# Patient Record
Sex: FEMALE | Race: WHITE | NOT HISPANIC OR LATINO | Employment: FULL TIME | URBAN - METROPOLITAN AREA
[De-identification: names, ages, dates, MRNs, and addresses within clinical notes are randomized per-mention and may not be internally consistent; named-entity substitution may affect disease eponyms.]

---

## 2018-03-06 LAB — PAP SMEAR: NORMAL

## 2018-12-10 ENCOUNTER — OFFICE VISIT (OUTPATIENT)
Dept: FAMILY MEDICINE | Facility: CLINIC | Age: 33
End: 2018-12-10
Payer: COMMERCIAL

## 2018-12-10 VITALS
DIASTOLIC BLOOD PRESSURE: 60 MMHG | SYSTOLIC BLOOD PRESSURE: 108 MMHG | TEMPERATURE: 99 F | WEIGHT: 173.38 LBS | HEIGHT: 64 IN | RESPIRATION RATE: 18 BRPM | HEART RATE: 64 BPM | BODY MASS INDEX: 29.6 KG/M2

## 2018-12-10 DIAGNOSIS — R49.0 HOARSENESS: ICD-10-CM

## 2018-12-10 DIAGNOSIS — F32.A DEPRESSION, UNSPECIFIED DEPRESSION TYPE: ICD-10-CM

## 2018-12-10 DIAGNOSIS — R06.83 SNORING: ICD-10-CM

## 2018-12-10 DIAGNOSIS — Z00.00 ANNUAL PHYSICAL EXAM: Primary | ICD-10-CM

## 2018-12-10 DIAGNOSIS — M94.0 COSTOCHONDRITIS: ICD-10-CM

## 2018-12-10 DIAGNOSIS — F41.9 ANXIETY: ICD-10-CM

## 2018-12-10 DIAGNOSIS — F41.0 PANIC ATTACK: ICD-10-CM

## 2018-12-10 LAB
CHOLEST SERPL-MCNC: 222 MG/DL
CHOLEST/HDLC SERPL: 3.4 {RATIO}
ESTIMATED AVG GLUCOSE: 97 MG/DL
HBA1C MFR BLD HPLC: 5 %
HDLC SERPL-MCNC: 66 MG/DL
HDLC SERPL: 29.7 %
LDLC SERPL CALC-MCNC: 133.4 MG/DL
NONHDLC SERPL-MCNC: 156 MG/DL
TRIGL SERPL-MCNC: 113 MG/DL
TSH SERPL DL<=0.005 MIU/L-ACNC: 2.04 UIU/ML

## 2018-12-10 PROCEDURE — 3008F BODY MASS INDEX DOCD: CPT | Mod: CPTII,S$GLB,, | Performed by: FAMILY MEDICINE

## 2018-12-10 PROCEDURE — 80061 LIPID PANEL: CPT

## 2018-12-10 PROCEDURE — 90472 IMMUNIZATION ADMIN EACH ADD: CPT | Mod: S$GLB,,, | Performed by: FAMILY MEDICINE

## 2018-12-10 PROCEDURE — 90686 IIV4 VACC NO PRSV 0.5 ML IM: CPT | Mod: S$GLB,,, | Performed by: FAMILY MEDICINE

## 2018-12-10 PROCEDURE — 99204 OFFICE O/P NEW MOD 45 MIN: CPT | Mod: 25,S$GLB,, | Performed by: FAMILY MEDICINE

## 2018-12-10 PROCEDURE — 83036 HEMOGLOBIN GLYCOSYLATED A1C: CPT

## 2018-12-10 PROCEDURE — 90715 TDAP VACCINE 7 YRS/> IM: CPT | Mod: S$GLB,,, | Performed by: FAMILY MEDICINE

## 2018-12-10 PROCEDURE — 90471 IMMUNIZATION ADMIN: CPT | Mod: S$GLB,,, | Performed by: FAMILY MEDICINE

## 2018-12-10 PROCEDURE — 84443 ASSAY THYROID STIM HORMONE: CPT

## 2018-12-10 PROCEDURE — 36415 COLL VENOUS BLD VENIPUNCTURE: CPT | Mod: S$GLB,,, | Performed by: FAMILY MEDICINE

## 2018-12-10 RX ORDER — DOXYCYCLINE HYCLATE 100 MG/1
100 TABLET, DELAYED RELEASE ORAL 2 TIMES DAILY
Qty: 20 TABLET | Refills: 0 | Status: SHIPPED | OUTPATIENT
Start: 2018-12-10 | End: 2019-01-22 | Stop reason: ALTCHOICE

## 2018-12-10 RX ORDER — ALPRAZOLAM 0.5 MG/1
0.5 TABLET ORAL DAILY PRN
Qty: 30 TABLET | Refills: 0 | Status: SHIPPED | OUTPATIENT
Start: 2018-12-10 | End: 2019-03-15 | Stop reason: SDUPTHER

## 2018-12-10 RX ORDER — PAROXETINE HYDROCHLORIDE 20 MG/1
20 TABLET, FILM COATED ORAL EVERY MORNING
Qty: 30 TABLET | Refills: 1 | Status: SHIPPED | OUTPATIENT
Start: 2018-12-10 | End: 2019-01-22

## 2018-12-10 RX ORDER — CETIRIZINE HYDROCHLORIDE 10 MG/1
10 TABLET ORAL DAILY
COMMUNITY

## 2018-12-10 RX ORDER — FLUTICASONE PROPIONATE 50 MCG
2 SPRAY, SUSPENSION (ML) NASAL DAILY
Qty: 16 G | Refills: 5 | Status: SHIPPED | OUTPATIENT
Start: 2018-12-10 | End: 2021-03-05

## 2018-12-10 NOTE — PROGRESS NOTES
Subjective:       Patient ID: Christel Alejo is a 33 y.o. female.    Chief Complaint: Establish Care and ER Follow up ([facility]: Chest pain under Lt arm, numbness; Told Swelling in cartliage or stress)    HPI   The patient is a 33-year-old who is here today to establish care and to follow-up from a recent ER visit.    Today we discussed the followin) chest pain. She was recently in the ER with left-sided chest pain. She was diagnosed with costochondritis.  She was given a prescription for Naprosyn but never filled it.  Her pain has improved significantly.  She currently just has a light pain that she rates a 1 on the pain scale.  Her pain is in her left upper chest.  Her pain feels like a tightness.  Her pain is worse with deep breathing.  When she went to the ER, she was having left arm numbness, left upper chest pain/tightness that felt like severe tightness and like a sharp bite sensation, shortness of breath feeling as if she could not breathe causing her to take shallow breaths all of which woke her up from a dead sleep.  In the ER, she had labs, an EKG and chest x-ray all of which were normal.  She really does not recall any injury or trauma that may have contributed to the symptoms although she had been working on [email or web address] getting boxes in and out of the attic  2) anxiety and depression.  She has had a lot of stressors recently which we discussed.  She is not myself.  She feels as if her bucket is full.  She is frequently crying.  She feels depressed.  She is not sleeping well.  She has gained 30 lb in the past year (partially because she have moved and is no longer going to the gym and partially because she is stress eating).  She feels anxious , worried and stressed.  She has had anxiousness for years and had learned to suppress those feelings but that has been much harder to do lately.  Previously, she was taking a rare Xanax for panic attacks but 1 prescription would last her a  year.  Given how things have been recently, she feels that she may need a daily medicine for her mood with p.r.n. Xanax   3)  Rash.  She has a rash that is present on her back and the back of her left leg.  This has been present for a while and she has a tendency to pick at it so some areas are worse than others  4) hoarseness.  She does get horse after talking for a long period of time.  Her paternal grandmother and father have problems with hoarseness and were told this was a genetic issue.  Her grandmother and father have to get shots in her vocal cords.  She wonders if she may have the same thing      Review of Systems   Constitutional: Positive for unexpected weight change. Negative for appetite change, chills, diaphoresis, fatigue and fever.   HENT: Positive for voice change. Negative for congestion, dental problem, ear pain, hearing loss, postnasal drip, rhinorrhea, sneezing, sore throat and trouble swallowing.    Eyes: Negative for photophobia, pain, discharge and visual disturbance.   Respiratory: Negative for cough, chest tightness, shortness of breath and wheezing.    Cardiovascular: Negative for chest pain, palpitations and leg swelling.   Gastrointestinal: Negative for abdominal distention, abdominal pain, blood in stool, constipation, diarrhea, nausea and vomiting.   Endocrine: Negative for cold intolerance, heat intolerance, polydipsia and polyuria.   Genitourinary: Negative for dysuria, flank pain, frequency, genital sores, hematuria, menstrual problem and vaginal discharge.   Musculoskeletal: Negative for arthralgias, joint swelling and myalgias.   Skin: Positive for rash.   Neurological: Negative for dizziness, syncope, light-headedness and headaches.   Hematological: Negative for adenopathy. Does not bruise/bleed easily.   Psychiatric/Behavioral: Positive for dysphoric mood and sleep disturbance. Negative for self-injury and suicidal ideas. The patient is nervous/anxious.        Objective:       Physical Exam   Constitutional: She is oriented to person, place, and time. She appears well-developed and well-nourished. No distress.   HENT:   Head: Normocephalic and atraumatic.   Right Ear: Hearing, tympanic membrane, external ear and ear canal normal.   Left Ear: Hearing, tympanic membrane, external ear and ear canal normal.   Nose: Nose normal.   Mouth/Throat: Oropharynx is clear and moist and mucous membranes are normal. No oral lesions. No oropharyngeal exudate, posterior oropharyngeal edema or posterior oropharyngeal erythema.   Eyes: Conjunctivae, EOM and lids are normal. Pupils are equal, round, and reactive to light. No scleral icterus.   Neck: Normal range of motion. Neck supple. Carotid bruit is not present. No thyroid mass and no thyromegaly present.   Cardiovascular: Normal rate, regular rhythm and normal heart sounds.  No extrasystoles are present. PMI is not displaced. Exam reveals no gallop.   No murmur heard.  Pulmonary/Chest: Effort normal and breath sounds normal. No accessory muscle usage. No respiratory distress.   Clear to auscultation bilaterally.   Abdominal: Soft. Normal appearance and bowel sounds are normal. She exhibits no abdominal bruit. There is no hepatosplenomegaly. There is no tenderness. There is no rebound.   Lymphadenopathy:        Head (right side): No submental and no submandibular adenopathy present.        Head (left side): No submental and no submandibular adenopathy present.        Right cervical: No superficial cervical, no deep cervical and no posterior cervical adenopathy present.       Left cervical: No superficial cervical, no deep cervical and no posterior cervical adenopathy present.        Right: No supraclavicular adenopathy present.        Left: No supraclavicular adenopathy present.   Neurological: She is alert and oriented to person, place, and time. She has normal strength. No cranial nerve deficit or sensory deficit.   Skin: Skin is warm, dry and intact.  "  Rash on back and posteriorly on left leg consistent of redness and occasional small pustules around follicles consistent with folliculitis.     Psychiatric: Her behavior is normal. Thought content normal. Her mood appears anxious. Her speech is rapid and/or pressured. Cognition and memory are normal. She exhibits a depressed mood.   Tearful.     Blood pressure 108/60, pulse 64, temperature 98.6 °F (37 °C), temperature source Oral, resp. rate 18, height 5' 4" (1.626 m), weight 78.7 kg (173 lb 6.4 oz).Body mass index is 29.76 kg/m².          A/P:  1) chest pain most consistent with costochondritis.  Improved.  If her symptoms do not resolve or if they worsen, she will let me know.  She does have Naprosyn prescription that she could fill or we could do a short round of prednisone  2) anxiety and depression.  New to me.  Uncontrolled.  We will start Paxil 20 mg once a day.  She was given a limited supply of Xanax for p.r.n. anxiety use.  She understands this is a benzodiazepine with the potential for addiction and tolerance.  The patient also understands that all benzos impair reflexes and cognition and so the patient should not drive, operate heavy machinery or make significant decisions while taking the benzodiazepine.  The patient should also not take the benzodiazepines with alcohol.  We will check labs including a TSH given her weight gain  3) folliculitis.  New to me.  We will treat her with a course of doxycycline.  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  We will refer her to ENT for further evaluation  5) health maintenance issues.  We will check fasting labs.  We will administer Tdap and flu vaccine    I will see her back in 6-8 weeks or sooner if needed          "

## 2018-12-13 ENCOUNTER — PATIENT MESSAGE (OUTPATIENT)
Dept: FAMILY MEDICINE | Facility: CLINIC | Age: 33
End: 2018-12-13

## 2019-01-09 ENCOUNTER — TELEPHONE (OUTPATIENT)
Dept: ADMINISTRATIVE | Facility: HOSPITAL | Age: 34
End: 2019-01-09

## 2019-01-22 ENCOUNTER — OFFICE VISIT (OUTPATIENT)
Dept: FAMILY MEDICINE | Facility: CLINIC | Age: 34
End: 2019-01-22
Payer: COMMERCIAL

## 2019-01-22 VITALS
HEIGHT: 64 IN | WEIGHT: 175.38 LBS | SYSTOLIC BLOOD PRESSURE: 110 MMHG | TEMPERATURE: 98 F | RESPIRATION RATE: 18 BRPM | DIASTOLIC BLOOD PRESSURE: 70 MMHG | HEART RATE: 80 BPM | BODY MASS INDEX: 29.94 KG/M2

## 2019-01-22 DIAGNOSIS — G47.00 INSOMNIA, UNSPECIFIED TYPE: ICD-10-CM

## 2019-01-22 DIAGNOSIS — R21 RASH: ICD-10-CM

## 2019-01-22 DIAGNOSIS — R53.83 FATIGUE, UNSPECIFIED TYPE: ICD-10-CM

## 2019-01-22 DIAGNOSIS — F41.9 ANXIETY: Primary | ICD-10-CM

## 2019-01-22 PROCEDURE — 99213 OFFICE O/P EST LOW 20 MIN: CPT | Mod: S$GLB,,, | Performed by: FAMILY MEDICINE

## 2019-01-22 PROCEDURE — 3008F BODY MASS INDEX DOCD: CPT | Mod: CPTII,S$GLB,, | Performed by: FAMILY MEDICINE

## 2019-01-22 PROCEDURE — 99213 PR OFFICE/OUTPT VISIT, EST, LEVL III, 20-29 MIN: ICD-10-PCS | Mod: S$GLB,,, | Performed by: FAMILY MEDICINE

## 2019-01-22 PROCEDURE — 3008F PR BODY MASS INDEX (BMI) DOCUMENTED: ICD-10-PCS | Mod: CPTII,S$GLB,, | Performed by: FAMILY MEDICINE

## 2019-01-22 RX ORDER — SERTRALINE HYDROCHLORIDE 25 MG/1
25 TABLET, FILM COATED ORAL DAILY
Qty: 30 TABLET | Refills: 1 | Status: SHIPPED | OUTPATIENT
Start: 2019-01-22 | End: 2019-03-15 | Stop reason: SDUPTHER

## 2019-01-22 RX ORDER — TRAZODONE HYDROCHLORIDE 50 MG/1
25 TABLET ORAL NIGHTLY
Qty: 15 TABLET | Refills: 1 | Status: SHIPPED | OUTPATIENT
Start: 2019-01-22 | End: 2019-03-15 | Stop reason: SDUPTHER

## 2019-01-22 NOTE — PROGRESS NOTES
Subjective:       Patient ID: Christel Alejo is a 33 y.o. female.    Chief Complaint: Anxiety (6 week medication follow up )    HPI   The patient is a 33-year-old who is here today to follow-up on her anxiety.  She was not able to tolerate the Paxil.  She tried to take the Paxil for 2 weeks before she stopped it.  With the Paxil, she felt tired, fatigued, unmotivated, unfocused, and very nauseated.  When she stop the Paxil, these symptoms resolved.  She is still having anxiety and wonders if there is anything else that she can take.  Since I have seen her last, she has used about 5 Xanax tablets    She is also having trouble sleeping at night.  She falls asleep easily at night but is up and down the rest of the night thinking about things.    Of note, she still has a persistent rash    Review of Systems   Constitutional: Positive for fatigue. Negative for appetite change, chills, diaphoresis, fever and unexpected weight change.   HENT: Positive for voice change. Negative for congestion, ear pain, postnasal drip, rhinorrhea, sinus pressure, sneezing, sore throat and trouble swallowing.    Eyes: Negative for pain, discharge and visual disturbance.   Respiratory: Negative for cough, chest tightness, shortness of breath and wheezing.    Cardiovascular: Negative for chest pain, palpitations and leg swelling.   Gastrointestinal: Negative for abdominal distention, abdominal pain, blood in stool, constipation, diarrhea, nausea and vomiting.   Skin: Positive for rash.   Psychiatric/Behavioral: Positive for sleep disturbance. Negative for dysphoric mood. The patient is nervous/anxious.        Objective:      Physical Exam   Constitutional: She appears well-developed and well-nourished.   Psychiatric: She has a normal mood and affect. Her speech is normal and behavior is normal. Judgment and thought content normal. Cognition and memory are normal.     Blood pressure 110/70, pulse 80, temperature 98.2 °F (36.8 °C),  "temperature source Oral, resp. rate 18, height 5' 4" (1.626 m), weight 79.6 kg (175 lb 6.4 oz).Body mass index is 30.11 kg/m².            A/P:  1) anxiety.  Persistent.  We are going to try Zoloft.  Given her sensitivity to medicine, we are going to start was Zoloft 25 mg once a day for 1 or 2 weeks and then increase to 50 mg thereafter.  If she develops any new or worsening symptoms, she will let me know.  She will continue use her Xanax sparingly.  I will see her back in 6 weeks or sooner if needed    2) insomnia.  Persistent.  We are going to try trazodone as 25- 50 mg at night  3) rash.  Persistent.  We will refer her to Dermatology        Total visit time was 20 min  Greater than 50% of this time was spent counseling the patient or coordinating their care for her anxiety  "

## 2019-02-15 ENCOUNTER — OFFICE VISIT (OUTPATIENT)
Dept: OTOLARYNGOLOGY | Facility: CLINIC | Age: 34
End: 2019-02-15
Payer: COMMERCIAL

## 2019-02-15 ENCOUNTER — HOSPITAL ENCOUNTER (OUTPATIENT)
Dept: RADIOLOGY | Facility: HOSPITAL | Age: 34
Discharge: HOME | End: 2019-02-15
Attending: NURSE PRACTITIONER
Payer: COMMERCIAL

## 2019-02-15 ENCOUNTER — INITIAL CONSULT (OUTPATIENT)
Dept: DERMATOLOGY | Facility: CLINIC | Age: 34
End: 2019-02-15
Payer: COMMERCIAL

## 2019-02-15 ENCOUNTER — TELEPHONE (OUTPATIENT)
Dept: OTOLARYNGOLOGY | Facility: CLINIC | Age: 34
End: 2019-02-15

## 2019-02-15 VITALS
SYSTOLIC BLOOD PRESSURE: 110 MMHG | BODY MASS INDEX: 30.41 KG/M2 | DIASTOLIC BLOOD PRESSURE: 76 MMHG | WEIGHT: 178.13 LBS | HEIGHT: 64 IN

## 2019-02-15 DIAGNOSIS — Z12.83 SCREENING EXAM FOR SKIN CANCER: ICD-10-CM

## 2019-02-15 DIAGNOSIS — L98.9 DISEASE OF SKIN AND SUBCUTANEOUS TISSUE: ICD-10-CM

## 2019-02-15 DIAGNOSIS — R07.0 THROAT DISCOMFORT: ICD-10-CM

## 2019-02-15 DIAGNOSIS — D22.9 MULTIPLE BENIGN NEVI: ICD-10-CM

## 2019-02-15 DIAGNOSIS — R05.3 CHRONIC COUGH: ICD-10-CM

## 2019-02-15 DIAGNOSIS — R09.89 CHRONIC THROAT CLEARING: ICD-10-CM

## 2019-02-15 DIAGNOSIS — J32.0 LEFT MAXILLARY SINUSITIS: ICD-10-CM

## 2019-02-15 DIAGNOSIS — L70.0 ACNE VULGARIS: Primary | ICD-10-CM

## 2019-02-15 DIAGNOSIS — L73.9 FOLLICULITIS: ICD-10-CM

## 2019-02-15 DIAGNOSIS — R49.0 DYSPHONIA: ICD-10-CM

## 2019-02-15 DIAGNOSIS — J32.0 LEFT MAXILLARY SINUSITIS: Primary | ICD-10-CM

## 2019-02-15 DIAGNOSIS — K21.9 LPRD (LARYNGOPHARYNGEAL REFLUX DISEASE): ICD-10-CM

## 2019-02-15 PROCEDURE — 99999 PR PBB SHADOW E&M-EST. PATIENT-LVL IV: CPT | Mod: PBBFAC,,, | Performed by: NURSE PRACTITIONER

## 2019-02-15 PROCEDURE — 99999 PR PBB SHADOW E&M-EST. PATIENT-LVL III: ICD-10-PCS | Mod: PBBFAC,,, | Performed by: DERMATOLOGY

## 2019-02-15 PROCEDURE — 70220 X-RAY EXAM OF SINUSES: CPT | Mod: 26,,, | Performed by: RADIOLOGY

## 2019-02-15 PROCEDURE — 31575 DIAGNOSTIC LARYNGOSCOPY: CPT | Mod: S$GLB,,, | Performed by: NURSE PRACTITIONER

## 2019-02-15 PROCEDURE — 99999 PR PBB SHADOW E&M-EST. PATIENT-LVL III: CPT | Mod: PBBFAC,,, | Performed by: DERMATOLOGY

## 2019-02-15 PROCEDURE — 31575 PR LARYNGOSCOPY, FLEXIBLE; DIAGNOSTIC: ICD-10-PCS | Mod: S$GLB,,, | Performed by: NURSE PRACTITIONER

## 2019-02-15 PROCEDURE — 99203 PR OFFICE/OUTPT VISIT, NEW, LEVL III, 30-44 MIN: ICD-10-PCS | Mod: S$GLB,,, | Performed by: DERMATOLOGY

## 2019-02-15 PROCEDURE — 99203 OFFICE O/P NEW LOW 30 MIN: CPT | Mod: S$GLB,,, | Performed by: DERMATOLOGY

## 2019-02-15 PROCEDURE — 99243 OFF/OP CNSLTJ NEW/EST LOW 30: CPT | Mod: 25,S$GLB,, | Performed by: NURSE PRACTITIONER

## 2019-02-15 PROCEDURE — 70220 X-RAY EXAM OF SINUSES: CPT | Mod: TC,FY,PO

## 2019-02-15 PROCEDURE — 70220 XR SINUSES MIN 3 VIEWS: ICD-10-PCS | Mod: 26,,, | Performed by: RADIOLOGY

## 2019-02-15 PROCEDURE — 99243 PR OFFICE CONSULTATION,LEVEL III: ICD-10-PCS | Mod: 25,S$GLB,, | Performed by: NURSE PRACTITIONER

## 2019-02-15 PROCEDURE — 99999 PR PBB SHADOW E&M-EST. PATIENT-LVL IV: ICD-10-PCS | Mod: PBBFAC,,, | Performed by: NURSE PRACTITIONER

## 2019-02-15 RX ORDER — OMEPRAZOLE 40 MG/1
40 CAPSULE, DELAYED RELEASE ORAL
Qty: 30 CAPSULE | Refills: 11 | Status: SHIPPED | OUTPATIENT
Start: 2019-02-15 | End: 2021-03-05

## 2019-02-15 RX ORDER — CLINDAMYCIN PHOSPHATE AND BENZOYL PEROXIDE 10; 50 MG/G; MG/G
GEL TOPICAL
Qty: 45 G | Refills: 3 | Status: SHIPPED | OUTPATIENT
Start: 2019-02-15 | End: 2021-03-05

## 2019-02-15 RX ORDER — TRETINOIN 0.25 MG/G
CREAM TOPICAL NIGHTLY
Qty: 20 G | Refills: 3 | Status: SHIPPED | OUTPATIENT
Start: 2019-02-15 | End: 2021-03-05

## 2019-02-15 NOTE — LETTER
February 15, 2019      Bessy Guerrero MD  [address] [address] [address]           [facility]  [address] [facility] [address] [place] [address]  Phone: [phone]  Fax: [phone]          Patient: Christel Alejo   MR Number: [id number]   YOB: 1985   Date of Visit: 2/15/2019       Dear Dr. Bessy Guerrero:    Thank you for referring Christel Alejo to me for evaluation. Attached you will find relevant portions of my assessment and plan of care.    If you have questions, please do not hesitate to call me. I look forward to following Christel Alejo along with you.    Sincerely,    Elena Tapia MD    Enclosure  CC:  No Recipients    If you would like to receive this communication electronically, please contact [email or web address] or [phone] to request more information on [employer] access.    For providers and/or their staff who would like to refer a patient to [facility], please contact us through our one-stop-shop provider referral line, [facility], at [phone].    If you feel you have received this communication in error or would no longer like to receive these types of communications, please e-mail [email or web address]

## 2019-02-15 NOTE — TELEPHONE ENCOUNTER
----- Message from Bailey Benítez NP sent at 2/15/2019 12:44 PM CST -----  Sinuses are all open and clear. Please return to your dentist for left maxillary tooth pain as it is not sinus related.

## 2019-02-15 NOTE — PROGRESS NOTES
Subjective:       Patient ID:  Christel Alejo is a 33 y.o. female who presents for   Chief Complaint   Patient presents with    Rash     Christel Alejo a 32 yo female presents today for initial skin check with concerns of recurrent rash on face and chest for a duration of 6month with redness and inflammation. Denies pruritis. Worse with sun exposure- described as bumps and hives. States rash is very transient. Worse with stress. Denies occurring in hot showers. Takes zyrtec 10 mg daily.     Denies hx of skin cancer  Denies fx hx of melanoma    Past Medical History:  No date: Allergy  No date: Anxiety  No date: Depression  No date: History of cervical dysplasia      Comment:  s/p conization followers with Dr Vargas          Review of Systems   Skin: Positive for rash, dry skin and activity-related sunscreen use. Negative for itching.        Objective:    Physical Exam   Constitutional: She appears well-developed and well-nourished. No distress.   Neurological: She is alert and oriented to person, place, and time. She is not disoriented.   Psychiatric: She has a normal mood and affect.   Skin:   Areas Examined (abnormalities noted in diagram):   Scalp / Hair Palpated and Inspected  Head / Face Inspection Performed  Neck Inspection Performed  Chest / Axilla Inspection Performed  Abdomen Inspection Performed  Genitals / Buttocks / Groin Inspection Performed  Back Inspection Performed  RUE Inspected  LUE Inspection Performed  RLE Inspected  LLE Inspection Performed  Nails and Digits Inspection Performed                               Diagram Legend     Erythematous scaling macule/papule c/w actinic keratosis       Vascular papule c/w angioma      Pigmented verrucoid papule/plaque c/w seborrheic keratosis      Yellow umbilicated papule c/w sebaceous hyperplasia      Irregularly shaped tan macule c/w lentigo     1-2 mm smooth white papules consistent with Milia      Movable subcutaneous cyst with punctum c/w  epidermal inclusion cyst      Subcutaneous movable cyst c/w pilar cyst      Firm pink to brown papule c/w dermatofibroma      Pedunculated fleshy papule(s) c/w skin tag(s)      Evenly pigmented macule c/w junctional nevus     Mildly variegated pigmented, slightly irregular-bordered macule c/w mildly atypical nevus      Flesh colored to evenly pigmented papule c/w intradermal nevus       Pink pearly papule/plaque c/w basal cell carcinoma      Erythematous hyperkeratotic cursted plaque c/w SCC      Surgical scar with no sign of skin cancer recurrence      Open and closed comedones      Inflammatory papules and pustules      Verrucoid papule consistent consistent with wart     Erythematous eczematous patches and plaques     Dystrophic onycholytic nail with subungual debris c/w onychomycosis     Umbilicated papule    Erythematous-base heme-crusted tan verrucoid plaque consistent with inflamed seborrheic keratosis     Erythematous Silvery Scaling Plaque c/w Psoriasis     See annotation      Assessment / Plan:        Acne vulgaris  -     tretinoin (RETIN-A) 0.025 % cream; Apply topically every evening.  Dispense: 20 g; Refill: 3  -     clindamycin-benzoyl peroxide gel; AAA face qam to bid  Dispense: 45 g; Refill: 3    - Initiate topical tretinoin 0.025% cream, to be applied to face each night as tolerated. Side effects of redness, dryness, and irritation reviewed.  - Initiate topical clindamycin-BP gel to legs and face  - Use gentle cleanser    Folliculitis  -     clindamycin-benzoyl peroxide gel; AAA face qam to bid  Dispense: 45 g; Refill: 3    Clindamycin-BP BID to legs  Provided reassurance that this is a benign condition that may wax and wane and can be aggravated by friction and heat.     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 No lesions suspicious for malignancy noted.  Reassurance provided.  Instructed patient to observe lesion(s) for changes and follow up in clinic if changes are noted. Discussed ABCDE's of moles and brochure provided.    Screening exam for skin cancer  Total body skin examination performed today including at least 12 points as noted in physical examination. No lesions suspicious for malignancy noted.             No Follow-up on file.

## 2019-02-15 NOTE — PROGRESS NOTES
Subjective:       Patient ID: Christel Alejo is a 33 y.o. female.    Chief Complaint: Other (hoarness and snoring constant clearing of throat per pt)    HPI   Patient is new to ENT, referred by Dr. Guerrero for consultation for hoarseness. Patient reports her chief concern is constant throat clearing, which she attributes to nasal allergies. 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 She also has a chronic dry cough that worsens when she lays down at night.     IMAGING:  Chest x-ray two months ago negative. No sinus imaging.   EGD/GI notes: none  ALLERGY notes: none  ANTIBIOTICS: doxycycline 2 months ago    Review of Systems   Constitutional: Negative.    HENT: Positive for dental problem, postnasal drip, sneezing and voice change. Negative for congestion, facial swelling, rhinorrhea, sore throat and trouble swallowing.         Frequent throat clearing  Chronic sensation of thick or too much mucus in the back of her throat   Eyes: Negative.    Respiratory: Positive for cough (dry). Negative for choking.    Cardiovascular: Negative.    Gastrointestinal: Negative.    Musculoskeletal: Negative.    Skin: Negative.    Neurological: Negative.    Hematological: Negative.    Psychiatric/Behavioral: Negative.        Objective:      Physical Exam   Constitutional: She is oriented to person, place, and time. Vital signs are normal. She appears well-developed and well-nourished. She is cooperative. She does not appear ill. No distress.   HENT:   Head: Normocephalic and atraumatic.   Right Ear: Hearing, tympanic membrane, external ear and ear canal normal. Tympanic membrane is not erythematous. No middle ear effusion.   Left Ear: Hearing, tympanic membrane, external ear and ear canal normal. Tympanic membrane is not erythematous.  No middle ear effusion.   Nose: Nose normal. No mucosal edema or rhinorrhea. Right sinus exhibits no maxillary sinus tenderness and no  frontal sinus tenderness. Left sinus exhibits no maxillary sinus tenderness and no frontal sinus tenderness.   Mouth/Throat: Uvula is midline, oropharynx is clear and moist and mucous membranes are normal. Mucous membranes are not pale, not dry and not cyanotic. No oral lesions. No oropharyngeal exudate, posterior oropharyngeal edema or posterior oropharyngeal erythema.   Eyes: Conjunctivae, EOM and lids are normal. Pupils are equal, round, and reactive to light. Right eye exhibits no discharge. Left eye exhibits no discharge. No scleral icterus.   Neck: Trachea normal and normal range of motion. Neck supple. No tracheal deviation present. No thyroid mass and no thyromegaly present.   Cardiovascular: Normal rate.   Pulmonary/Chest: Effort normal. No stridor. No respiratory distress. She has no wheezes.   Musculoskeletal: Normal range of motion.   Lymphadenopathy:        Head (right side): No submental, no submandibular, no tonsillar, no preauricular and no posterior auricular adenopathy present.        Head (left side): No submental, no submandibular, no tonsillar, no preauricular and no posterior auricular adenopathy present.     She has no cervical adenopathy.        Right cervical: No superficial cervical and no posterior cervical adenopathy present.       Left cervical: No superficial cervical and no posterior cervical adenopathy present.   Neurological: She is alert and oriented to person, place, and time. She has normal strength. Coordination and gait normal.   Skin: Skin is warm, dry and intact. No lesion and no rash noted. She is not diaphoretic. No cyanosis. No pallor.   Psychiatric: She has a normal mood and affect. Her speech is normal and behavior is normal. Judgment and thought content normal. Cognition and memory are normal.   Nursing note and vitals reviewed.      Procedure: Flexible laryngoscopy    In order to fully examine the upper aerodigestive tract, including the larynx, in a patient with a  hyperactive gag reflex, and suboptimal visualization with indirect mirror exam,  flexible endoscopy is required.   After explaining the procedure and obtaining verbal consent, a timeout was performed with the patient's participation according to the universal protocol. Both nasal cavities were anesthetized with 4% Xylocaine spray mixed with Alexis-Synephrine. The flexible laryngoscope  was inserted into the nasal cavity and advanced to visualize the nasal cavity, nasopharynx, the posterior oropharynx, hypopharynx, and the endolarynx with the  findings noted. The scope was removed and the procedure terminated. The patient tolerated this procedure well without apparent complication.     OVERALL FINDINGS  Nasopharynx - the torus is clear. There are no lesions of the posterior wall.   Oropharynx - no lesions of the tongue base. There is no obvious fullness or asymmetry.  Hypopharynx - there are no lesions of the pyriform sinuses or postcricoid region   Larynx - there are no lesions of the supraglottic or glottic larynx.  Vocal fold mobility is normal.     SPECIFIC FINDINGS  Adenoid tissue - normal   Nasopharynx & eustachian tube orifices - normal   Posterior pharyngeal wall - normal   Base of tongue - normal   Epiglottis - normal   Valleculae - normal   Pyriform sinuses - normal   False vocal cords - normal   True vocal cords - normal  Arytenoids - normal   Interarytenoid space - erythema, edema   Larynx    Larynx    Assessment:     LPRD, manifested as chronic throat clearing, chronic dry cough, recurrent dysphonia, recurrent throat irritation      Plan:     Sinus imaging today to rule out sinus contribution    Advised/Cautioned: The results of today's ENT exam and flexible endoscopy were detailed to the patient and her questions were answered. Patient education centered around GERD, known exacerbants and contemporary treatment options. Laryngoscope photos were given to the patient. Handouts given on LPRD and GERD were  given to the patient. After review of these, patient elected to be placed on PPI 40 mg QAM on an empty stomach for the next 6-8 weeks, and H2-blocker QHS. I encouraged the patient once she has completed the evening meal to not snack or consume any other food products or caffeinated beverages for at least  minutes before retiring. Finally, I encouraged the patient to sleep about 30 degrees above horizontal, and this can be facilitated by using 2-3 pillows or a wedge foam product. If the patient is not demonstrably improved in 6-8 weeks, consultation with gastroenterology may be indicated to rule out intrinsic disease in the lower esophagus, stomach, or proximal duodenum.

## 2019-02-15 NOTE — PATIENT INSTRUCTIONS
Top Concerns for Chronic Cough:     1. Nasal allergies -- Typical constellation of symptoms seen with nasal allergies: itchy, red, watery eyes; itchy, red, watery nose; excessive sneezing; excessive stuffiness. If this one best describes your current state, then discuss with your primary care provider whether you should see an allergist or take daily allergy medications.     2. Sinus Infection -- Typical constellation of symptoms seen with acute bacterial sinus infection are:  Green-gold, foul-smelling, foul-tasting mucus from nose and throat, inability to breathe through nose, inability to smell or taste well, facial pain and swelling, dental pain, headaches around eyes, sore throat and productive cough. If this one best describes your current state, then let's get sinus imaging to rule out infection.     3.  Silent reflux -- Typical constellation of symptoms seen with silent reflux: post-nasal drip sensation with absence of significant runny nose or nasal congestion, sensation of thick or too much mucus in the back of throat, raspy voice, frequent throat clearing, lump in the back of throat, frequent sore throats. If this one best describes your current state, discuss with your primary care provider whether you should see a gastroenterologist or take daily reflux medications. Your GI doctor may want to do an Upper GI or obtain a barium swallow or pH monitoring test.     4. Asthma/Pulmonary (Lung) issue -- Typical constellation of symptoms: wheezing, shortness of breath. Discuss with your primary care provider whether you should see a pulmonologist (lung specialist) and have Pulmonary Function Testing (PFTs) done.       How Acid Reflux Affects Your Throat    Do you have to clear your throat or cough often? Are you hoarse? Do you have trouble swallowing? If you have these or other throat symptoms, you may have acid reflux. This occurs when stomach acid flows back up and irritates your throat.  Why you have throat  "symptoms  There are muscles (esophageal sphincters) at both ends of the tube that carries food to your stomach (the esophagus). These muscles relax to let food pass. Then they tighten to keep stomach acid down. When the lower esophageal sphincter (LES) doesnt tighten enough, acid can flow back (reflux) from your stomach into your esophagus. This may cause heartburn. In some cases the upper esophageal sphincter (UES) also doesnt work well. Then acid can travel higher and enter your throat (pharynx). In many cases, this causes throat symptoms.  Common throat symptoms  · Need to clear your throat often  · Feeling like youre choking  · Long-term (chronic) cough  · Hoarseness  · Trouble swallowing  · Feel like you have a lump in your throat  · Sour or acid taste  · Sore throat that keeps coming back     LARYNGOPHARYNGEAL REFLUX  (SILENT OR ATYPICAL REFLUX)    If you have any of the following symptoms you may have laryngopharyngeal reflux (LPR):  hoarseness, thick or too much mucus, chronic throat pain/irritation, chronic throat clearing, chronic cough, especially cough that wake you up from sleep, chronic "postnasal drip" without the need to blow your nose.     Many people with LPR do not have symptoms of heartburn. Compared to the esophagus, the voice box and the back of the throat are significantly more sensitive to the effects of acid on surrounding tissue. Acid passing quickly through the esophagus does not have a chance to irritate the area for too long.  However acid that pools in the throat or voice box can cause prolonged irritation resulting in the symptoms of LPR. In patients known to have LPR, 71% reported hoarseness, 51% reported chronic cough, 47% reported sensation of thickness or lump in the back of the throat, 42% reported chronic throat clearing, and 35% reported trouble swallowing.     Another major symptom of LPR is "postnasal drip."  Patients are often told symptoms are due to abnormal nasal " "drainage or sinus infection; however this is rarely the cause of chronic throat irritation. For post nasal drip to cause the complaints described, signs and symptoms of an active nasal infection should be present.     Treatments for LPR include:  postural changes, weight reduction, diet modification, medication to reduce stomach acid and promote normal motility, and surgery to prevent reflux. Most patients will begin to notice some relief in her symptoms about 2 weeks after starting the medication; however it is generally recommended the medication should be continued for 2 months. If the symptoms completely resolve, the medication can then be tapered.  Some people will remain symptom free while others may have relapses which required treatment again.    Things you can do to prevent reflux include:  Do not smoke.  Smoking will cause reflux.  Avoid tight fitting clothes or belts around the waist.  Avoid vigorous exercise at least 2 hours before bedtime. Avoid eating at least 2 hours prior to bedtime.  In fact avoid eating your largest meal at night.  Weight loss.  For patient's with recent weight gain, shedding a few pounds is all that is required to improve reflux.  Avoid caffeine, cola beverages, citrus beverages, mints, alcoholic beverages, particularly at night, cheese, fried foods, spicy foods, eggs, and chocolate.  Sleep with the head of bed elevated at least 6 inches ("MedCline" wedge pillow).    Recommendations:    Take Nexium or Prilosec (PPI) every morning on an empty stomach (30-60 minutes before eating) 40 MG.   At bedtime take Zantac (H2-blocker) 300 mg.    After 4-8 weeks, with significant symptomatic improvement, you may begin weaning your reflux medications down:  Nexium or Prilosec 40 mg --> to 20 mg (over-the-counter strength).  Zantac 300 mg --> to 150 mg (over-the-counter stength).  Then continue to wean as symptoms allow.    See a Gastro doctor (GI) for refractory symptoms and continued " management.

## 2019-02-15 NOTE — PATIENT INSTRUCTIONS
Acne  Acne is an extremely common and very frustrating condition.  As everyones skin is different, successful management often requires trying different treatments to come up with an optimized regimen that works best for you.      Some general tips:  1. Products.  When applying anything to areas prone to breakouts (face, back, chest), look for products that are oil-free and/or non-comedogenic (not acne causing).    2. Avoid picking, squeezing, scrubbing or scratching acne lesions.  This can lead to longer healing times, skin infections, discoloration and scarring.  3. Patience.  It can sometimes take 2-3 months before you can tell if a treatment is working for your acne.  It is best to give your regimen at least this long, using treatments as directed by your doctor, before trying something else.      Your Regimen      Morning  Night    Wash: benzoyl peroxide Wash: Mild Soap      Apply:  Clindamycin Apply:  Tretinoin AND Clindamycin      Apply clindamycin and BP wash to legs and back      ---------------------------------------------------------------------------------------------------------------------------------------------------  Purchase Benzoyl Peroxide 5% or 10% wash (such has [employer] Clean and Clear cleanser). Neutrogena Clear Pore Mask/Cleanser is a 3.5% wash.    Use this once daily.    Use with white or old towels as this medication can have a bleaching effect on colored fabrics.    ---------------------------------------------------------------------------------------------------------------------------------------------------    Topical retinoid (tretinoin [Retin-A and others] or adapalene [Differin])  instructions    1. After washing with a mild cleanser, wait several minutes before applying topical retinoid.    2. Retinoid should be used every other night for the first one to two weeks and then each night thereafter if tolerated.  If the skin becomes very irritated using the retinoid  every night, cut back to every other night.    3. Do not apply retinoid near the eyelid margins.    4. Wash hands thoroughly after applying retinoid.    5. Retinoid may cause mild dryness, pinkness and peeling.  If the skin is getting red or sore, stop retinoid for a few days and then use it less frequently.    6. Use moisturizer with sunscreen as often as needed for dryness.    7. Use a mild soap for washing your face.    8. Retinoid can make the skin more sensitive to sun burning.  Therefore, it is important to use sunscreen.      9. Discontinue use of all acne medications if intending to become pregnant or if you become pregnant.

## 2019-02-15 NOTE — LETTER
February 15, 2019      Bessy Guerrero MD  [id number] [address] [address]           [facility]  [address] [facility] [address] [place] [address]  Phone: [phone]  Fax: [phone]          Patient: Christel Alejo   MR Number: [id number]   YOB: 1985   Date of Visit: 2/15/2019       Dear Dr. Bessy Guerrero:    Thank you for referring Christel Alejo to me for evaluation. Attached you will find relevant portions of my assessment and plan of care.    If you have questions, please do not hesitate to call me. I look forward to following Christel Alejo along with you.    Sincerely,    Bailey Benítez, TERESSA    Enclosure  CC:  No Recipients    If you would like to receive this communication electronically, please contact [email or web address] or [phone] to request more information on [employer] access.    For providers and/or their staff who would like to refer a patient to [facility], please contact us through our one-stop-shop provider referral line, [facility] , at [phone].    If you feel you have received this communication in error or would no longer like to receive these types of communications, please e-mail [email or web address]

## 2019-03-15 ENCOUNTER — OFFICE VISIT (OUTPATIENT)
Dept: FAMILY MEDICINE | Facility: CLINIC | Age: 34
End: 2019-03-15
Payer: COMMERCIAL

## 2019-03-15 VITALS
BODY MASS INDEX: 30.8 KG/M2 | WEIGHT: 180.38 LBS | HEIGHT: 64 IN | TEMPERATURE: 99 F | HEART RATE: 72 BPM | SYSTOLIC BLOOD PRESSURE: 100 MMHG | RESPIRATION RATE: 18 BRPM | OXYGEN SATURATION: 96 % | DIASTOLIC BLOOD PRESSURE: 60 MMHG

## 2019-03-15 DIAGNOSIS — F41.9 ANXIETY: Primary | ICD-10-CM

## 2019-03-15 PROCEDURE — 99213 PR OFFICE/OUTPT VISIT, EST, LEVL III, 20-29 MIN: ICD-10-PCS | Mod: S$GLB,,, | Performed by: FAMILY MEDICINE

## 2019-03-15 PROCEDURE — 99213 OFFICE O/P EST LOW 20 MIN: CPT | Mod: S$GLB,,, | Performed by: FAMILY MEDICINE

## 2019-03-15 PROCEDURE — 3008F PR BODY MASS INDEX (BMI) DOCUMENTED: ICD-10-PCS | Mod: CPTII,S$GLB,, | Performed by: FAMILY MEDICINE

## 2019-03-15 PROCEDURE — 3008F BODY MASS INDEX DOCD: CPT | Mod: CPTII,S$GLB,, | Performed by: FAMILY MEDICINE

## 2019-03-15 RX ORDER — SERTRALINE HYDROCHLORIDE 25 MG/1
25 TABLET, FILM COATED ORAL DAILY
Qty: 90 TABLET | Refills: 3 | Status: SHIPPED | OUTPATIENT
Start: 2019-03-15 | End: 2020-03-06

## 2019-03-15 RX ORDER — ALPRAZOLAM 0.5 MG/1
0.5 TABLET ORAL DAILY PRN
Qty: 30 TABLET | Refills: 0 | Status: SHIPPED | OUTPATIENT
Start: 2019-03-15 | End: 2020-07-14 | Stop reason: SDUPTHER

## 2019-03-15 RX ORDER — TRAZODONE HYDROCHLORIDE 50 MG/1
25 TABLET ORAL NIGHTLY
Qty: 45 TABLET | Refills: 3 | Status: SHIPPED | OUTPATIENT
Start: 2019-03-15 | End: 2020-03-26

## 2019-03-16 NOTE — PROGRESS NOTES
Subjective:       Patient ID: Christel Alejo is a 33 y.o. female.    Chief Complaint: Anxiety (6 week follow up )    HPI   The patient is a 33-year-old who is here today to follow-up on her anxiety.  She is doing much better.  She believes that the Zoloft has helped a lot.  Things do not bother her like the used to.  She feels that she can deal with the stress better.  She feels different with the Zoloft and her family and coworkers can tell the difference in her as well.  She feels happy with the Zoloft.  She feels that right now the Zoloft is a good dose for her.  She denies any side effects with the Zoloft.      She is sleeping well with the trazodone.  The trazodone knocks her out and she feels rested in morning when she wakes up    She does request refill the Xanax to have on hand in case she needs this.      Review of Systems   Constitutional: Negative for appetite change, chills, diaphoresis, fatigue, fever and unexpected weight change.   HENT: Negative for congestion, ear pain, postnasal drip, rhinorrhea, sinus pressure, sneezing, sore throat and trouble swallowing.    Eyes: Negative for pain, discharge and visual disturbance.   Respiratory: Negative for cough, chest tightness, shortness of breath and wheezing.    Cardiovascular: Negative for chest pain, palpitations and leg swelling.   Gastrointestinal: Negative for abdominal distention, abdominal pain, blood in stool, constipation, diarrhea, nausea and vomiting.   Skin: Negative for rash.   Psychiatric/Behavioral: Negative for dysphoric mood, self-injury, sleep disturbance and suicidal ideas. The patient is not nervous/anxious.        Objective:      Physical Exam   Constitutional: She is oriented to person, place, and time. She appears well-developed and well-nourished. No distress.   HENT:   Head: Normocephalic and atraumatic.   Right Ear: Hearing, tympanic membrane, external ear and ear canal normal.   Left Ear: Hearing, tympanic membrane,  "external ear and ear canal normal.   Nose: Nose normal.   Mouth/Throat: Oropharynx is clear and moist and mucous membranes are normal. No oral lesions. No oropharyngeal exudate, posterior oropharyngeal edema or posterior oropharyngeal erythema.   Eyes: Conjunctivae, EOM and lids are normal. Pupils are equal, round, and reactive to light. No scleral icterus.   Neck: Normal range of motion. Neck supple. Carotid bruit is not present. No thyroid mass and no thyromegaly present.   Cardiovascular: Normal rate, regular rhythm and normal heart sounds.  No extrasystoles are present. PMI is not displaced. Exam reveals no gallop.   No murmur heard.  Pulmonary/Chest: Effort normal and breath sounds normal. No accessory muscle usage. No respiratory distress.   Clear to auscultation bilaterally.   Abdominal: Soft. Normal appearance and bowel sounds are normal. She exhibits no abdominal bruit. There is no hepatosplenomegaly. There is no tenderness. There is no rebound.   Lymphadenopathy:        Head (right side): No submental and no submandibular adenopathy present.        Head (left side): No submental and no submandibular adenopathy present.        Right cervical: No superficial cervical, no deep cervical and no posterior cervical adenopathy present.       Left cervical: No superficial cervical, no deep cervical and no posterior cervical adenopathy present.        Right: No supraclavicular adenopathy present.        Left: No supraclavicular adenopathy present.   Neurological: She is alert and oriented to person, place, and time.   Skin: Skin is warm, dry and intact.   Psychiatric: She has a normal mood and affect. Her speech is normal and behavior is normal. Judgment and thought content normal. Cognition and memory are normal.     Blood pressure 100/60, pulse 72, temperature 98.8 °F (37.1 °C), temperature source Oral, resp. rate 18, height 5' 4" (1.626 m), weight 81.8 kg (180 lb 6.4 oz), SpO2 96 %.Body mass index is 30.97 " kg/m².          A/P:  1) anxiety.  Well controlled.  Continue with Zoloft.  If she develops any new or worsening symptoms or if she is interested in adjusting medication future, she will let me know.  I did refill her Xanax for p.r.n. use   2)   Insomnia.  Well controlled.  Continue with trazodone

## 2019-04-09 ENCOUNTER — CLINICAL SUPPORT (OUTPATIENT)
Dept: URGENT CARE | Facility: CLINIC | Age: 34
End: 2019-04-09
Payer: COMMERCIAL

## 2019-04-09 VITALS
OXYGEN SATURATION: 100 % | TEMPERATURE: 98 F | WEIGHT: 180 LBS | HEART RATE: 77 BPM | DIASTOLIC BLOOD PRESSURE: 89 MMHG | RESPIRATION RATE: 18 BRPM | HEIGHT: 64 IN | BODY MASS INDEX: 30.73 KG/M2 | SYSTOLIC BLOOD PRESSURE: 128 MMHG

## 2019-04-09 DIAGNOSIS — R05.9 COUGH: ICD-10-CM

## 2019-04-09 DIAGNOSIS — J40 BRONCHITIS: Primary | ICD-10-CM

## 2019-04-09 PROCEDURE — 71046 X-RAY EXAM CHEST 2 VIEWS: CPT | Mod: S$GLB,,, | Performed by: RADIOLOGY

## 2019-04-09 PROCEDURE — 99214 PR OFFICE/OUTPT VISIT, EST, LEVL IV, 30-39 MIN: ICD-10-PCS | Mod: 25,S$GLB,, | Performed by: PHYSICIAN ASSISTANT

## 2019-04-09 PROCEDURE — 94664 PR DEMO &/OR EVAL,PT USE,AEROSOL DEVICE: ICD-10-PCS | Mod: 25,S$GLB,, | Performed by: PHYSICIAN ASSISTANT

## 2019-04-09 PROCEDURE — 99214 OFFICE O/P EST MOD 30 MIN: CPT | Mod: 25,S$GLB,, | Performed by: PHYSICIAN ASSISTANT

## 2019-04-09 PROCEDURE — 71046 XR CHEST PA AND LATERAL: ICD-10-PCS | Mod: S$GLB,,, | Performed by: RADIOLOGY

## 2019-04-09 PROCEDURE — 94664 DEMO&/EVAL PT USE INHALER: CPT | Mod: 25,S$GLB,, | Performed by: PHYSICIAN ASSISTANT

## 2019-04-09 PROCEDURE — 94640 AIRWAY INHALATION TREATMENT: CPT | Mod: 59,S$GLB,, | Performed by: PHYSICIAN ASSISTANT

## 2019-04-09 PROCEDURE — 94640 PR INHAL RX, AIRWAY OBST/DX SPUTUM INDUCT: ICD-10-PCS | Mod: 59,S$GLB,, | Performed by: PHYSICIAN ASSISTANT

## 2019-04-09 RX ORDER — ALBUTEROL SULFATE 0.83 MG/ML
2.5 SOLUTION RESPIRATORY (INHALATION) EVERY 6 HOURS PRN
Qty: 1 BOX | Refills: 3 | Status: SHIPPED | OUTPATIENT
Start: 2019-04-09 | End: 2021-03-05

## 2019-04-09 RX ORDER — BENZONATATE 200 MG/1
200 CAPSULE ORAL 3 TIMES DAILY PRN
Qty: 60 CAPSULE | Refills: 1 | Status: SHIPPED | OUTPATIENT
Start: 2019-04-09 | End: 2019-04-19

## 2019-04-09 RX ORDER — ALBUTEROL SULFATE 90 UG/1
2 AEROSOL, METERED RESPIRATORY (INHALATION) EVERY 6 HOURS PRN
Qty: 18 G | Refills: 3 | Status: SHIPPED | OUTPATIENT
Start: 2019-04-09 | End: 2020-04-08

## 2019-04-09 RX ORDER — PREDNISONE 10 MG/1
TABLET ORAL
Qty: 18 TABLET | Refills: 0 | Status: SHIPPED | OUTPATIENT
Start: 2019-04-09 | End: 2020-03-06

## 2019-04-09 RX ORDER — ALBUTEROL SULFATE 0.83 MG/ML
2.5 SOLUTION RESPIRATORY (INHALATION)
Status: COMPLETED | OUTPATIENT
Start: 2019-04-09 | End: 2019-04-09

## 2019-04-09 RX ADMIN — ALBUTEROL SULFATE 2.5 MG: 0.83 SOLUTION RESPIRATORY (INHALATION) at 06:04

## 2019-04-09 NOTE — PROGRESS NOTES
"Subjective:       Patient ID: Christel Alejo is a 33 y.o. female.    Vitals:  height is 5' 4" (1.626 m) and weight is 81.6 kg (180 lb). Her oral temperature is 98.2 °F (36.8 °C). Her blood pressure is 128/89 and her pulse is 77. Her respiration is 18 and oxygen saturation is 100%.     Chief Complaint: Cough    Pt complains of productive cough, breathing heavy, and fatigue since last Wednesday. Pt is concern about pneumonia because her mom was diagnose with it two weeks ago    Cough   This is a new problem. The current episode started in the past 7 days. The problem has been gradually worsening. The problem occurs constantly. The cough is productive of sputum. Associated symptoms include wheezing. Pertinent negatives include no chills, ear pain, eye redness, fever, hemoptysis, myalgias, rash, sore throat or shortness of breath. Nothing aggravates the symptoms. Treatments tried: mucinex. The treatment provided no relief. Her past medical history is significant for bronchitis and pneumonia.       Constitution: Positive for fatigue. Negative for chills, sweating and fever.   HENT: Negative for ear pain, congestion, sinus pain, sinus pressure, sore throat and voice change.    Neck: Negative for painful lymph nodes.   Eyes: Negative for eye redness.   Respiratory: Positive for cough, sputum production and wheezing. Negative for chest tightness, bloody sputum, COPD, shortness of breath, stridor and asthma.    Gastrointestinal: Negative for nausea and vomiting.   Musculoskeletal: Negative for muscle ache.   Skin: Negative for rash.   Allergic/Immunologic: Negative for seasonal allergies and asthma.   Hematologic/Lymphatic: Negative for swollen lymph nodes.       Objective:      Physical Exam   Constitutional: She is oriented to person, place, and time. She appears well-developed and well-nourished. She is cooperative.  Non-toxic appearance. She appears ill. No distress.   HENT:   Head: Normocephalic and atraumatic. "   Right Ear: Hearing, tympanic membrane, external ear and ear canal normal.   Left Ear: Hearing, tympanic membrane, external ear and ear canal normal.   Nose: Nose normal. No mucosal edema, rhinorrhea or nasal deformity. No epistaxis. Right sinus exhibits no maxillary sinus tenderness and no frontal sinus tenderness. Left sinus exhibits no maxillary sinus tenderness and no frontal sinus tenderness.   Mouth/Throat: Uvula is midline, oropharynx is clear and moist and mucous membranes are normal. No trismus in the jaw. Normal dentition. No uvula swelling. No posterior oropharyngeal erythema.   Eyes: Conjunctivae and lids are normal. No scleral icterus.   Sclera clear bilat   Neck: Trachea normal, full passive range of motion without pain and phonation normal. Neck supple.   Cardiovascular: Normal rate, regular rhythm, normal heart sounds, intact distal pulses and normal pulses.   Pulmonary/Chest: Effort normal. No respiratory distress. She has decreased breath sounds.   Abdominal: Soft. Normal appearance and bowel sounds are normal. She exhibits no distension. There is no tenderness.   Musculoskeletal: Normal range of motion. She exhibits no edema or deformity.   Neurological: She is alert and oriented to person, place, and time. She exhibits normal muscle tone. Coordination normal.   Skin: Skin is warm, dry and intact. She is not diaphoretic. No pallor.   Psychiatric: She has a normal mood and affect. Her speech is normal and behavior is normal. Judgment and thought content normal. Cognition and memory are normal.   Nursing note and vitals reviewed.      Assessment:       1. Bronchitis    2. Cough        Plan:         Bronchitis  -     albuterol nebulizer solution 2.5 mg  -     predniSONE (DELTASONE) 10 MG tablet; Take two pills po x 5 days, 1 pill po x 5 days, 1/2 pill po until empty. Start 24 hours after injection.  Dispense: 18 tablet; Refill: 0  -     albuterol (PROVENTIL/VENTOLIN HFA) 90 mcg/actuation inhaler;  Inhale 2 puffs into the lungs every 6 (six) hours as needed for Wheezing. Rescue  Dispense: 18 g; Refill: 3  -     benzonatate (TESSALON) 200 MG capsule; Take 1 capsule (200 mg total) by mouth 3 (three) times daily as needed for Cough.  Dispense: 60 capsule; Refill: 1    Cough  -     XR CHEST PA AND LATERAL; Future; Expected date: 04/09/2019    Xr Chest Pa And Lateral    Result Date: 4/9/2019  EXAMINATION: XR CHEST PA AND LATERAL CLINICAL HISTORY: Cough TECHNIQUE: PA and lateral views of the chest were performed. COMPARISON: December 2018. FINDINGS: Cardiac silhouette is normal in size.  Lungs are symmetrically expanded.  No evidence of focal consolidative process, pneumothorax, or significant effusion.  No acute osseous abnormality identified.     No acute intrathoracic process identified. Electronically signed by: Alfonso Mahoney MD Date:    04/09/2019 Time:    18:13     Subjective improvement post nebulizer treatment. Patient to use ventolin inhaler and will decide later on getting home nebulizer kit.     Patient Instructions     Bronchitis, Viral (Adult)    You have a viral bronchitis. Bronchitis is inflammation and swelling of the lining of the lungs. This is often caused by an infection. Symptoms include a dry, hacking cough that is worse at night. The cough may bring up yellow-green mucus. You may also feel short of breath or wheeze. Other symptoms may include tiredness, chest discomfort, and chills.  Bronchitis that is caused by a virus is not treated with antibiotics. Instead, medicines may be given to help relieve symptoms. Symptoms can last up to 2 weeks, although the cough may last much longer.  This illness is contagious during the first few days and is spread through the air by coughing and sneezing, or by direct contact (touching the sick person and then touching your own eyes, nose, or mouth).  Most viral illnesses resolve within 10 to 14 days with rest and simple home remedies, although they may  sometimes last for several weeks.  Home care  · If symptoms are severe, rest at home for the first 2 to 3 days. When you go back to your usual activities, don't let yourself get too tired.  · Do not smoke. Also avoid being exposed to secondhand smoke.  · You may use over-the-counter medicine to control fever or pain, unless another pain medicine was prescribed. (Note: If you have chronic liver or kidney disease or have ever had a stomach ulcer or gastrointestinal bleeding, talk with your healthcare provider before using these medicines. Also talk to your provider if you are taking medicine to prevent blood clots.) Aspirin should never be given to anyone younger than 18 years of age who is ill with a viral infection or fever. It may cause severe liver or brain damage.  · Your appetite may be poor, so a light diet is fine. Avoid dehydration by drinking 6 to 8 glasses of fluids per day (such as water, soft drinks, sports drinks, juices, tea, or soup). Extra fluids will help loosen secretions in the nose and lungs.  · Over-the-counter cough, cold, and sore-throat medicines will not shorten the length of the illness, but they may help to reduce symptoms. (Note: Do not use decongestants if you have high blood pressure.)  Follow-up care  Follow up with your healthcare provider, or as advised. If you had an X-ray or ECG (electrocardiogram), a specialist will review it. You will be notified of any new findings that may affect your care.  Note: If you are age 65 or older, or if you have a chronic lung disease or condition that affects your immune system, or you smoke, talk to your healthcare provider about having pneumococcal vaccinations and a yearly influenza vaccination (flu shot).  When to seek medical advice  Call your healthcare provider right away if any of these occur:  · Fever of 100.4°F (38°C) or higher  · Coughing up increased amounts of colored sputum  · Weakness, drowsiness, headache, facial pain, ear pain, or a  stiff neck  Call 911, or get immediate medical care  Contact emergency services right away if any of these occur:  · Coughing up blood  · Worsening weakness, drowsiness, headache, or stiff neck  · Trouble breathing, wheezing, or pain with breathing  Date Last Reviewed: 9/13/2015  © 7020-3933 [employer]. [address] [place] [address]. All rights reserved. This information is not intended as a substitute for professional medical care. Always follow your healthcare professional's instructions.       If not allergic,take tylenol (acetominophen) for fever control, chills, or body aches every 4 hours. Do not exceed 4000 mg/ day.If not allergic, take Motrin (Ibuprofen) every 4 hours for fever, chills, pain or inflammation. Do not exceed 2400 mg/day. You can alternate taking tylenol and motrin.  If you were prescribed a narcotic medication, do not drive or operate heavy equipment or machinery while taking these medications.  You must understand that you've received an Urgent Care treatment only and that you may be released before all your medical problems are known or treated. You, the patient, will arrange for follow up care as instructed.  Follow up with your PCP or specialty clinic as directed in the next 1-2 weeks if not improved or as needed.  You can call [phone] to schedule an appointment with the appropriate provider.  If your condition worsens we recommend that you receive another evaluation at the emergency room immediately or contact your primary medical clinics after hours call service to discuss your concerns.  Please return here or go to the Emergency Department for any concerns or worsening of condition.

## 2019-04-09 NOTE — PATIENT INSTRUCTIONS
Bronchitis, Viral (Adult)    You have a viral bronchitis. Bronchitis is inflammation and swelling of the lining of the lungs. This is often caused by an infection. Symptoms include a dry, hacking cough that is worse at night. The cough may bring up yellow-green mucus. You may also feel short of breath or wheeze. Other symptoms may include tiredness, chest discomfort, and chills.  Bronchitis that is caused by a virus is not treated with antibiotics. Instead, medicines may be given to help relieve symptoms. Symptoms can last up to 2 weeks, although the cough may last much longer.  This illness is contagious during the first few days and is spread through the air by coughing and sneezing, or by direct contact (touching the sick person and then touching your own eyes, nose, or mouth).  Most viral illnesses resolve within 10 to 14 days with rest and simple home remedies, although they may sometimes last for several weeks.  Home care  · If symptoms are severe, rest at home for the first 2 to 3 days. When you go back to your usual activities, don't let yourself get too tired.  · Do not smoke. Also avoid being exposed to secondhand smoke.  · You may use over-the-counter medicine to control fever or pain, unless another pain medicine was prescribed. (Note: If you have chronic liver or kidney disease or have ever had a stomach ulcer or gastrointestinal bleeding, talk with your healthcare provider before using these medicines. Also talk to your provider if you are taking medicine to prevent blood clots.) Aspirin should never be given to anyone younger than 18 years of age who is ill with a viral infection or fever. It may cause severe liver or brain damage.  · Your appetite may be poor, so a light diet is fine. Avoid dehydration by drinking 6 to 8 glasses of fluids per day (such as water, soft drinks, sports drinks, juices, tea, or soup). Extra fluids will help loosen secretions in the nose and lungs.  · Over-the-counter  cough, cold, and sore-throat medicines will not shorten the length of the illness, but they may help to reduce symptoms. (Note: Do not use decongestants if you have high blood pressure.)  Follow-up care  Follow up with your healthcare provider, or as advised. If you had an X-ray or ECG (electrocardiogram), a specialist will review it. You will be notified of any new findings that may affect your care.  Note: If you are age 65 or older, or if you have a chronic lung disease or condition that affects your immune system, or you smoke, talk to your healthcare provider about having pneumococcal vaccinations and a yearly influenza vaccination (flu shot).  When to seek medical advice  Call your healthcare provider right away if any of these occur:  · Fever of 100.4°F (38°C) or higher  · Coughing up increased amounts of colored sputum  · Weakness, drowsiness, headache, facial pain, ear pain, or a stiff neck  Call 911, or get immediate medical care  Contact emergency services right away if any of these occur:  · Coughing up blood  · Worsening weakness, drowsiness, headache, or stiff neck  · Trouble breathing, wheezing, or pain with breathing  Date Last Reviewed: 9/13/2015 © 2000-2017 "[employer]. [address] [place] [address], [place] [address]. All rights reserved. This information is not intended as a substitute for professional medical care. Always follow your healthcare professional's instructions.       If not allergic,take tylenol (acetominophen) for fever control, chills, or body aches every 4 hours. Do not exceed 4000 mg/ day.If not allergic, take Motrin (Ibuprofen) every 4 hours for fever, chills, pain or inflammation. Do not exceed 2400 mg/day. You can alternate taking tylenol and motrin.  If you were prescribed a narcotic medication, do not drive or operate heavy equipment or machinery while taking these medications.  You must understand that you've received an Urgent Care treatment only and that you may  be released before all your medical problems are known or treated. You, the patient, will arrange for follow up care as instructed.  Follow up with your PCP or specialty clinic as directed in the next 1-2 weeks if not improved or as needed.  You can call [phone] to schedule an appointment with the appropriate provider.  If your condition worsens we recommend that you receive another evaluation at the emergency room immediately or contact your primary medical clinics after hours call service to discuss your concerns.  Please return here or go to the Emergency Department for any concerns or worsening of condition.      decreased ability to use arms for pushing/pulling/decreased ability to use legs for bridging/pushing

## 2019-12-17 ENCOUNTER — PATIENT OUTREACH (OUTPATIENT)
Dept: ADMINISTRATIVE | Facility: HOSPITAL | Age: 34
End: 2019-12-17

## 2019-12-25 ENCOUNTER — OFFICE VISIT (OUTPATIENT)
Dept: URGENT CARE | Facility: CLINIC | Age: 34
End: 2019-12-25
Payer: COMMERCIAL

## 2019-12-25 VITALS
TEMPERATURE: 98 F | DIASTOLIC BLOOD PRESSURE: 75 MMHG | HEART RATE: 82 BPM | BODY MASS INDEX: 30.73 KG/M2 | HEIGHT: 64 IN | WEIGHT: 180 LBS | OXYGEN SATURATION: 99 % | SYSTOLIC BLOOD PRESSURE: 113 MMHG

## 2019-12-25 DIAGNOSIS — R68.89 FLU-LIKE SYMPTOMS: ICD-10-CM

## 2019-12-25 DIAGNOSIS — J32.9 SINUSITIS, UNSPECIFIED CHRONICITY, UNSPECIFIED LOCATION: Primary | ICD-10-CM

## 2019-12-25 LAB
CTP QC/QA: YES
FLUAV AG NPH QL: NEGATIVE
FLUBV AG NPH QL: NEGATIVE

## 2019-12-25 PROCEDURE — 96372 THER/PROPH/DIAG INJ SC/IM: CPT | Mod: S$GLB,,, | Performed by: INTERNAL MEDICINE

## 2019-12-25 PROCEDURE — 96372 PR INJECTION,THERAP/PROPH/DIAG2ST, IM OR SUBCUT: ICD-10-PCS | Mod: S$GLB,,, | Performed by: INTERNAL MEDICINE

## 2019-12-25 PROCEDURE — 87804 INFLUENZA ASSAY W/OPTIC: CPT | Mod: QW,S$GLB,, | Performed by: INTERNAL MEDICINE

## 2019-12-25 PROCEDURE — 99214 PR OFFICE/OUTPT VISIT, EST, LEVL IV, 30-39 MIN: ICD-10-PCS | Mod: 25,S$GLB,, | Performed by: INTERNAL MEDICINE

## 2019-12-25 PROCEDURE — 99214 OFFICE O/P EST MOD 30 MIN: CPT | Mod: 25,S$GLB,, | Performed by: INTERNAL MEDICINE

## 2019-12-25 PROCEDURE — 87804 POCT INFLUENZA A/B: ICD-10-PCS | Mod: QW,S$GLB,, | Performed by: INTERNAL MEDICINE

## 2019-12-25 RX ORDER — DEXAMETHASONE SODIUM PHOSPHATE 100 MG/10ML
10 INJECTION INTRAMUSCULAR; INTRAVENOUS
Status: COMPLETED | OUTPATIENT
Start: 2019-12-25 | End: 2019-12-25

## 2019-12-25 RX ORDER — DOXYCYCLINE 100 MG/1
100 CAPSULE ORAL 2 TIMES DAILY
Qty: 14 CAPSULE | Refills: 0 | Status: SHIPPED | OUTPATIENT
Start: 2019-12-25 | End: 2020-01-01

## 2019-12-25 RX ADMIN — DEXAMETHASONE SODIUM PHOSPHATE 10 MG: 100 INJECTION INTRAMUSCULAR; INTRAVENOUS at 04:12

## 2019-12-25 NOTE — PROGRESS NOTES
"Subjective:       Patient ID: Christel Alejo is a 34 y.o. female.    Vitals:  height is 5' 4" (1.626 m) and weight is 81.6 kg (180 lb). Her temperature is 98.1 °F (36.7 °C). Her blood pressure is 113/75 and her pulse is 82. Her oxygen saturation is 99%.     Chief Complaint: Cough    Patient presents to clinic with cough and sinus congestion for approximately 1 week.    Cough   This is a new problem. The current episode started in the past 7 days. The problem has been unchanged. The cough is productive of sputum. Associated symptoms include chills, a fever, headaches, nasal congestion and postnasal drip. Pertinent negatives include no chest pain, ear congestion, ear pain, eye redness, heartburn, hemoptysis, myalgias, rash, rhinorrhea, sore throat, shortness of breath, sweats, weight loss or wheezing. Treatments tried: OTC cold and flu medication. The treatment provided mild relief. Her past medical history is significant for bronchitis. There is no history of asthma, bronchiectasis, COPD, emphysema, environmental allergies or pneumonia.       Constitution: Positive for chills, fatigue and fever. Negative for sweating.   HENT: Positive for congestion and postnasal drip. Negative for ear pain, sinus pain, sinus pressure, sore throat and voice change.    Neck: Negative for painful lymph nodes.   Cardiovascular: Negative for chest pain.   Eyes: Negative for eye redness.   Respiratory: Positive for chest tightness, cough and sputum production. Negative for bloody sputum, COPD, shortness of breath, stridor, wheezing and asthma.    Gastrointestinal: Negative for nausea, vomiting and heartburn.   Musculoskeletal: Negative for muscle ache.   Skin: Negative for rash.   Allergic/Immunologic: Negative for environmental allergies, seasonal allergies and asthma.   Neurological: Positive for headaches.   Hematologic/Lymphatic: Negative for swollen lymph nodes.       Objective:      Physical Exam   Constitutional: She is " oriented to person, place, and time. She appears well-developed and well-nourished. She is cooperative.  Non-toxic appearance. She does not appear ill. No distress.   HENT:   Head: Normocephalic and atraumatic.   Right Ear: Hearing, tympanic membrane, external ear and ear canal normal.   Left Ear: Hearing, tympanic membrane, external ear and ear canal normal.   Nose: Rhinorrhea present. No mucosal edema or nasal deformity. No epistaxis. Right sinus exhibits no maxillary sinus tenderness and no frontal sinus tenderness. Left sinus exhibits no maxillary sinus tenderness and no frontal sinus tenderness.   Mouth/Throat: Uvula is midline and mucous membranes are normal. No trismus in the jaw. Normal dentition. No uvula swelling. Posterior oropharyngeal erythema present.   Eyes: Conjunctivae and lids are normal. Right eye exhibits no discharge. Left eye exhibits no discharge. No scleral icterus.   Neck: Trachea normal, normal range of motion, full passive range of motion without pain and phonation normal. Neck supple.   Cardiovascular: Normal rate, regular rhythm, normal heart sounds, intact distal pulses and normal pulses.   Pulmonary/Chest: Effort normal and breath sounds normal. No respiratory distress.   Abdominal: Soft. Normal appearance and bowel sounds are normal. She exhibits no distension, no pulsatile midline mass and no mass. There is no tenderness.   Musculoskeletal: Normal range of motion. She exhibits no edema or deformity.   Neurological: She is alert and oriented to person, place, and time. She exhibits normal muscle tone. Coordination normal.   Skin: Skin is warm, dry, intact, not diaphoretic and not pale.   Psychiatric: She has a normal mood and affect. Her speech is normal and behavior is normal. Judgment and thought content normal. Cognition and memory are normal.   Nursing note and vitals reviewed.        Assessment:       1. Sinusitis, unspecified chronicity, unspecified location    2. Flu-like  symptoms        Plan:         Sinusitis, unspecified chronicity, unspecified location  -     POCT Influenza A/B  -     doxycycline (VIBRAMYCIN) 100 MG Cap; Take 1 capsule (100 mg total) by mouth 2 (two) times daily. for 7 days  Dispense: 14 capsule; Refill: 0  -     dexamethasone injection 10 mg    Flu-like symptoms  -     POCT Influenza A/B      Patient Instructions     Sinusitis (Antibiotic Treatment)    The sinuses are air-filled spaces within the bones of the face. They connect to the inside of the nose. Sinusitis is an inflammation of the tissue lining the sinus cavity. Sinus inflammation can occur during a cold. It can also be due to allergies to pollens and other particles in the air. Sinusitis can cause symptoms of sinus congestion and fullness. A sinus infection causes fever, headache and facial pain. There is often green or yellow drainage from the nose or into the back of the throat (post-nasal drip). You have been given antibiotics to treat this condition.  Home care:  · Take the full course of antibiotics as instructed. Do not stop taking them, even if you feel better.  · Drink plenty of water, hot tea, and other liquids. This may help thin mucus. It also may promote sinus drainage.  · Heat may help soothe painful areas of the face. Use a towel soaked in hot water. Or,  the shower and direct the hot spray onto your face. Using a vaporizer along with a menthol rub at night may also help.   · An expectorant containing guaifenesin may help thin the mucus and promote drainage from the sinuses.  · Over-the-counter decongestants may be used unless a similar medicine was prescribed. Nasal sprays work the fastest. Use one that contains phenylephrine or oxymetazoline. First blow the nose gently. Then use the spray. Do not use these medicines more often than directed on the label or symptoms may get worse. You may also use tablets containing pseudoephedrine. Avoid products that combine ingredients,  because side effects may be increased. Read labels. You can also ask the pharmacist for help. (NOTE: Persons with high blood pressure should not use decongestants. They can raise blood pressure.)  · Over-the-counter antihistamines may help if allergies contributed to your sinusitis.    · Do not use nasal rinses or irrigation during an acute sinus infection, unless told to by your health care provider. Rinsing may spread the infection to other sinuses.  · Use acetaminophen or ibuprofen to control pain, unless another pain medicine was prescribed. (If you have chronic liver or kidney disease or ever had a stomach ulcer, talk with your doctor before using these medicines. Aspirin should never be used in anyone under 18 years of age who is ill with a fever. It may cause severe liver damage.)  · Don't smoke. This can worsen symptoms.  Follow-up care  Follow up with your healthcare provider or our staff if you are not improving within the next week.  When to seek medical advice  Call your healthcare provider if any of these occur:  · Facial pain or headache becoming more severe  · Stiff neck  · Unusual drowsiness or confusion  · Swelling of the forehead or eyelids  · Vision problems, including blurred or double vision  · Fever of 100.4ºF (38ºC) or higher, or as directed by your healthcare provider  · Seizure  · Breathing problems  · Symptoms not resolving within 10 days  Date Last Reviewed: 4/13/2015  © 2206-9823 [employer]. [address] [place] [address]. All rights reserved. This information is not intended as a substitute for professional medical care. Always follow your healthcare professional's instructions.           My notes were dictated with [employer]. Any misspellings or nonsensical grammar should be attributed to its use and allowances made for errors and typographic syntactical error(s).

## 2019-12-25 NOTE — PATIENT INSTRUCTIONS
Sinusitis (Antibiotic Treatment)    The sinuses are air-filled spaces within the bones of the face. They connect to the inside of the nose. Sinusitis is an inflammation of the tissue lining the sinus cavity. Sinus inflammation can occur during a cold. It can also be due to allergies to pollens and other particles in the air. Sinusitis can cause symptoms of sinus congestion and fullness. A sinus infection causes fever, headache and facial pain. There is often green or yellow drainage from the nose or into the back of the throat (post-nasal drip). You have been given antibiotics to treat this condition.  Home care:  · Take the full course of antibiotics as instructed. Do not stop taking them, even if you feel better.  · Drink plenty of water, hot tea, and other liquids. This may help thin mucus. It also may promote sinus drainage.  · Heat may help soothe painful areas of the face. Use a towel soaked in hot water. Or,  the shower and direct the hot spray onto your face. Using a vaporizer along with a menthol rub at night may also help.   · An expectorant containing guaifenesin may help thin the mucus and promote drainage from the sinuses.  · Over-the-counter decongestants may be used unless a similar medicine was prescribed. Nasal sprays work the fastest. Use one that contains phenylephrine or oxymetazoline. First blow the nose gently. Then use the spray. Do not use these medicines more often than directed on the label or symptoms may get worse. You may also use tablets containing pseudoephedrine. Avoid products that combine ingredients, because side effects may be increased. Read labels. You can also ask the pharmacist for help. (NOTE: Persons with high blood pressure should not use decongestants. They can raise blood pressure.)  · Over-the-counter antihistamines may help if allergies contributed to your sinusitis.    · Do not use nasal rinses or irrigation during an acute sinus infection, unless told to by  your health care provider. Rinsing may spread the infection to other sinuses.  · Use acetaminophen or ibuprofen to control pain, unless another pain medicine was prescribed. (If you have chronic liver or kidney disease or ever had a stomach ulcer, talk with your doctor before using these medicines. Aspirin should never be used in anyone under 18 years of age who is ill with a fever. It may cause severe liver damage.)  · Don't smoke. This can worsen symptoms.  Follow-up care  Follow up with your healthcare provider or our staff if you are not improving within the next week.  When to seek medical advice  Call your healthcare provider if any of these occur:  · Facial pain or headache becoming more severe  · Stiff neck  · Unusual drowsiness or confusion  · Swelling of the forehead or eyelids  · Vision problems, including blurred or double vision  · Fever of 100.4ºF (38ºC) or higher, or as directed by your healthcare provider  · Seizure  · Breathing problems  · Symptoms not resolving within 10 days  Date Last Reviewed: 4/13/2015  © 5219-5599 [employer]. [address] [place] [address], [place] [address]. All rights reserved. This information is not intended as a substitute for professional medical care. Always follow your healthcare professional's instructions.

## 2020-03-02 DIAGNOSIS — K21.9 LPRD (LARYNGOPHARYNGEAL REFLUX DISEASE): ICD-10-CM

## 2020-03-06 ENCOUNTER — OFFICE VISIT (OUTPATIENT)
Dept: FAMILY MEDICINE | Facility: CLINIC | Age: 35
End: 2020-03-06
Payer: COMMERCIAL

## 2020-03-06 VITALS
BODY MASS INDEX: 31.79 KG/M2 | HEART RATE: 83 BPM | DIASTOLIC BLOOD PRESSURE: 68 MMHG | HEIGHT: 65 IN | WEIGHT: 190.81 LBS | RESPIRATION RATE: 16 BRPM | TEMPERATURE: 99 F | SYSTOLIC BLOOD PRESSURE: 116 MMHG

## 2020-03-06 DIAGNOSIS — M25.539 PAIN IN WRIST, UNSPECIFIED LATERALITY: ICD-10-CM

## 2020-03-06 DIAGNOSIS — R53.83 FATIGUE, UNSPECIFIED TYPE: Primary | ICD-10-CM

## 2020-03-06 DIAGNOSIS — M65.4 DE QUERVAIN'S DISEASE (TENOSYNOVITIS): ICD-10-CM

## 2020-03-06 DIAGNOSIS — R22.30 AXILLARY FULLNESS: ICD-10-CM

## 2020-03-06 DIAGNOSIS — F41.9 ANXIETY: ICD-10-CM

## 2020-03-06 LAB
ALBUMIN SERPL BCP-MCNC: 3.9 G/DL (ref 3.5–5.2)
ALP SERPL-CCNC: 83 U/L (ref 55–135)
ALT SERPL W/O P-5'-P-CCNC: 30 U/L (ref 10–44)
ANION GAP SERPL CALC-SCNC: 8 MMOL/L (ref 8–16)
AST SERPL-CCNC: 19 U/L (ref 10–40)
BASOPHILS # BLD AUTO: 0.03 K/UL (ref 0–0.2)
BASOPHILS NFR BLD: 0.5 % (ref 0–1.9)
BILIRUB SERPL-MCNC: 0.6 MG/DL (ref 0.1–1)
BUN SERPL-MCNC: 10 MG/DL (ref 6–20)
CALCIUM SERPL-MCNC: 9.3 MG/DL (ref 8.7–10.5)
CHLORIDE SERPL-SCNC: 108 MMOL/L (ref 95–110)
CO2 SERPL-SCNC: 23 MMOL/L (ref 23–29)
CREAT SERPL-MCNC: 0.7 MG/DL (ref 0.5–1.4)
DIFFERENTIAL METHOD: ABNORMAL
EOSINOPHIL # BLD AUTO: 0 K/UL (ref 0–0.5)
EOSINOPHIL NFR BLD: 0.7 % (ref 0–8)
ERYTHROCYTE [DISTWIDTH] IN BLOOD BY AUTOMATED COUNT: 12.8 % (ref 11.5–14.5)
EST. GFR  (AFRICAN AMERICAN): >60 ML/MIN/1.73 M^2
EST. GFR  (NON AFRICAN AMERICAN): >60 ML/MIN/1.73 M^2
GLUCOSE SERPL-MCNC: 104 MG/DL (ref 70–110)
HCT VFR BLD AUTO: 43 % (ref 37–48.5)
HGB BLD-MCNC: 13.6 G/DL (ref 12–16)
IMM GRANULOCYTES # BLD AUTO: 0.02 K/UL (ref 0–0.04)
IMM GRANULOCYTES NFR BLD AUTO: 0.4 % (ref 0–0.5)
LYMPHOCYTES # BLD AUTO: 1.8 K/UL (ref 1–4.8)
LYMPHOCYTES NFR BLD: 31.5 % (ref 18–48)
MCH RBC QN AUTO: 29.8 PG (ref 27–31)
MCHC RBC AUTO-ENTMCNC: 31.6 G/DL (ref 32–36)
MCV RBC AUTO: 94 FL (ref 82–98)
MONOCYTES # BLD AUTO: 0.4 K/UL (ref 0.3–1)
MONOCYTES NFR BLD: 7.4 % (ref 4–15)
NEUTROPHILS # BLD AUTO: 3.4 K/UL (ref 1.8–7.7)
NEUTROPHILS NFR BLD: 59.5 % (ref 38–73)
NRBC BLD-RTO: 0 /100 WBC
PLATELET # BLD AUTO: 248 K/UL (ref 150–350)
PMV BLD AUTO: 12.9 FL (ref 9.2–12.9)
POTASSIUM SERPL-SCNC: 4.2 MMOL/L (ref 3.5–5.1)
PROT SERPL-MCNC: 7.5 G/DL (ref 6–8.4)
RBC # BLD AUTO: 4.57 M/UL (ref 4–5.4)
SODIUM SERPL-SCNC: 139 MMOL/L (ref 136–145)
TSH SERPL DL<=0.005 MIU/L-ACNC: 1 UIU/ML (ref 0.4–4)
WBC # BLD AUTO: 5.68 K/UL (ref 3.9–12.7)

## 2020-03-06 PROCEDURE — 99214 OFFICE O/P EST MOD 30 MIN: CPT | Mod: S$GLB,,, | Performed by: FAMILY MEDICINE

## 2020-03-06 PROCEDURE — 80053 COMPREHEN METABOLIC PANEL: CPT

## 2020-03-06 PROCEDURE — 3008F PR BODY MASS INDEX (BMI) DOCUMENTED: ICD-10-PCS | Mod: CPTII,S$GLB,, | Performed by: FAMILY MEDICINE

## 2020-03-06 PROCEDURE — 36415 COLL VENOUS BLD VENIPUNCTURE: CPT | Mod: S$GLB,,, | Performed by: FAMILY MEDICINE

## 2020-03-06 PROCEDURE — 82607 VITAMIN B-12: CPT

## 2020-03-06 PROCEDURE — 85025 COMPLETE CBC W/AUTO DIFF WBC: CPT

## 2020-03-06 PROCEDURE — 36415 PR COLLECTION VENOUS BLOOD,VENIPUNCTURE: ICD-10-PCS | Mod: S$GLB,,, | Performed by: FAMILY MEDICINE

## 2020-03-06 PROCEDURE — 83036 HEMOGLOBIN GLYCOSYLATED A1C: CPT

## 2020-03-06 PROCEDURE — 99214 PR OFFICE/OUTPT VISIT, EST, LEVL IV, 30-39 MIN: ICD-10-PCS | Mod: S$GLB,,, | Performed by: FAMILY MEDICINE

## 2020-03-06 PROCEDURE — 84443 ASSAY THYROID STIM HORMONE: CPT

## 2020-03-06 PROCEDURE — 3008F BODY MASS INDEX DOCD: CPT | Mod: CPTII,S$GLB,, | Performed by: FAMILY MEDICINE

## 2020-03-06 RX ORDER — SERTRALINE HYDROCHLORIDE 50 MG/1
50 TABLET, FILM COATED ORAL DAILY
Qty: 30 TABLET | Refills: 1 | Status: SHIPPED | OUTPATIENT
Start: 2020-03-06 | End: 2020-05-05

## 2020-03-06 RX ORDER — IBUPROFEN 800 MG/1
800 TABLET ORAL 3 TIMES DAILY PRN
Qty: 45 TABLET | Refills: 1 | Status: SHIPPED | OUTPATIENT
Start: 2020-03-06 | End: 2020-04-09

## 2020-03-06 NOTE — PROGRESS NOTES
Venipuncture performed with 21 gauge butterfly, x's 1 attempt,  to R Cephalic vein.  Specimens collected per orders.      Pressure dressing applied to site, instructed patient to remove dressing in 10-15 minutes, OK to re-adjust dressing if pressure causing any discomfort, to observe closely for numbness and/or discoloration to hand or fingers, and to notify provider if bleeding persists after applying constant pressure lasting 30 minutes.

## 2020-03-07 LAB
ESTIMATED AVG GLUCOSE: 105 MG/DL (ref 68–131)
HBA1C MFR BLD HPLC: 5.3 % (ref 4–5.6)
VIT B12 SERPL-MCNC: 402 PG/ML (ref 210–950)

## 2020-03-09 ENCOUNTER — PATIENT MESSAGE (OUTPATIENT)
Dept: FAMILY MEDICINE | Facility: CLINIC | Age: 35
End: 2020-03-09

## 2020-03-09 DIAGNOSIS — G47.33 OSA (OBSTRUCTIVE SLEEP APNEA): Primary | ICD-10-CM

## 2020-03-09 NOTE — PROGRESS NOTES
Subjective:       Patient ID: Christel Alejo is a 34 y.o. female.    Chief Complaint: Follow-up (anxiety ) and Pain (sternum, bilateral wrists)    HPI   The patient is a 34-year-old who is here today for follow-up.  Her last visit was a year ago.  Today we discussed the followin)  Anxiety.  She is having a lot of stress at home and at work.  She has been feeling more anxious.  She has been having trouble focusing and concentrating at work.  Her motivation level has been low.  She is currently taking Zoloft but does not find this to be as effective as it was previously.  She denies any SI or HI  2) fatigue.  She feels tired all the time.  She has started snoring but it is unclear if she has any apneic episodes.  She does not feel rested in the morning.  She denies any depression  3) sternal pain.  She is having pain in her sternum.  She notices this when she bends over sometimes.  She denies any injury or trauma to this area  4) wrist pain.  She has pain in her wrist.  She localizes this to the radial side of her right wrist with some radiation of this pain proximally.  Her wrist pain is affected by certain activities.  Her wrist is tender to the touch.  She has trouble opening things because of the pain  5) lymph nodes.  She noticed that she is having lymph nodes in her neck which are coming and going.  Today they are not present.  She also notices that she has fullness in her left axillary area that she believes is a lymph node.  She denies any fevers or night sweats.      Review of systems is remarkable for weight gain.  She has gained 10 lb in the past year and 15 lb since starting the Zoloft in 2019.  She is not certain what is causing her weight gain    Review of Systems   Constitutional: Positive for unexpected weight change. Negative for appetite change, chills, diaphoresis, fatigue and fever.   HENT: Negative for congestion, ear pain, postnasal drip, rhinorrhea, sinus pressure,  sneezing, sore throat and trouble swallowing.    Eyes: Negative for pain, discharge and visual disturbance.   Respiratory: Negative for cough, chest tightness, shortness of breath and wheezing.    Cardiovascular: Negative for chest pain, palpitations and leg swelling.   Gastrointestinal: Negative for abdominal distention, abdominal pain, blood in stool, constipation, diarrhea, nausea and vomiting.   Musculoskeletal:        Per HPI   Skin: Negative for rash.   Psychiatric/Behavioral: Positive for decreased concentration. Negative for dysphoric mood, self-injury, sleep disturbance and suicidal ideas. The patient is nervous/anxious.        Objective:      Physical Exam   Constitutional: She is oriented to person, place, and time. She appears well-developed and well-nourished. No distress.   HENT:   Head: Normocephalic and atraumatic.   Right Ear: Hearing, tympanic membrane, external ear and ear canal normal.   Left Ear: Hearing, tympanic membrane, external ear and ear canal normal.   Nose: Nose normal.   Mouth/Throat: Oropharynx is clear and moist and mucous membranes are normal. No oral lesions. No oropharyngeal exudate, posterior oropharyngeal edema or posterior oropharyngeal erythema.   Eyes: Pupils are equal, round, and reactive to light. Conjunctivae, EOM and lids are normal. No scleral icterus.   Neck: Normal range of motion. Neck supple. Carotid bruit is not present. No thyroid mass and no thyromegaly present.   Cardiovascular: Normal rate, regular rhythm and normal heart sounds.  No extrasystoles are present. PMI is not displaced. Exam reveals no gallop.   No murmur heard.  Pulmonary/Chest: Effort normal and breath sounds normal. No accessory muscle usage. No respiratory distress.   Clear to auscultation bilaterally.   Abdominal: Soft. Normal appearance and bowel sounds are normal. She exhibits no abdominal bruit. There is no hepatosplenomegaly. There is no tenderness. There is no rebound.   Lymphadenopathy:     "    Head (right side): No submental and no submandibular adenopathy present.        Head (left side): No submental and no submandibular adenopathy present.     She has no cervical adenopathy.        Right cervical: No superficial cervical, no deep cervical and no posterior cervical adenopathy present.       Left cervical: No superficial cervical, no deep cervical and no posterior cervical adenopathy present.     She has no axillary adenopathy.        Right: No supraclavicular adenopathy present.        Left: No supraclavicular adenopathy present.   She does have soft tissue fullness in the left axillary region but I do not palpate a discrete mass   Neurological: She is alert and oriented to person, place, and time.   Skin: Skin is warm, dry and intact.   Psychiatric: Her speech is normal and behavior is normal. Judgment and thought content normal. Her mood appears anxious. Cognition and memory are normal.     Blood pressure 116/68, pulse 83, temperature 99.2 °F (37.3 °C), temperature source Oral, resp. rate 16, height 5' 5" (1.651 m), weight 86.5 kg (190 lb 12.9 oz).Body mass index is 31.75 kg/m².            A/P:  1) anxiety.  Uncontrolled.  We are going to increase her Zoloft to 100 mg once a day.  I will see her back in 4-6 weeks for follow-up.   If she develops any new worsening symptoms, she will let me know    2) fatigue.  Likely multifactorial.  We will check labs today.  If these are unremarkable, we will consider sleep evaluation  3) pain over the xiphoid process.  Intermittent.  If this persists or worsens, she will let me know.  She was given prescription for Motrin for p.r.n. use  4) wrist pain with de Quervain's tenosynovitis.  New.  We will try Motrin as above and wrist splints.  We will refer her to the orthopedist to be considered for steroid injection  5) cervical lymphadenopathy per her report.  New and intermittent - currently not present.  If this becomes a persistent issue or if she develops any " fevers or night sweats, she will let me know  6) left axillary fullness.  New.  We will check an ultrasound of this area            I will see her back in 4-6 weeks or sooner if needed

## 2020-03-16 ENCOUNTER — HOSPITAL ENCOUNTER (OUTPATIENT)
Dept: RADIOLOGY | Facility: HOSPITAL | Age: 35
Discharge: HOME | End: 2020-03-16
Attending: FAMILY MEDICINE
Payer: COMMERCIAL

## 2020-03-16 DIAGNOSIS — R22.30 AXILLARY FULLNESS: ICD-10-CM

## 2020-03-16 PROCEDURE — 76882 US LMTD JT/FCL EVL NVASC XTR: CPT | Mod: 26,,, | Performed by: RADIOLOGY

## 2020-03-16 PROCEDURE — 76882 US LMTD JT/FCL EVL NVASC XTR: CPT | Mod: TC,PO

## 2020-03-16 PROCEDURE — 76882 US SOFT TISSUE AXILLA: ICD-10-PCS | Mod: 26,,, | Performed by: RADIOLOGY

## 2020-03-18 ENCOUNTER — PATIENT MESSAGE (OUTPATIENT)
Dept: FAMILY MEDICINE | Facility: CLINIC | Age: 35
End: 2020-03-18

## 2020-03-26 RX ORDER — TRAZODONE HYDROCHLORIDE 50 MG/1
TABLET ORAL
Qty: 45 TABLET | Refills: 3 | Status: SHIPPED | OUTPATIENT
Start: 2020-03-26 | End: 2021-02-28

## 2020-03-26 RX ORDER — TRAZODONE HYDROCHLORIDE 50 MG/1
TABLET ORAL
Qty: 45 TABLET | Refills: 3 | Status: CANCELLED | OUTPATIENT
Start: 2020-03-26

## 2020-04-03 RX ORDER — SERTRALINE HYDROCHLORIDE 25 MG/1
TABLET, FILM COATED ORAL
Qty: 90 TABLET | Refills: 3 | OUTPATIENT
Start: 2020-04-03

## 2020-04-07 ENCOUNTER — OFFICE VISIT (OUTPATIENT)
Dept: FAMILY MEDICINE | Facility: CLINIC | Age: 35
End: 2020-04-07
Payer: COMMERCIAL

## 2020-04-07 DIAGNOSIS — F41.9 ANXIETY: ICD-10-CM

## 2020-04-07 DIAGNOSIS — F32.A DEPRESSION, UNSPECIFIED DEPRESSION TYPE: Primary | ICD-10-CM

## 2020-04-07 DIAGNOSIS — R53.83 FATIGUE, UNSPECIFIED TYPE: ICD-10-CM

## 2020-04-07 PROCEDURE — 99211 PR OFFICE/OUTPT VISIT, EST, LEVL I: ICD-10-PCS | Mod: 95,,, | Performed by: FAMILY MEDICINE

## 2020-04-07 PROCEDURE — 99211 OFF/OP EST MAY X REQ PHY/QHP: CPT | Mod: 95,,, | Performed by: FAMILY MEDICINE

## 2020-04-07 RX ORDER — BUPROPION HYDROCHLORIDE 150 MG/1
150 TABLET ORAL DAILY
Qty: 30 TABLET | Refills: 1 | Status: SHIPPED | OUTPATIENT
Start: 2020-04-07 | End: 2020-05-14

## 2020-04-07 NOTE — PROGRESS NOTES
Subjective:       Patient ID: Christel Alejo is a 34 y.o. female.    Chief Complaint: Follow-up    HPI   The patient is a 34-year-old who is here today for short-term follow-up.  She continues to feel tired and fatigued despite sleeping 8-9 hours.  She is unmotivated to do things.  She has having trouble focusing and concentrating even on simple tasks.  She believes she may have ADD as she sees this in her children, her  and her brother.  She denies any depression or anxiety.  She does feel that the higher dose of Zoloft has helped her anxiety with the current pandemic.  She is mostly sleeping well with the trazodone although sometimes her  wakes her up because she is snoring and sometimes she wakes up because her  is snoring.    Since her last visit, she has been unable to see the orthopedist and unable to have her sleep evaluation due to the covid 19    Review of Systems   Constitutional: Positive for fatigue. Negative for appetite change, chills, diaphoresis, fever and unexpected weight change.   HENT: Negative for congestion, ear pain, postnasal drip, rhinorrhea, sinus pressure, sneezing, sore throat and trouble swallowing.    Eyes: Negative for pain, discharge and visual disturbance.   Respiratory: Negative for cough, chest tightness, shortness of breath and wheezing.    Cardiovascular: Negative for chest pain, palpitations and leg swelling.   Gastrointestinal: Negative for abdominal distention, abdominal pain, blood in stool, constipation, diarrhea, nausea and vomiting.   Skin: Negative for rash.   Psychiatric/Behavioral: Positive for decreased concentration. Negative for dysphoric mood, self-injury, sleep disturbance and suicidal ideas. The patient is not nervous/anxious.        Objective:      Physical Exam   Constitutional: She appears well-developed and well-nourished. She does not have a sickly appearance. She does not appear ill. No distress.     There were no vitals taken for  this visit.There is no height or weight on file to calculate BMI.          A/P:  1)  depression and anxiety.  Well controlled.  Continue with Zoloft  2) concerns regarding adult ADD.  I did refer her to psychiatry for further evaluation management.  In the meantime, we did discuss trying Wellbutrin  mg once a day to see if this would help.    3) insomnia.  Fairly well controlled.  Continue with trazodone.  I also encouraged her proceed with her sleep evaluation as previously discussed      The patient location is: home  The chief complaint leading to consultation is: fatigue and lack of focus  Visit type: Virtual visit with audio only due to video issues  Total time spent with patient: 7 min    Each patient to whom he or she provides medical services by telemedicine is:  (1) informed of the relationship between the physician and patient and the respective role of any other health care provider with respect to management of the patient; and (2) notified that he or she may decline to receive medical services by telemedicine and may withdraw from such care at any time.

## 2020-04-07 NOTE — PATIENT INSTRUCTIONS
Psychiatric References - evaluated and treated for possible adult ADD      [facility]  [phone]      [employer] ([facility]) [place]  [phone]  or  [email or web address]       [facility]  [address] [place] [address] [phone]  FAX [phone]  [email or web address]    [facility]  [address]  [place] [address]  Phone: [phone]  Fax: [phone]      [facility]  [address] [place] [address]  Phone: [phone]  Fax: [phone]    [facility]  [address], [place] [address]  [phone]    [facility]  [address] [place] [address]

## 2020-04-09 RX ORDER — IBUPROFEN 800 MG/1
TABLET ORAL
Qty: 45 TABLET | Refills: 1 | Status: SHIPPED | OUTPATIENT
Start: 2020-04-09 | End: 2021-03-05

## 2020-05-05 RX ORDER — SERTRALINE HYDROCHLORIDE 50 MG/1
TABLET, FILM COATED ORAL
Qty: 30 TABLET | Refills: 2 | Status: SHIPPED | OUTPATIENT
Start: 2020-05-05 | End: 2020-08-06

## 2020-05-14 RX ORDER — BUPROPION HYDROCHLORIDE 150 MG/1
TABLET ORAL
Qty: 30 TABLET | Refills: 1 | Status: SHIPPED | OUTPATIENT
Start: 2020-05-14 | End: 2020-11-07 | Stop reason: SDUPTHER

## 2020-06-01 ENCOUNTER — OFFICE VISIT (OUTPATIENT)
Dept: ORTHOPEDICS | Facility: CLINIC | Age: 35
End: 2020-06-01
Payer: COMMERCIAL

## 2020-06-01 ENCOUNTER — HOSPITAL ENCOUNTER (OUTPATIENT)
Dept: RADIOLOGY | Facility: HOSPITAL | Age: 35
Discharge: HOME | End: 2020-06-01
Attending: ORTHOPAEDIC SURGERY
Payer: COMMERCIAL

## 2020-06-01 VITALS
WEIGHT: 190.69 LBS | SYSTOLIC BLOOD PRESSURE: 131 MMHG | DIASTOLIC BLOOD PRESSURE: 81 MMHG | BODY MASS INDEX: 31.77 KG/M2 | HEIGHT: 65 IN | HEART RATE: 85 BPM

## 2020-06-01 DIAGNOSIS — M65.4 DE QUERVAIN'S DISEASE (TENOSYNOVITIS): ICD-10-CM

## 2020-06-01 DIAGNOSIS — M25.532 BILATERAL WRIST PAIN: Primary | ICD-10-CM

## 2020-06-01 DIAGNOSIS — M25.532 BILATERAL WRIST PAIN: ICD-10-CM

## 2020-06-01 DIAGNOSIS — M25.531 BILATERAL WRIST PAIN: Primary | ICD-10-CM

## 2020-06-01 DIAGNOSIS — M25.531 BILATERAL WRIST PAIN: ICD-10-CM

## 2020-06-01 PROCEDURE — 99999 PR PBB SHADOW E&M-EST. PATIENT-LVL III: ICD-10-PCS | Mod: PBBFAC,,, | Performed by: ORTHOPAEDIC SURGERY

## 2020-06-01 PROCEDURE — 99999 PR PBB SHADOW E&M-EST. PATIENT-LVL III: CPT | Mod: PBBFAC,,, | Performed by: ORTHOPAEDIC SURGERY

## 2020-06-01 PROCEDURE — 73110 X-RAY EXAM OF WRIST: CPT | Mod: 26,RT,, | Performed by: RADIOLOGY

## 2020-06-01 PROCEDURE — 3008F BODY MASS INDEX DOCD: CPT | Mod: CPTII,S$GLB,, | Performed by: ORTHOPAEDIC SURGERY

## 2020-06-01 PROCEDURE — 73110 X-RAY EXAM OF WRIST: CPT | Mod: TC,PO,RT

## 2020-06-01 PROCEDURE — 73110 XR WRIST COMPLETE 3 VIEWS RIGHT: ICD-10-PCS | Mod: 26,RT,, | Performed by: RADIOLOGY

## 2020-06-01 PROCEDURE — 3008F PR BODY MASS INDEX (BMI) DOCUMENTED: ICD-10-PCS | Mod: CPTII,S$GLB,, | Performed by: ORTHOPAEDIC SURGERY

## 2020-06-01 PROCEDURE — 99203 OFFICE O/P NEW LOW 30 MIN: CPT | Mod: S$GLB,,, | Performed by: ORTHOPAEDIC SURGERY

## 2020-06-01 PROCEDURE — 99203 PR OFFICE/OUTPT VISIT, NEW, LEVL III, 30-44 MIN: ICD-10-PCS | Mod: S$GLB,,, | Performed by: ORTHOPAEDIC SURGERY

## 2020-06-01 NOTE — PROGRESS NOTES
6/1/2020    Chief Complaint:  Chief Complaint   Patient presents with    Right Wrist - Pain    Left Wrist - Pain       HPI:  Christel Alejo is a 34 y.o. female, who presents to clinic today has a history of bilateral wrist pain.  She states that the right is significantly worse than left.  She states that activities such as stapling of cause severe pain to shoot up her right forearm.  She states that she has had pain in both of her wrist for several years the right has always been worse than left.  She states that over the last couple of weeks to months she has had significant worsening of the pain in her right wrist.  She has not had any treatment or evaluation.  She has no other complaints period    PMHX:  Past Medical History:   Diagnosis Date    Allergy     Anxiety     Depression     History of cervical dysplasia     s/p conization followers with Dr Vargas       PSHX:  Past Surgical History:   Procedure Laterality Date    LYMPH NODE DISSECTION      one behind left ear as teenager       FMHX:  Family History   Problem Relation Age of Onset    Alcohol abuse Father     Cancer Father         BCC    Drug abuse Father     Heart disease Father 55        MI with 3 stents    Asthma Brother     ADD / ADHD Brother     ADD / ADHD Son     Arthritis Maternal Grandmother     Cancer Maternal Grandmother         breast    Diabetes Maternal Grandmother     Early death Maternal Grandmother     Heart disease Maternal Grandmother     Cataracts Maternal Grandmother     Cancer Maternal Grandfather         colon cancer    Diabetes Maternal Grandfather     Early death Maternal Grandfather     Heart disease Maternal Grandfather        SOCHX:  Social History     Tobacco Use    Smoking status: Never Smoker    Smokeless tobacco: Never Used   Substance Use Topics    Alcohol use: Yes     Comment: once in a while       ALLERGIES:  Paxil [paroxetine hcl]    CURRENT MEDICATIONS:  Current Outpatient Medications on  File Prior to Visit   Medication Sig Dispense Refill    albuterol (PROVENTIL) 2.5 mg /3 mL (0.083 %) nebulizer solution Take 3 mLs (2.5 mg total) by nebulization every 6 (six) hours as needed for Wheezing. Rescue 1 Box 3    ALPRAZolam (XANAX) 0.5 MG tablet Take 1 tablet (0.5 mg total) by mouth daily as needed for Anxiety. 30 tablet 0    buPROPion (WELLBUTRIN XL) 150 MG TB24 tablet TAKE 1 TABLET(150 MG) BY MOUTH EVERY DAY 30 tablet 1    cetirizine (ZYRTEC) 10 MG tablet Take 10 mg by mouth once daily.      ibuprofen (ADVIL,MOTRIN) 800 MG tablet TAKE 1 TABLET(800 MG) BY MOUTH THREE TIMES DAILY AS NEEDED FOR PAIN 45 tablet 1    INTRAUTERINE DEVICE, IUD, IU by Intrauterine route.      omeprazole (PRILOSEC) 40 MG capsule Take 1 capsule (40 mg total) by mouth before breakfast. Take on empty stomach 30-60 minutes before meal 30 capsule 11    sertraline (ZOLOFT) 50 MG tablet TAKE 1 TABLET(50 MG) BY MOUTH EVERY DAY 30 tablet 2    traZODone (DESYREL) 50 MG tablet TAKE 1/2 TABLET(25 MG) BY MOUTH EVERY EVENING 45 tablet 3    clindamycin-benzoyl peroxide gel AAA face qam to bid (Patient not taking: Reported on 12/25/2019) 45 g 3    fluticasone (FLONASE) 50 mcg/actuation nasal spray 2 sprays (100 mcg total) by Each Nare route once daily. (Patient not taking: Reported on 12/25/2019) 16 g 5    ranitidine (ZANTAC) 300 MG tablet Take 1 tablet (300 mg total) by mouth every evening. (Patient not taking: Reported on 6/1/2020) 30 tablet 11    tretinoin (RETIN-A) 0.025 % cream Apply topically every evening. (Patient not taking: Reported on 12/25/2019) 20 g 3     No current facility-administered medications on file prior to visit.        REVIEW OF SYSTEMS:  Review of Systems   Constitutional: Negative for diaphoresis and fever.   HENT: Negative for ear pain, hearing loss, nosebleeds and tinnitus.    Eyes: Negative for pain and redness.   Respiratory: Negative for cough and shortness of breath.    Cardiovascular: Negative for  "chest pain and palpitations.   Gastrointestinal: Negative for blood in stool, constipation, diarrhea, nausea and vomiting.   Genitourinary: Negative for dysuria, frequency and hematuria.   Musculoskeletal: Negative for back pain and myalgias.   Skin: Negative for itching and rash.   Neurological: Negative for dizziness, tingling, seizures, weakness and headaches.   Endo/Heme/Allergies: Negative for environmental allergies.   Psychiatric/Behavioral: The patient is not nervous/anxious.        GENERAL PHYSICAL EXAM:   Ht 5' 5" (1.651 m)   Wt 86.5 kg (190 lb 11.2 oz)   BMI 31.73 kg/m²    GEN: well developed, well nourished, no acute distress   HENT: Normocephalic, atraumatic   EYES: No discharge, conjunctiva normal   NECK: Supple, non-tender   PULM: No wheezing, no respiratory distress   CV: RRR   ABD: Soft, non-tender    ORTHO EXAM:   Examination of bilateral hands and wrist reveals that there is no edema.  There are no skin changes.  Palpation produces exquisite tenderness overlying the region of the radial artery and FCR tendon on the right wrist.  There is a small firm mass palpated in that area at the level of the radius.  This is nonmobile.  It does produce a shocking type sensation upper forearm.  It is nonpulsatile.  She does report intact sensation in the median radial ulnar distribution.  She is able make a full composite fist and fully extend the fingers.  Capillary refill is less than 2 sec in all the digits.  There are no masses or other pathology palpated about the left hand.  She is also neurovascularly intact.    RADIOLOGY:   X-rays of the right wrist were taken in clinic today.  The films reviewed by me.  There are no fractures dislocations or bony masses noted.  There are no soft tissue masses noted.    ASSESSMENT:   Right wrist mass/ganglion    PLAN:  1.  I have discussed treatment options.  The patient states that she would like to continue to monitor at this point.  If she has any increasing size " or worsening of pain she will follow-up with me immediately at which point I will consider an MRI versus surgical excision.    2.  Patient will follow up with me on a p.r.n. basis    Answers for HPI/ROS submitted by the patient on 6/1/2020   Hand injury  unexpected weight change: No  appetite change : No  sleep disturbance: No  IMMUNOCOMPROMISED: No  dysphoric mood: No  visual disturbance: No  sinus pressure : No  food allergies: No  difficulty urinating: No  painful intercourse: No  numbness: No  joint swelling: No  Pain Chronicity: new  History of trauma: No  Onset: more than 1 year ago  Frequency: intermittently  Progression since onset: gradually worsening  Injury mechanism: lifting  injury location: at home  pain- numeric: 3/10  pain location: left wrist, right wrist  pain quality: shooting  Radiating Pain: No  Aggravating factors: activity  inability to bear weight: Yes  joint locking: No  limited range of motion: No  stiffness: No  Treatments tried: nothing  physical therapy: not tried  Improvement on treatment: no relief

## 2020-06-01 NOTE — LETTER
June 2, 2020      Bessy Guerrero MD  [address] [address] [address]           [facility]  [address] [place] [address]  Phone: [phone]          Patient: Christel Alejo   MR Number: [id number]   YOB: 1985   Date of Visit: 6/1/2020       Dear Dr. Bessy Guerrero:    Thank you for referring Christel Alejo to me for evaluation. Attached you will find relevant portions of my assessment and plan of care.    If you have questions, please do not hesitate to call me. I look forward to following Christel Alejo along with you.    Sincerely,    Mane Thrasher MD    Enclosure  CC:  No Recipients    If you would like to receive this communication electronically, please contact [email or web address] or [phone] to request more information on [facility] access.    For providers and/or their staff who would like to refer a patient to [facility], please contact us through our one-stop-shop provider referral line, [facility], at [phone].    If you feel you have received this communication in error or would no longer like to receive these types of communications, please e-mail [email or web address]

## 2020-07-14 RX ORDER — ALPRAZOLAM 0.5 MG/1
0.5 TABLET ORAL DAILY PRN
Qty: 30 TABLET | Refills: 0 | Status: SHIPPED | OUTPATIENT
Start: 2020-07-14 | End: 2020-08-13

## 2020-08-03 ENCOUNTER — OFFICE VISIT (OUTPATIENT)
Dept: OTOLARYNGOLOGY | Facility: CLINIC | Age: 35
End: 2020-08-03
Payer: COMMERCIAL

## 2020-08-03 VITALS — BODY MASS INDEX: 30.71 KG/M2 | WEIGHT: 184.31 LBS | HEIGHT: 65 IN

## 2020-08-03 DIAGNOSIS — K21.9 LARYNGOPHARYNGEAL REFLUX (LPR): Primary | ICD-10-CM

## 2020-08-03 DIAGNOSIS — S03.40XA SPRAIN OF TEMPOROMANDIBULAR JOINT, INITIAL ENCOUNTER: ICD-10-CM

## 2020-08-03 PROCEDURE — 3008F PR BODY MASS INDEX (BMI) DOCUMENTED: ICD-10-PCS | Mod: CPTII,S$GLB,, | Performed by: OTOLARYNGOLOGY

## 2020-08-03 PROCEDURE — 99213 PR OFFICE/OUTPT VISIT, EST, LEVL III, 20-29 MIN: ICD-10-PCS | Mod: S$GLB,,, | Performed by: OTOLARYNGOLOGY

## 2020-08-03 PROCEDURE — 99213 OFFICE O/P EST LOW 20 MIN: CPT | Mod: S$GLB,,, | Performed by: OTOLARYNGOLOGY

## 2020-08-03 PROCEDURE — 3008F BODY MASS INDEX DOCD: CPT | Mod: CPTII,S$GLB,, | Performed by: OTOLARYNGOLOGY

## 2020-08-03 PROCEDURE — 99999 PR PBB SHADOW E&M-EST. PATIENT-LVL IV: ICD-10-PCS | Mod: PBBFAC,,, | Performed by: OTOLARYNGOLOGY

## 2020-08-03 PROCEDURE — 99999 PR PBB SHADOW E&M-EST. PATIENT-LVL IV: CPT | Mod: PBBFAC,,, | Performed by: OTOLARYNGOLOGY

## 2020-08-03 NOTE — PATIENT INSTRUCTIONS
"Gaviscon    Gaviscon is made up of sodium alginate. This substance, when swallowed, forms a barrier or "raft" on the surface of the stomach contents to prevent reflux of stomach contents into the esophagus or throat. It can be an effective way to manage heartburn or gastroesophageal reflux (GERD.) The benefit is also that it is minimally absorbed into the rest of the body, so you do not have to worry about side effects outside of the stomach. This can be taken long term without any issues as long as you tolerate it well.     Two types of Gaviscon are available:  Gaviscon Original can be purchased through most pharmacies or online.  Gavison Advance is twice as strong as the Original (without side effects), but can only be purchased online. Most easily accessible through [employer]. I find the Advance works better than Original, so try to obtain this if you can.     How to take  Read the instructions to confirm dose, but generally it is 10 mL taken 3 times daily near meals.       Mucous (Post nasal drip) Management    A fullness sensation in the back of the throat is called "globus."   Many people attribute this fullness to a sensation of increased mucous, because they can feel a sticky material in the throat that they will occasionally cough up.    Nasal  / Throat Mucous  Our body NORMALLY makes 1 liter or more of saliva (spit) and nasal mucous every day. These body fluids are important for breaking down the food we eat, protecting our teeth from cavities, and clearing out the pollen and irritants that we breathe in our nose. As our body makes these fluids, we swallow them throughout the day, where they are recycled back through the body. This process is happening all our life without us thinking about it. When an adjustment to this process causes the mucous to be increased or thicker, is when we notice it.     Some problems cause an INCREASE in these body fluids, which we perceive as irritating, excess phlegm in the " throat.   However, it is not always an increase. Many times there is a change in CONSISTENCY of the mucous to make it thicker. This will cause the mucous to be held up in the throat instead of being swallowed easily.    Several factors can cause these problems:  1. Dryness of throat and nose which increases the mucous sticking - Causes include inadequate hydration, excess caffeine / soda / sugary drinks, medication side effects.  2. Acid reflux  3. Increased mucous production from allergies or chronic sinus drainage.     We will evaluate the cause(s) of increased or thickened mucous and consider different treatment strategies:    MANY COMMON medications cause dryness of the throat, including medications for allergy, depression/anxiety, heart, and urination issues.   There is some evidence that added sugars or processed sugars in the diet (not the kind that occur naturally in honey or ripe fruit) can increase mucus, as well as too much dairy. To avoid these refined carbohydrates, on food labels, watch out for wheat flour (also called white, refined or enriched flour) on the ingredients list.     Recommendations for Increased Mucous Sensation    1. Water, water, water - this cannot be understated. Most people are not drinking enough water regularly to keep up with body's demand. Recommend AT LEAST 64 oz of water per day, and more for men (up to 100 oz.) unless a medical issue prevents this.  2. Reduce intake of caffeine / tea / sugary drinks or soda, which all will cause increased mucous.  3. If your nose is the source of mucous, nasal medications discussed by your doctor as well as nasal saline can be effective to wash away the mucous.  4. Prevention of acid reflux by avoiding late-night eating (nothing 3 hours before laying down at night), greasy and spicy foods, alcohol, and acidic foods  5. Humidifier in the bedroom if you find dryness increases at night.  6. Smoking cessation if you use  tobacco  7. Examination your medication list with your doctor to determine medications that may be contributing    There are NUMEROUS over the counter mucous thinning agents. Here are some suggestions that can be purchased online:  1. Sotelo's Breezer's (Sugar Free), Gredesmond's black currant pastilles, and Entertainer's Secret Throat Relief can all help dry mouth and thickened mucous.   2. Biotene spray and mouthwash can help lubricate a dry mouth  3. Mucinex can be helpful in some cases, but stop taking if you don't notice improvement after a few weeks.

## 2020-08-03 NOTE — PROGRESS NOTES
Subjective:       Patient ID: Christel Alejo is a 34 y.o. female.    Chief Complaint: tonsil stone, jaw popping, and Constant throat clearing    Christel is here for follow-up of throat clearing. This has been present for years, but consistent over this time.     She was seen 1 yr ago and treated for LPR, did not improve with PPI. She does feel it has something to do with reflux, because symptom will worse with acidic meals and when she sleeps flat. Does improve with Tums. She does take Zyrtec nightly.   She did have reflux as a child. No EGD recently. She denies voice change or dysphagia.    She also has concerns about left sided jaw popping for 6 months. Mild pain when she does open her mouth.     Other meds include Welbutrin and Zoloft. She reports good water intake.   She has a PSG ordered for sleep issues but did not complete.     Review of Systems   Constitutional: Negative for activity change and appetite change.   Respiratory: Negative for difficulty breathing and wheezing   Cardiovascular: Negative for chest pain.      Objective:        Constitutional:   Vital signs are normal. She appears well-developed and well-nourished.     Head:  Normocephalic and atraumatic. Head is with TMJ tenderness (palpable crepitus).     Ears:  Hearing normal to normal and whispered voice; external ear normal without scars, lesions, or masses; ear canal, tympanic membrane, and middle ear normal..     Nose:  Nose normal including turbinates, nasal mucosa, sinuses and nasal septum.     Mouth/Throat  Oropharynx clear and moist without lesions or asymmetry. Tonsils present (normal appearance), +1.    Cords mobile bilaterally.   Mild PCE      Neck:  Neck normal without thyromegaly masses, asymmetry, normal tracheal structure, crepitus, and tenderness.         Tests / Results:  Reviewed scope from [facility] 2019    Assessment:       1. Laryngopharyngeal reflux (LPR)    2. Sprain of temporomandibular joint, initial encounter           Plan:         We discussed LPR and medication side effects are contributing to issue.  Has had improvement with Tums, but I also suggest adding Gaviscon based on her symptoms. We also discussed medication side effects commonly include dryness, which will exacerbate her issues. Instructions were given on reducing this.     Regarding TMJ, I suggest her seeing dentist / oral surgeon if she has continued concerns and they may consider splinting or other management of TMJ issues.    FU as needed

## 2020-08-28 RX ORDER — BUPROPION HYDROCHLORIDE 150 MG/1
TABLET ORAL
Qty: 30 TABLET | Refills: 1 | OUTPATIENT
Start: 2020-08-28

## 2020-11-06 RX ORDER — BUPROPION HYDROCHLORIDE 150 MG/1
150 TABLET ORAL DAILY
Qty: 30 TABLET | Refills: 1 | OUTPATIENT
Start: 2020-11-06

## 2020-11-06 RX ORDER — ALPRAZOLAM 0.5 MG/1
0.5 TABLET ORAL DAILY PRN
Qty: 30 TABLET | Refills: 0 | OUTPATIENT
Start: 2020-11-06 | End: 2020-12-06

## 2020-11-07 RX ORDER — BUPROPION HYDROCHLORIDE 150 MG/1
150 TABLET ORAL DAILY
Qty: 30 TABLET | Refills: 1 | Status: SHIPPED | OUTPATIENT
Start: 2020-11-07 | End: 2021-02-09 | Stop reason: SDUPTHER

## 2021-01-19 DIAGNOSIS — F32.A DEPRESSION, UNSPECIFIED DEPRESSION TYPE: Primary | ICD-10-CM

## 2021-01-23 RX ORDER — SERTRALINE HYDROCHLORIDE 50 MG/1
50 TABLET, FILM COATED ORAL DAILY
Qty: 30 TABLET | Refills: 0 | Status: SHIPPED | OUTPATIENT
Start: 2021-01-23 | End: 2021-02-28

## 2021-02-02 ENCOUNTER — OFFICE VISIT (OUTPATIENT)
Dept: URGENT CARE | Facility: CLINIC | Age: 36
End: 2021-02-02
Payer: COMMERCIAL

## 2021-02-02 VITALS
BODY MASS INDEX: 30.62 KG/M2 | WEIGHT: 184 LBS | RESPIRATION RATE: 16 BRPM | HEART RATE: 70 BPM | SYSTOLIC BLOOD PRESSURE: 120 MMHG | OXYGEN SATURATION: 98 % | TEMPERATURE: 99 F | DIASTOLIC BLOOD PRESSURE: 71 MMHG

## 2021-02-02 DIAGNOSIS — T14.8XXA MUSCLE STRAIN: ICD-10-CM

## 2021-02-02 DIAGNOSIS — M62.838 MUSCLE SPASM: ICD-10-CM

## 2021-02-02 DIAGNOSIS — M89.8X1 PAIN OF LEFT CLAVICLE: Primary | ICD-10-CM

## 2021-02-02 PROCEDURE — 73000 XR CLAVICLE LEFT: ICD-10-PCS | Mod: LT,S$GLB,, | Performed by: RADIOLOGY

## 2021-02-02 PROCEDURE — 99214 OFFICE O/P EST MOD 30 MIN: CPT | Mod: 25,S$GLB,, | Performed by: NURSE PRACTITIONER

## 2021-02-02 PROCEDURE — 73000 X-RAY EXAM OF COLLAR BONE: CPT | Mod: LT,S$GLB,, | Performed by: RADIOLOGY

## 2021-02-02 PROCEDURE — 96372 THER/PROPH/DIAG INJ SC/IM: CPT | Mod: S$GLB,,, | Performed by: NURSE PRACTITIONER

## 2021-02-02 PROCEDURE — 3008F PR BODY MASS INDEX (BMI) DOCUMENTED: ICD-10-PCS | Mod: CPTII,S$GLB,, | Performed by: NURSE PRACTITIONER

## 2021-02-02 PROCEDURE — 96372 PR INJECTION,THERAP/PROPH/DIAG2ST, IM OR SUBCUT: ICD-10-PCS | Mod: S$GLB,,, | Performed by: NURSE PRACTITIONER

## 2021-02-02 PROCEDURE — 99214 PR OFFICE/OUTPT VISIT, EST, LEVL IV, 30-39 MIN: ICD-10-PCS | Mod: 25,S$GLB,, | Performed by: NURSE PRACTITIONER

## 2021-02-02 PROCEDURE — 3008F BODY MASS INDEX DOCD: CPT | Mod: CPTII,S$GLB,, | Performed by: NURSE PRACTITIONER

## 2021-02-02 RX ORDER — KETOROLAC TROMETHAMINE 30 MG/ML
30 INJECTION, SOLUTION INTRAMUSCULAR; INTRAVENOUS
Status: COMPLETED | OUTPATIENT
Start: 2021-02-02 | End: 2021-02-02

## 2021-02-02 RX ORDER — METHOCARBAMOL 500 MG/1
500 TABLET, FILM COATED ORAL 3 TIMES DAILY PRN
Qty: 15 TABLET | Refills: 0 | Status: SHIPPED | OUTPATIENT
Start: 2021-02-02 | End: 2021-02-07

## 2021-02-02 RX ORDER — NAPROXEN 500 MG/1
500 TABLET ORAL 2 TIMES DAILY WITH MEALS
Qty: 14 TABLET | Refills: 0 | Status: SHIPPED | OUTPATIENT
Start: 2021-02-02 | End: 2021-02-09

## 2021-02-02 RX ADMIN — KETOROLAC TROMETHAMINE 30 MG: 30 INJECTION, SOLUTION INTRAMUSCULAR; INTRAVENOUS at 11:02

## 2021-02-09 DIAGNOSIS — F32.A DEPRESSION, UNSPECIFIED DEPRESSION TYPE: Primary | ICD-10-CM

## 2021-02-11 RX ORDER — BUPROPION HYDROCHLORIDE 150 MG/1
150 TABLET ORAL DAILY
Qty: 30 TABLET | Refills: 1 | Status: SHIPPED | OUTPATIENT
Start: 2021-02-11 | End: 2021-03-05

## 2021-02-25 LAB
HPV, HIGH-RISK: NEGATIVE
PAP SMEAR: NORMAL

## 2021-03-05 ENCOUNTER — PATIENT MESSAGE (OUTPATIENT)
Dept: ADMINISTRATIVE | Facility: HOSPITAL | Age: 36
End: 2021-03-05

## 2021-03-05 ENCOUNTER — OFFICE VISIT (OUTPATIENT)
Dept: FAMILY MEDICINE | Facility: CLINIC | Age: 36
End: 2021-03-05
Payer: COMMERCIAL

## 2021-03-05 VITALS
HEART RATE: 89 BPM | OXYGEN SATURATION: 97 % | SYSTOLIC BLOOD PRESSURE: 118 MMHG | HEIGHT: 65 IN | TEMPERATURE: 98 F | WEIGHT: 183 LBS | DIASTOLIC BLOOD PRESSURE: 80 MMHG | RESPIRATION RATE: 14 BRPM | BODY MASS INDEX: 30.49 KG/M2

## 2021-03-05 DIAGNOSIS — Z00.00 ANNUAL PHYSICAL EXAM: Primary | ICD-10-CM

## 2021-03-05 PROCEDURE — 85025 COMPLETE CBC W/AUTO DIFF WBC: CPT | Performed by: FAMILY MEDICINE

## 2021-03-05 PROCEDURE — 3008F PR BODY MASS INDEX (BMI) DOCUMENTED: ICD-10-PCS | Mod: CPTII,S$GLB,, | Performed by: FAMILY MEDICINE

## 2021-03-05 PROCEDURE — 84443 ASSAY THYROID STIM HORMONE: CPT | Performed by: FAMILY MEDICINE

## 2021-03-05 PROCEDURE — 1126F PR PAIN SEVERITY QUANTIFIED, NO PAIN PRESENT: ICD-10-PCS | Mod: S$GLB,,, | Performed by: FAMILY MEDICINE

## 2021-03-05 PROCEDURE — 1126F AMNT PAIN NOTED NONE PRSNT: CPT | Mod: S$GLB,,, | Performed by: FAMILY MEDICINE

## 2021-03-05 PROCEDURE — 99395 PREV VISIT EST AGE 18-39: CPT | Mod: S$GLB,,, | Performed by: FAMILY MEDICINE

## 2021-03-05 PROCEDURE — 86803 HEPATITIS C AB TEST: CPT | Performed by: FAMILY MEDICINE

## 2021-03-05 PROCEDURE — 80061 LIPID PANEL: CPT | Performed by: FAMILY MEDICINE

## 2021-03-05 PROCEDURE — 80053 COMPREHEN METABOLIC PANEL: CPT | Performed by: FAMILY MEDICINE

## 2021-03-05 PROCEDURE — 99395 PR PREVENTIVE VISIT,EST,18-39: ICD-10-PCS | Mod: S$GLB,,, | Performed by: FAMILY MEDICINE

## 2021-03-05 PROCEDURE — 3008F BODY MASS INDEX DOCD: CPT | Mod: CPTII,S$GLB,, | Performed by: FAMILY MEDICINE

## 2021-03-05 RX ORDER — VILAZODONE HYDROCHLORIDE 20 MG/1
1 TABLET ORAL DAILY
COMMUNITY
End: 2021-08-11 | Stop reason: SDUPTHER

## 2021-03-05 RX ORDER — HYDROXYZINE HYDROCHLORIDE 25 MG/1
25 TABLET, FILM COATED ORAL NIGHTLY
Qty: 30 TABLET | Refills: 1 | Status: SHIPPED | OUTPATIENT
Start: 2021-03-05 | End: 2021-08-05

## 2021-03-06 LAB
ALBUMIN SERPL BCP-MCNC: 4.1 G/DL (ref 3.5–5.2)
ALP SERPL-CCNC: 69 U/L (ref 55–135)
ALT SERPL W/O P-5'-P-CCNC: 31 U/L (ref 10–44)
ANION GAP SERPL CALC-SCNC: 11 MMOL/L (ref 8–16)
AST SERPL-CCNC: 22 U/L (ref 10–40)
BASOPHILS # BLD AUTO: 0.02 K/UL (ref 0–0.2)
BASOPHILS NFR BLD: 0.3 % (ref 0–1.9)
BILIRUB SERPL-MCNC: 1.1 MG/DL (ref 0.1–1)
BUN SERPL-MCNC: 10 MG/DL (ref 6–20)
CALCIUM SERPL-MCNC: 9.8 MG/DL (ref 8.7–10.5)
CHLORIDE SERPL-SCNC: 102 MMOL/L (ref 95–110)
CHOLEST SERPL-MCNC: 258 MG/DL (ref 120–199)
CHOLEST/HDLC SERPL: 4.4 {RATIO} (ref 2–5)
CO2 SERPL-SCNC: 22 MMOL/L (ref 23–29)
CREAT SERPL-MCNC: 0.8 MG/DL (ref 0.5–1.4)
DIFFERENTIAL METHOD: ABNORMAL
EOSINOPHIL # BLD AUTO: 0 K/UL (ref 0–0.5)
EOSINOPHIL NFR BLD: 0.6 % (ref 0–8)
ERYTHROCYTE [DISTWIDTH] IN BLOOD BY AUTOMATED COUNT: 12.6 % (ref 11.5–14.5)
EST. GFR  (AFRICAN AMERICAN): >60 ML/MIN/1.73 M^2
EST. GFR  (NON AFRICAN AMERICAN): >60 ML/MIN/1.73 M^2
GLUCOSE SERPL-MCNC: 83 MG/DL (ref 70–110)
HCT VFR BLD AUTO: 43.4 % (ref 37–48.5)
HDLC SERPL-MCNC: 59 MG/DL (ref 40–75)
HDLC SERPL: 22.9 % (ref 20–50)
HGB BLD-MCNC: 13.7 G/DL (ref 12–16)
IMM GRANULOCYTES # BLD AUTO: 0.02 K/UL (ref 0–0.04)
IMM GRANULOCYTES NFR BLD AUTO: 0.3 % (ref 0–0.5)
LDLC SERPL CALC-MCNC: 160 MG/DL (ref 63–159)
LYMPHOCYTES # BLD AUTO: 2.1 K/UL (ref 1–4.8)
LYMPHOCYTES NFR BLD: 31.4 % (ref 18–48)
MCH RBC QN AUTO: 28.6 PG (ref 27–31)
MCHC RBC AUTO-ENTMCNC: 31.6 G/DL (ref 32–36)
MCV RBC AUTO: 91 FL (ref 82–98)
MONOCYTES # BLD AUTO: 0.4 K/UL (ref 0.3–1)
MONOCYTES NFR BLD: 6.4 % (ref 4–15)
NEUTROPHILS # BLD AUTO: 4.1 K/UL (ref 1.8–7.7)
NEUTROPHILS NFR BLD: 61 % (ref 38–73)
NONHDLC SERPL-MCNC: 199 MG/DL
NRBC BLD-RTO: 0 /100 WBC
PLATELET # BLD AUTO: 229 K/UL (ref 150–350)
PMV BLD AUTO: 12.7 FL (ref 9.2–12.9)
POTASSIUM SERPL-SCNC: 4 MMOL/L (ref 3.5–5.1)
PROT SERPL-MCNC: 7.9 G/DL (ref 6–8.4)
RBC # BLD AUTO: 4.79 M/UL (ref 4–5.4)
SODIUM SERPL-SCNC: 135 MMOL/L (ref 136–145)
TRIGL SERPL-MCNC: 195 MG/DL (ref 30–150)
TSH SERPL DL<=0.005 MIU/L-ACNC: 2.63 UIU/ML (ref 0.4–4)
WBC # BLD AUTO: 6.76 K/UL (ref 3.9–12.7)

## 2021-03-07 ENCOUNTER — PATIENT MESSAGE (OUTPATIENT)
Dept: FAMILY MEDICINE | Facility: CLINIC | Age: 36
End: 2021-03-07

## 2021-03-09 LAB — HCV AB SERPL QL IA: NEGATIVE

## 2021-03-10 ENCOUNTER — PATIENT OUTREACH (OUTPATIENT)
Dept: ADMINISTRATIVE | Facility: HOSPITAL | Age: 36
End: 2021-03-10

## 2021-03-24 ENCOUNTER — OFFICE VISIT (OUTPATIENT)
Dept: OPTOMETRY | Facility: CLINIC | Age: 36
End: 2021-03-24
Payer: COMMERCIAL

## 2021-03-24 DIAGNOSIS — Z46.0 FITTING AND ADJUSTMENT OF SPECTACLES AND CONTACT LENSES: Primary | ICD-10-CM

## 2021-03-24 DIAGNOSIS — Z97.3 WEARS CONTACT LENSES: ICD-10-CM

## 2021-03-24 DIAGNOSIS — H52.203 MYOPIA OF BOTH EYES WITH ASTIGMATISM: Primary | ICD-10-CM

## 2021-03-24 DIAGNOSIS — H52.13 MYOPIA OF BOTH EYES WITH ASTIGMATISM: Primary | ICD-10-CM

## 2021-03-24 DIAGNOSIS — Z83.511 FAMILY HISTORY OF GLAUCOMA: ICD-10-CM

## 2021-03-24 PROCEDURE — 92310 CONTACT LENS FITTING OU: CPT | Mod: CSM,,, | Performed by: OPTOMETRIST

## 2021-03-24 PROCEDURE — 92004 PR EYE EXAM, NEW PATIENT,COMPREHESV: ICD-10-PCS | Mod: S$GLB,,, | Performed by: OPTOMETRIST

## 2021-03-24 PROCEDURE — 92310 PR CONTACT LENS FITTING (NO CHANGE): ICD-10-PCS | Mod: CSM,,, | Performed by: OPTOMETRIST

## 2021-03-24 PROCEDURE — 1126F PR PAIN SEVERITY QUANTIFIED, NO PAIN PRESENT: ICD-10-PCS | Mod: S$GLB,,, | Performed by: OPTOMETRIST

## 2021-03-24 PROCEDURE — 99999 PR PBB SHADOW E&M-EST. PATIENT-LVL II: CPT | Mod: PBBFAC,,, | Performed by: OPTOMETRIST

## 2021-03-24 PROCEDURE — 92004 COMPRE OPH EXAM NEW PT 1/>: CPT | Mod: S$GLB,,, | Performed by: OPTOMETRIST

## 2021-03-24 PROCEDURE — 92015 PR REFRACTION: ICD-10-PCS | Mod: S$GLB,,, | Performed by: OPTOMETRIST

## 2021-03-24 PROCEDURE — 92015 DETERMINE REFRACTIVE STATE: CPT | Mod: S$GLB,,, | Performed by: OPTOMETRIST

## 2021-03-24 PROCEDURE — 99999 PR PBB SHADOW E&M-EST. PATIENT-LVL II: ICD-10-PCS | Mod: PBBFAC,,, | Performed by: OPTOMETRIST

## 2021-03-24 PROCEDURE — 99999 PR PBB SHADOW E&M-EST. PATIENT-LVL III: ICD-10-PCS | Mod: PBBFAC,,, | Performed by: OPTOMETRIST

## 2021-03-24 PROCEDURE — 1126F AMNT PAIN NOTED NONE PRSNT: CPT | Mod: S$GLB,,, | Performed by: OPTOMETRIST

## 2021-03-24 PROCEDURE — 99999 PR PBB SHADOW E&M-EST. PATIENT-LVL III: CPT | Mod: PBBFAC,,, | Performed by: OPTOMETRIST

## 2021-03-26 PROBLEM — Z97.3 WEARS CONTACT LENSES: Status: ACTIVE | Noted: 2021-03-26

## 2021-05-12 ENCOUNTER — PATIENT MESSAGE (OUTPATIENT)
Dept: RESEARCH | Facility: HOSPITAL | Age: 36
End: 2021-05-12

## 2021-06-08 ENCOUNTER — PATIENT MESSAGE (OUTPATIENT)
Dept: OPTOMETRY | Facility: CLINIC | Age: 36
End: 2021-06-08

## 2021-06-29 ENCOUNTER — PATIENT MESSAGE (OUTPATIENT)
Dept: OPTOMETRY | Facility: CLINIC | Age: 36
End: 2021-06-29

## 2021-06-30 ENCOUNTER — OFFICE VISIT (OUTPATIENT)
Dept: URGENT CARE | Facility: CLINIC | Age: 36
End: 2021-06-30
Payer: COMMERCIAL

## 2021-06-30 VITALS
WEIGHT: 182 LBS | BODY MASS INDEX: 30.32 KG/M2 | RESPIRATION RATE: 19 BRPM | HEIGHT: 65 IN | HEART RATE: 75 BPM | OXYGEN SATURATION: 99 % | TEMPERATURE: 98 F | DIASTOLIC BLOOD PRESSURE: 73 MMHG | SYSTOLIC BLOOD PRESSURE: 105 MMHG

## 2021-06-30 DIAGNOSIS — U07.1 COVID-19 VIRUS DETECTED: ICD-10-CM

## 2021-06-30 DIAGNOSIS — R52 GENERALIZED BODY ACHES: ICD-10-CM

## 2021-06-30 DIAGNOSIS — U07.1 COVID-19 VIRUS INFECTION: Primary | ICD-10-CM

## 2021-06-30 LAB
CTP QC/QA: YES
SARS-COV-2 RDRP RESP QL NAA+PROBE: POSITIVE

## 2021-06-30 PROCEDURE — 99214 OFFICE O/P EST MOD 30 MIN: CPT | Mod: S$GLB,,, | Performed by: PHYSICIAN ASSISTANT

## 2021-06-30 PROCEDURE — U0002: ICD-10-PCS | Mod: QW,S$GLB,, | Performed by: PHYSICIAN ASSISTANT

## 2021-06-30 PROCEDURE — 3008F BODY MASS INDEX DOCD: CPT | Mod: CPTII,S$GLB,, | Performed by: PHYSICIAN ASSISTANT

## 2021-06-30 PROCEDURE — 3008F PR BODY MASS INDEX (BMI) DOCUMENTED: ICD-10-PCS | Mod: CPTII,S$GLB,, | Performed by: PHYSICIAN ASSISTANT

## 2021-06-30 PROCEDURE — 99214 PR OFFICE/OUTPT VISIT, EST, LEVL IV, 30-39 MIN: ICD-10-PCS | Mod: S$GLB,,, | Performed by: PHYSICIAN ASSISTANT

## 2021-06-30 PROCEDURE — U0002 COVID-19 LAB TEST NON-CDC: HCPCS | Mod: QW,S$GLB,, | Performed by: PHYSICIAN ASSISTANT

## 2021-06-30 RX ORDER — ALBUTEROL SULFATE 90 UG/1
2 AEROSOL, METERED RESPIRATORY (INHALATION) EVERY 6 HOURS PRN
Qty: 18 G | Refills: 1 | Status: SHIPPED | OUTPATIENT
Start: 2021-06-30 | End: 2023-10-05

## 2021-06-30 RX ORDER — LISDEXAMFETAMINE DIMESYLATE 30 MG/1
30 CAPSULE ORAL EVERY MORNING
COMMUNITY
Start: 2021-03-22 | End: 2021-10-07

## 2021-07-01 ENCOUNTER — TELEPHONE (OUTPATIENT)
Dept: FAMILY MEDICINE | Facility: CLINIC | Age: 36
End: 2021-07-01

## 2021-07-01 ENCOUNTER — PATIENT MESSAGE (OUTPATIENT)
Dept: FAMILY MEDICINE | Facility: CLINIC | Age: 36
End: 2021-07-01

## 2021-07-01 DIAGNOSIS — U07.1 COVID-19: Primary | ICD-10-CM

## 2021-07-05 ENCOUNTER — INFUSION (OUTPATIENT)
Dept: INFECTIOUS DISEASES | Facility: HOSPITAL | Age: 36
End: 2021-07-05
Attending: FAMILY MEDICINE
Payer: COMMERCIAL

## 2021-07-05 VITALS
SYSTOLIC BLOOD PRESSURE: 96 MMHG | RESPIRATION RATE: 18 BRPM | WEIGHT: 182 LBS | OXYGEN SATURATION: 96 % | DIASTOLIC BLOOD PRESSURE: 54 MMHG | HEART RATE: 94 BPM | TEMPERATURE: 98 F | HEIGHT: 65 IN | BODY MASS INDEX: 30.32 KG/M2

## 2021-07-05 DIAGNOSIS — U07.1 COVID-19: ICD-10-CM

## 2021-07-05 PROCEDURE — 63600175 PHARM REV CODE 636 W HCPCS: Performed by: FAMILY MEDICINE

## 2021-07-05 PROCEDURE — 25000003 PHARM REV CODE 250: Performed by: FAMILY MEDICINE

## 2021-07-05 PROCEDURE — M0243 CASIRIVI AND IMDEVI INFUSION: HCPCS | Performed by: FAMILY MEDICINE

## 2021-07-05 RX ORDER — ACETAMINOPHEN 325 MG/1
650 TABLET ORAL ONCE AS NEEDED
Status: DISCONTINUED | OUTPATIENT
Start: 2021-07-05 | End: 2023-10-30

## 2021-07-05 RX ORDER — DIPHENHYDRAMINE HYDROCHLORIDE 50 MG/ML
25 INJECTION INTRAMUSCULAR; INTRAVENOUS ONCE AS NEEDED
Status: DISCONTINUED | OUTPATIENT
Start: 2021-07-05 | End: 2021-10-07

## 2021-07-05 RX ORDER — EPINEPHRINE 0.3 MG/.3ML
0.3 INJECTION SUBCUTANEOUS
Status: DISCONTINUED | OUTPATIENT
Start: 2021-07-05 | End: 2021-08-05

## 2021-07-05 RX ORDER — ONDANSETRON 4 MG/1
4 TABLET, ORALLY DISINTEGRATING ORAL ONCE AS NEEDED
Status: DISCONTINUED | OUTPATIENT
Start: 2021-07-05 | End: 2021-08-05

## 2021-07-05 RX ORDER — SODIUM CHLORIDE 0.9 % (FLUSH) 0.9 %
10 SYRINGE (ML) INJECTION
Status: DISCONTINUED | OUTPATIENT
Start: 2021-07-05 | End: 2021-08-05

## 2021-07-05 RX ORDER — ALBUTEROL SULFATE 90 UG/1
2 AEROSOL, METERED RESPIRATORY (INHALATION)
Status: DISCONTINUED | OUTPATIENT
Start: 2021-07-05 | End: 2023-10-05

## 2021-07-05 RX ADMIN — CASIRIVIMAB 600 MG: 1332 INJECTION, SOLUTION, CONCENTRATE INTRAVENOUS at 10:07

## 2021-07-05 RX ADMIN — SODIUM CHLORIDE: 0.9 INJECTION, SOLUTION INTRAVENOUS at 10:07

## 2021-07-06 ENCOUNTER — NURSE TRIAGE (OUTPATIENT)
Dept: ADMINISTRATIVE | Facility: CLINIC | Age: 36
End: 2021-07-06

## 2021-07-27 ENCOUNTER — TELEPHONE (OUTPATIENT)
Dept: FAMILY MEDICINE | Facility: CLINIC | Age: 36
End: 2021-07-27

## 2021-07-27 ENCOUNTER — OFFICE VISIT (OUTPATIENT)
Dept: FAMILY MEDICINE | Facility: CLINIC | Age: 36
End: 2021-07-27
Payer: COMMERCIAL

## 2021-07-27 DIAGNOSIS — U07.1 PNEUMONIA DUE TO COVID-19 VIRUS: Primary | ICD-10-CM

## 2021-07-27 DIAGNOSIS — J12.82 PNEUMONIA DUE TO COVID-19 VIRUS: Primary | ICD-10-CM

## 2021-07-27 PROCEDURE — 1159F PR MEDICATION LIST DOCUMENTED IN MEDICAL RECORD: ICD-10-PCS | Mod: CPTII,,, | Performed by: FAMILY MEDICINE

## 2021-07-27 PROCEDURE — 1160F RVW MEDS BY RX/DR IN RCRD: CPT | Mod: CPTII,,, | Performed by: FAMILY MEDICINE

## 2021-07-27 PROCEDURE — 1160F PR REVIEW ALL MEDS BY PRESCRIBER/CLIN PHARMACIST DOCUMENTED: ICD-10-PCS | Mod: CPTII,,, | Performed by: FAMILY MEDICINE

## 2021-07-27 PROCEDURE — 99213 PR OFFICE/OUTPT VISIT, EST, LEVL III, 20-29 MIN: ICD-10-PCS | Mod: 95,,, | Performed by: FAMILY MEDICINE

## 2021-07-27 PROCEDURE — 1159F MED LIST DOCD IN RCRD: CPT | Mod: CPTII,,, | Performed by: FAMILY MEDICINE

## 2021-07-27 PROCEDURE — 99213 OFFICE O/P EST LOW 20 MIN: CPT | Mod: 95,,, | Performed by: FAMILY MEDICINE

## 2021-07-27 RX ORDER — FLUTICASONE PROPIONATE AND SALMETEROL 250; 50 UG/1; UG/1
1 POWDER RESPIRATORY (INHALATION) 2 TIMES DAILY
Qty: 60 EACH | Refills: 1 | Status: SHIPPED | OUTPATIENT
Start: 2021-07-27 | End: 2021-10-07 | Stop reason: SDUPTHER

## 2021-07-27 RX ORDER — PREDNISONE 20 MG/1
40 TABLET ORAL DAILY
Qty: 20 TABLET | Refills: 0 | Status: SHIPPED | OUTPATIENT
Start: 2021-07-27 | End: 2021-08-06

## 2021-07-28 ENCOUNTER — HOSPITAL ENCOUNTER (OUTPATIENT)
Dept: RADIOLOGY | Facility: HOSPITAL | Age: 36
Discharge: HOME | End: 2021-07-28
Attending: FAMILY MEDICINE
Payer: COMMERCIAL

## 2021-07-28 ENCOUNTER — PATIENT MESSAGE (OUTPATIENT)
Dept: FAMILY MEDICINE | Facility: CLINIC | Age: 36
End: 2021-07-28

## 2021-07-28 DIAGNOSIS — U07.1 PNEUMONIA DUE TO COVID-19 VIRUS: ICD-10-CM

## 2021-07-28 DIAGNOSIS — J12.82 PNEUMONIA DUE TO COVID-19 VIRUS: ICD-10-CM

## 2021-07-28 PROCEDURE — 71046 X-RAY EXAM CHEST 2 VIEWS: CPT | Mod: TC,FY,PO

## 2021-07-28 PROCEDURE — 71046 XR CHEST PA AND LATERAL: ICD-10-PCS | Mod: 26,,, | Performed by: RADIOLOGY

## 2021-07-28 PROCEDURE — 71046 X-RAY EXAM CHEST 2 VIEWS: CPT | Mod: 26,,, | Performed by: RADIOLOGY

## 2021-08-05 ENCOUNTER — OFFICE VISIT (OUTPATIENT)
Dept: FAMILY MEDICINE | Facility: CLINIC | Age: 36
End: 2021-08-05
Payer: COMMERCIAL

## 2021-08-05 VITALS
HEART RATE: 73 BPM | SYSTOLIC BLOOD PRESSURE: 132 MMHG | TEMPERATURE: 98 F | BODY MASS INDEX: 28.45 KG/M2 | OXYGEN SATURATION: 98 % | WEIGHT: 170.94 LBS | DIASTOLIC BLOOD PRESSURE: 80 MMHG | RESPIRATION RATE: 14 BRPM

## 2021-08-05 DIAGNOSIS — F41.9 ANXIETY: ICD-10-CM

## 2021-08-05 DIAGNOSIS — G47.00 INSOMNIA, UNSPECIFIED TYPE: ICD-10-CM

## 2021-08-05 DIAGNOSIS — F98.8 ATTENTION DEFICIT DISORDER, UNSPECIFIED HYPERACTIVITY PRESENCE: ICD-10-CM

## 2021-08-05 DIAGNOSIS — U07.1 COVID-19: Primary | ICD-10-CM

## 2021-08-05 DIAGNOSIS — F32.9 MAJOR DEPRESSIVE DISORDER, REMISSION STATUS UNSPECIFIED, UNSPECIFIED WHETHER RECURRENT: ICD-10-CM

## 2021-08-05 PROCEDURE — 1159F PR MEDICATION LIST DOCUMENTED IN MEDICAL RECORD: ICD-10-PCS | Mod: CPTII,S$GLB,, | Performed by: FAMILY MEDICINE

## 2021-08-05 PROCEDURE — 99214 PR OFFICE/OUTPT VISIT, EST, LEVL IV, 30-39 MIN: ICD-10-PCS | Mod: S$GLB,,, | Performed by: FAMILY MEDICINE

## 2021-08-05 PROCEDURE — 3008F PR BODY MASS INDEX (BMI) DOCUMENTED: ICD-10-PCS | Mod: CPTII,S$GLB,, | Performed by: FAMILY MEDICINE

## 2021-08-05 PROCEDURE — 3079F PR MOST RECENT DIASTOLIC BLOOD PRESSURE 80-89 MM HG: ICD-10-PCS | Mod: CPTII,S$GLB,, | Performed by: FAMILY MEDICINE

## 2021-08-05 PROCEDURE — 3075F PR MOST RECENT SYSTOLIC BLOOD PRESS GE 130-139MM HG: ICD-10-PCS | Mod: CPTII,S$GLB,, | Performed by: FAMILY MEDICINE

## 2021-08-05 PROCEDURE — 3008F BODY MASS INDEX DOCD: CPT | Mod: CPTII,S$GLB,, | Performed by: FAMILY MEDICINE

## 2021-08-05 PROCEDURE — 1160F PR REVIEW ALL MEDS BY PRESCRIBER/CLIN PHARMACIST DOCUMENTED: ICD-10-PCS | Mod: CPTII,S$GLB,, | Performed by: FAMILY MEDICINE

## 2021-08-05 PROCEDURE — 3075F SYST BP GE 130 - 139MM HG: CPT | Mod: CPTII,S$GLB,, | Performed by: FAMILY MEDICINE

## 2021-08-05 PROCEDURE — 99214 OFFICE O/P EST MOD 30 MIN: CPT | Mod: S$GLB,,, | Performed by: FAMILY MEDICINE

## 2021-08-05 PROCEDURE — 3079F DIAST BP 80-89 MM HG: CPT | Mod: CPTII,S$GLB,, | Performed by: FAMILY MEDICINE

## 2021-08-05 PROCEDURE — 1160F RVW MEDS BY RX/DR IN RCRD: CPT | Mod: CPTII,S$GLB,, | Performed by: FAMILY MEDICINE

## 2021-08-05 PROCEDURE — 1159F MED LIST DOCD IN RCRD: CPT | Mod: CPTII,S$GLB,, | Performed by: FAMILY MEDICINE

## 2021-08-05 RX ORDER — ALPRAZOLAM 0.5 MG/1
0.5 TABLET ORAL DAILY PRN
Qty: 45 TABLET | Refills: 0 | Status: SHIPPED | OUTPATIENT
Start: 2021-08-05 | End: 2021-09-02 | Stop reason: SDUPTHER

## 2021-08-05 RX ORDER — ZOLPIDEM TARTRATE 6.25 MG/1
6.25 TABLET, FILM COATED, EXTENDED RELEASE ORAL NIGHTLY
COMMUNITY
Start: 2021-06-16 | End: 2021-10-07

## 2021-08-05 RX ORDER — VILAZODONE HYDROCHLORIDE 20 MG/1
TABLET ORAL
COMMUNITY
End: 2021-08-05

## 2021-08-11 ENCOUNTER — PATIENT MESSAGE (OUTPATIENT)
Dept: FAMILY MEDICINE | Facility: CLINIC | Age: 36
End: 2021-08-11

## 2021-08-11 DIAGNOSIS — F32.A DEPRESSION, UNSPECIFIED DEPRESSION TYPE: Primary | ICD-10-CM

## 2021-08-11 RX ORDER — VILAZODONE HYDROCHLORIDE 20 MG/1
1 TABLET ORAL DAILY
Qty: 30 TABLET | Refills: 0 | Status: SHIPPED | OUTPATIENT
Start: 2021-08-11 | End: 2021-08-26

## 2021-08-26 ENCOUNTER — OFFICE VISIT (OUTPATIENT)
Dept: FAMILY MEDICINE | Facility: CLINIC | Age: 36
End: 2021-08-26
Payer: COMMERCIAL

## 2021-08-26 VITALS
HEIGHT: 65 IN | HEART RATE: 101 BPM | RESPIRATION RATE: 16 BRPM | WEIGHT: 173.5 LBS | BODY MASS INDEX: 28.91 KG/M2 | OXYGEN SATURATION: 98 % | DIASTOLIC BLOOD PRESSURE: 88 MMHG | SYSTOLIC BLOOD PRESSURE: 124 MMHG | TEMPERATURE: 98 F

## 2021-08-26 DIAGNOSIS — U07.1 COVID-19: Primary | ICD-10-CM

## 2021-08-26 DIAGNOSIS — F41.9 ANXIETY: ICD-10-CM

## 2021-08-26 DIAGNOSIS — F32.A DEPRESSION, UNSPECIFIED DEPRESSION TYPE: ICD-10-CM

## 2021-08-26 PROCEDURE — 3008F BODY MASS INDEX DOCD: CPT | Mod: CPTII,S$GLB,, | Performed by: FAMILY MEDICINE

## 2021-08-26 PROCEDURE — 3079F PR MOST RECENT DIASTOLIC BLOOD PRESSURE 80-89 MM HG: ICD-10-PCS | Mod: CPTII,S$GLB,, | Performed by: FAMILY MEDICINE

## 2021-08-26 PROCEDURE — 99214 OFFICE O/P EST MOD 30 MIN: CPT | Mod: S$GLB,,, | Performed by: FAMILY MEDICINE

## 2021-08-26 PROCEDURE — 1159F PR MEDICATION LIST DOCUMENTED IN MEDICAL RECORD: ICD-10-PCS | Mod: CPTII,S$GLB,, | Performed by: FAMILY MEDICINE

## 2021-08-26 PROCEDURE — 1160F PR REVIEW ALL MEDS BY PRESCRIBER/CLIN PHARMACIST DOCUMENTED: ICD-10-PCS | Mod: CPTII,S$GLB,, | Performed by: FAMILY MEDICINE

## 2021-08-26 PROCEDURE — 3079F DIAST BP 80-89 MM HG: CPT | Mod: CPTII,S$GLB,, | Performed by: FAMILY MEDICINE

## 2021-08-26 PROCEDURE — 99214 PR OFFICE/OUTPT VISIT, EST, LEVL IV, 30-39 MIN: ICD-10-PCS | Mod: S$GLB,,, | Performed by: FAMILY MEDICINE

## 2021-08-26 PROCEDURE — 3008F PR BODY MASS INDEX (BMI) DOCUMENTED: ICD-10-PCS | Mod: CPTII,S$GLB,, | Performed by: FAMILY MEDICINE

## 2021-08-26 PROCEDURE — 3074F PR MOST RECENT SYSTOLIC BLOOD PRESSURE < 130 MM HG: ICD-10-PCS | Mod: CPTII,S$GLB,, | Performed by: FAMILY MEDICINE

## 2021-08-26 PROCEDURE — 1160F RVW MEDS BY RX/DR IN RCRD: CPT | Mod: CPTII,S$GLB,, | Performed by: FAMILY MEDICINE

## 2021-08-26 PROCEDURE — 3074F SYST BP LT 130 MM HG: CPT | Mod: CPTII,S$GLB,, | Performed by: FAMILY MEDICINE

## 2021-08-26 PROCEDURE — 1159F MED LIST DOCD IN RCRD: CPT | Mod: CPTII,S$GLB,, | Performed by: FAMILY MEDICINE

## 2021-09-08 RX ORDER — ALPRAZOLAM 0.5 MG/1
0.5 TABLET ORAL DAILY PRN
Qty: 30 TABLET | Refills: 0 | Status: SHIPPED | OUTPATIENT
Start: 2021-09-08 | End: 2021-10-07 | Stop reason: SDUPTHER

## 2021-09-16 ENCOUNTER — PATIENT MESSAGE (OUTPATIENT)
Dept: FAMILY MEDICINE | Facility: CLINIC | Age: 36
End: 2021-09-16

## 2021-09-16 RX ORDER — VILAZODONE HYDROCHLORIDE 20 MG/1
TABLET ORAL
COMMUNITY
Start: 2021-08-11 | End: 2021-09-18

## 2021-09-17 DIAGNOSIS — F32.A DEPRESSION, UNSPECIFIED DEPRESSION TYPE: Primary | ICD-10-CM

## 2021-09-18 RX ORDER — VILAZODONE HYDROCHLORIDE 20 MG/1
20 TABLET ORAL DAILY
OUTPATIENT
Start: 2021-09-18

## 2021-09-18 RX ORDER — VILAZODONE HYDROCHLORIDE 20 MG/1
TABLET ORAL
Qty: 30 TABLET | Refills: 1 | Status: SHIPPED | OUTPATIENT
Start: 2021-09-18 | End: 2021-10-07 | Stop reason: SDUPTHER

## 2021-10-07 ENCOUNTER — OFFICE VISIT (OUTPATIENT)
Dept: FAMILY MEDICINE | Facility: CLINIC | Age: 36
End: 2021-10-07
Payer: COMMERCIAL

## 2021-10-07 VITALS
HEART RATE: 75 BPM | TEMPERATURE: 98 F | OXYGEN SATURATION: 98 % | DIASTOLIC BLOOD PRESSURE: 76 MMHG | BODY MASS INDEX: 29.12 KG/M2 | WEIGHT: 174.81 LBS | SYSTOLIC BLOOD PRESSURE: 124 MMHG | RESPIRATION RATE: 18 BRPM | HEIGHT: 65 IN

## 2021-10-07 DIAGNOSIS — F32.A DEPRESSION, UNSPECIFIED DEPRESSION TYPE: ICD-10-CM

## 2021-10-07 DIAGNOSIS — F41.9 ANXIETY: Primary | ICD-10-CM

## 2021-10-07 DIAGNOSIS — U09.9 POST COVID-19 CONDITION, UNSPECIFIED: ICD-10-CM

## 2021-10-07 DIAGNOSIS — G47.00 INSOMNIA, UNSPECIFIED TYPE: ICD-10-CM

## 2021-10-07 PROCEDURE — 90686 FLU VACCINE (QUAD) GREATER THAN OR EQUAL TO 3YO PRESERVATIVE FREE IM: ICD-10-PCS | Mod: S$GLB,,, | Performed by: FAMILY MEDICINE

## 2021-10-07 PROCEDURE — 90471 FLU VACCINE (QUAD) GREATER THAN OR EQUAL TO 3YO PRESERVATIVE FREE IM: ICD-10-PCS | Mod: S$GLB,,, | Performed by: FAMILY MEDICINE

## 2021-10-07 PROCEDURE — 1160F PR REVIEW ALL MEDS BY PRESCRIBER/CLIN PHARMACIST DOCUMENTED: ICD-10-PCS | Mod: CPTII,S$GLB,, | Performed by: FAMILY MEDICINE

## 2021-10-07 PROCEDURE — 3078F PR MOST RECENT DIASTOLIC BLOOD PRESSURE < 80 MM HG: ICD-10-PCS | Mod: CPTII,S$GLB,, | Performed by: FAMILY MEDICINE

## 2021-10-07 PROCEDURE — 1159F MED LIST DOCD IN RCRD: CPT | Mod: CPTII,S$GLB,, | Performed by: FAMILY MEDICINE

## 2021-10-07 PROCEDURE — 3074F PR MOST RECENT SYSTOLIC BLOOD PRESSURE < 130 MM HG: ICD-10-PCS | Mod: CPTII,S$GLB,, | Performed by: FAMILY MEDICINE

## 2021-10-07 PROCEDURE — 3008F BODY MASS INDEX DOCD: CPT | Mod: CPTII,S$GLB,, | Performed by: FAMILY MEDICINE

## 2021-10-07 PROCEDURE — 3078F DIAST BP <80 MM HG: CPT | Mod: CPTII,S$GLB,, | Performed by: FAMILY MEDICINE

## 2021-10-07 PROCEDURE — 90471 IMMUNIZATION ADMIN: CPT | Mod: S$GLB,,, | Performed by: FAMILY MEDICINE

## 2021-10-07 PROCEDURE — 1159F PR MEDICATION LIST DOCUMENTED IN MEDICAL RECORD: ICD-10-PCS | Mod: CPTII,S$GLB,, | Performed by: FAMILY MEDICINE

## 2021-10-07 PROCEDURE — 90686 IIV4 VACC NO PRSV 0.5 ML IM: CPT | Mod: S$GLB,,, | Performed by: FAMILY MEDICINE

## 2021-10-07 PROCEDURE — 99214 PR OFFICE/OUTPT VISIT, EST, LEVL IV, 30-39 MIN: ICD-10-PCS | Mod: 25,S$GLB,, | Performed by: FAMILY MEDICINE

## 2021-10-07 PROCEDURE — 3074F SYST BP LT 130 MM HG: CPT | Mod: CPTII,S$GLB,, | Performed by: FAMILY MEDICINE

## 2021-10-07 PROCEDURE — 1160F RVW MEDS BY RX/DR IN RCRD: CPT | Mod: CPTII,S$GLB,, | Performed by: FAMILY MEDICINE

## 2021-10-07 PROCEDURE — 3008F PR BODY MASS INDEX (BMI) DOCUMENTED: ICD-10-PCS | Mod: CPTII,S$GLB,, | Performed by: FAMILY MEDICINE

## 2021-10-07 PROCEDURE — 99214 OFFICE O/P EST MOD 30 MIN: CPT | Mod: 25,S$GLB,, | Performed by: FAMILY MEDICINE

## 2021-10-07 RX ORDER — ALPRAZOLAM 0.5 MG/1
0.5 TABLET ORAL DAILY PRN
Qty: 30 TABLET | Refills: 0 | Status: SHIPPED | OUTPATIENT
Start: 2021-10-07 | End: 2022-01-14 | Stop reason: SDUPTHER

## 2021-10-07 RX ORDER — VILAZODONE HYDROCHLORIDE 20 MG/1
20 TABLET ORAL DAILY
Qty: 30 TABLET | Refills: 1 | Status: SHIPPED | OUTPATIENT
Start: 2021-10-07 | End: 2022-01-14 | Stop reason: SDUPTHER

## 2021-10-07 RX ORDER — HYDROXYZINE HYDROCHLORIDE 25 MG/1
25 TABLET, FILM COATED ORAL NIGHTLY PRN
Qty: 30 TABLET | Refills: 3 | Status: SHIPPED | OUTPATIENT
Start: 2021-10-07 | End: 2022-03-11 | Stop reason: SDUPTHER

## 2021-10-07 RX ORDER — FLUTICASONE PROPIONATE AND SALMETEROL 250; 50 UG/1; UG/1
1 POWDER RESPIRATORY (INHALATION) 2 TIMES DAILY
Qty: 60 EACH | Refills: 2 | Status: SHIPPED | OUTPATIENT
Start: 2021-10-07 | End: 2023-10-05

## 2021-12-29 ENCOUNTER — PATIENT MESSAGE (OUTPATIENT)
Dept: FAMILY MEDICINE | Facility: CLINIC | Age: 36
End: 2021-12-29
Payer: COMMERCIAL

## 2021-12-29 ENCOUNTER — TELEPHONE (OUTPATIENT)
Dept: FAMILY MEDICINE | Facility: CLINIC | Age: 36
End: 2021-12-29
Payer: COMMERCIAL

## 2022-01-11 ENCOUNTER — OFFICE VISIT (OUTPATIENT)
Dept: FAMILY MEDICINE | Facility: CLINIC | Age: 37
End: 2022-01-11
Payer: COMMERCIAL

## 2022-01-11 VITALS
DIASTOLIC BLOOD PRESSURE: 78 MMHG | BODY MASS INDEX: 28.04 KG/M2 | HEART RATE: 93 BPM | HEIGHT: 65 IN | TEMPERATURE: 99 F | WEIGHT: 168.31 LBS | OXYGEN SATURATION: 98 % | SYSTOLIC BLOOD PRESSURE: 116 MMHG

## 2022-01-11 DIAGNOSIS — F41.9 ANXIETY: ICD-10-CM

## 2022-01-11 DIAGNOSIS — U07.1 COVID: Primary | ICD-10-CM

## 2022-01-11 DIAGNOSIS — R05.9 COUGH: ICD-10-CM

## 2022-01-11 DIAGNOSIS — F32.9 MAJOR DEPRESSIVE DISORDER, REMISSION STATUS UNSPECIFIED, UNSPECIFIED WHETHER RECURRENT: ICD-10-CM

## 2022-01-11 PROCEDURE — 3074F SYST BP LT 130 MM HG: CPT | Mod: CPTII,S$GLB,, | Performed by: FAMILY MEDICINE

## 2022-01-11 PROCEDURE — 3074F PR MOST RECENT SYSTOLIC BLOOD PRESSURE < 130 MM HG: ICD-10-PCS | Mod: CPTII,S$GLB,, | Performed by: FAMILY MEDICINE

## 2022-01-11 PROCEDURE — 99214 PR OFFICE/OUTPT VISIT, EST, LEVL IV, 30-39 MIN: ICD-10-PCS | Mod: S$GLB,,, | Performed by: FAMILY MEDICINE

## 2022-01-11 PROCEDURE — 1160F PR REVIEW ALL MEDS BY PRESCRIBER/CLIN PHARMACIST DOCUMENTED: ICD-10-PCS | Mod: CPTII,S$GLB,, | Performed by: FAMILY MEDICINE

## 2022-01-11 PROCEDURE — U0002 COVID-19 LAB TEST NON-CDC: HCPCS | Mod: QW,S$GLB,, | Performed by: FAMILY MEDICINE

## 2022-01-11 PROCEDURE — 1159F MED LIST DOCD IN RCRD: CPT | Mod: CPTII,S$GLB,, | Performed by: FAMILY MEDICINE

## 2022-01-11 PROCEDURE — 99214 OFFICE O/P EST MOD 30 MIN: CPT | Mod: S$GLB,,, | Performed by: FAMILY MEDICINE

## 2022-01-11 PROCEDURE — 3008F PR BODY MASS INDEX (BMI) DOCUMENTED: ICD-10-PCS | Mod: CPTII,S$GLB,, | Performed by: FAMILY MEDICINE

## 2022-01-11 PROCEDURE — 1159F PR MEDICATION LIST DOCUMENTED IN MEDICAL RECORD: ICD-10-PCS | Mod: CPTII,S$GLB,, | Performed by: FAMILY MEDICINE

## 2022-01-11 PROCEDURE — U0002: ICD-10-PCS | Mod: QW,S$GLB,, | Performed by: FAMILY MEDICINE

## 2022-01-11 PROCEDURE — 3078F DIAST BP <80 MM HG: CPT | Mod: CPTII,S$GLB,, | Performed by: FAMILY MEDICINE

## 2022-01-11 PROCEDURE — 3008F BODY MASS INDEX DOCD: CPT | Mod: CPTII,S$GLB,, | Performed by: FAMILY MEDICINE

## 2022-01-11 PROCEDURE — 1160F RVW MEDS BY RX/DR IN RCRD: CPT | Mod: CPTII,S$GLB,, | Performed by: FAMILY MEDICINE

## 2022-01-11 PROCEDURE — 3078F PR MOST RECENT DIASTOLIC BLOOD PRESSURE < 80 MM HG: ICD-10-PCS | Mod: CPTII,S$GLB,, | Performed by: FAMILY MEDICINE

## 2022-01-12 LAB
CTP QC/QA: YES
SARS-COV-2 RDRP RESP QL NAA+PROBE: POSITIVE

## 2022-01-14 DIAGNOSIS — F32.A DEPRESSION, UNSPECIFIED DEPRESSION TYPE: ICD-10-CM

## 2022-01-14 NOTE — TELEPHONE ENCOUNTER
No new care gaps identified.  Powered by [employer]. Reference number: [id number].   1/14/2022 1:04:16 PM CST

## 2022-01-14 NOTE — TELEPHONE ENCOUNTER
No new care gaps identified.  Powered by [employer]. Reference number: [id number].   1/14/2022 1:11:42 PM CST

## 2022-01-15 RX ORDER — VILAZODONE HYDROCHLORIDE 20 MG/1
20 TABLET ORAL DAILY
Qty: 30 TABLET | Refills: 1 | Status: SHIPPED | OUTPATIENT
Start: 2022-01-15 | End: 2022-01-19

## 2022-01-15 RX ORDER — ALPRAZOLAM 0.5 MG/1
TABLET ORAL
Qty: 30 TABLET | OUTPATIENT
Start: 2022-01-15

## 2022-01-15 RX ORDER — ALPRAZOLAM 0.5 MG/1
0.5 TABLET ORAL DAILY PRN
Qty: 30 TABLET | Refills: 0 | Status: SHIPPED | OUTPATIENT
Start: 2022-01-15 | End: 2022-03-11 | Stop reason: SDUPTHER

## 2022-01-19 DIAGNOSIS — F32.A DEPRESSION, UNSPECIFIED DEPRESSION TYPE: Primary | ICD-10-CM

## 2022-01-19 RX ORDER — VILAZODONE HYDROCHLORIDE 40 MG/1
40 TABLET ORAL DAILY
Qty: 30 TABLET | Refills: 1 | Status: SHIPPED | OUTPATIENT
Start: 2022-01-19 | End: 2022-03-30 | Stop reason: SDUPTHER

## 2022-03-11 NOTE — TELEPHONE ENCOUNTER
No new care gaps identified.  Powered by [employer]. Reference number: [id number].   3/11/2022 7:06:11 AM CST

## 2022-03-12 RX ORDER — ALPRAZOLAM 0.5 MG/1
0.5 TABLET ORAL DAILY PRN
Qty: 30 TABLET | Refills: 0 | Status: SHIPPED | OUTPATIENT
Start: 2022-03-12 | End: 2022-05-03 | Stop reason: SDUPTHER

## 2022-03-12 RX ORDER — HYDROXYZINE HYDROCHLORIDE 25 MG/1
25 TABLET, FILM COATED ORAL NIGHTLY PRN
Qty: 30 TABLET | Refills: 3 | Status: SHIPPED | OUTPATIENT
Start: 2022-03-12 | End: 2022-06-23

## 2022-03-30 DIAGNOSIS — F32.A DEPRESSION, UNSPECIFIED DEPRESSION TYPE: ICD-10-CM

## 2022-03-30 RX ORDER — VILAZODONE HYDROCHLORIDE 40 MG/1
40 TABLET ORAL DAILY
Qty: 30 TABLET | Refills: 1 | Status: SHIPPED | OUTPATIENT
Start: 2022-03-30 | End: 2022-06-10 | Stop reason: SDUPTHER

## 2022-03-30 NOTE — TELEPHONE ENCOUNTER
No new care gaps identified.  Powered by [employer]. Reference number: [id number].   3/30/2022 9:29:42 AM CDT

## 2022-05-04 NOTE — TELEPHONE ENCOUNTER
No new care gaps identified.  [facility] Embedded Care Gaps. Reference number: [id number]. 5/03/2022   7:19:43 PM CDT

## 2022-05-05 RX ORDER — ALPRAZOLAM 0.5 MG/1
0.5 TABLET ORAL DAILY PRN
Qty: 30 TABLET | Refills: 0 | Status: SHIPPED | OUTPATIENT
Start: 2022-05-05 | End: 2022-06-23 | Stop reason: SDUPTHER

## 2022-06-09 DIAGNOSIS — F32.A DEPRESSION, UNSPECIFIED DEPRESSION TYPE: ICD-10-CM

## 2022-06-09 NOTE — TELEPHONE ENCOUNTER
No new care gaps identified.  [facility] Embedded Care Gaps. Reference number: [id number]. 6/09/2022   8:10:04 AM ROULAT

## 2022-06-10 DIAGNOSIS — F32.A DEPRESSION, UNSPECIFIED DEPRESSION TYPE: ICD-10-CM

## 2022-06-10 RX ORDER — VILAZODONE HYDROCHLORIDE 40 MG/1
40 TABLET ORAL DAILY
Qty: 30 TABLET | Refills: 1 | OUTPATIENT
Start: 2022-06-10 | End: 2023-06-10

## 2022-06-10 RX ORDER — ALPRAZOLAM 0.5 MG/1
0.5 TABLET ORAL DAILY PRN
Qty: 30 TABLET | Refills: 0 | OUTPATIENT
Start: 2022-06-10 | End: 2022-07-10

## 2022-06-10 RX ORDER — ALPRAZOLAM 0.5 MG/1
0.5 TABLET ORAL DAILY PRN
Qty: 30 TABLET | Refills: 0 | Status: CANCELLED | OUTPATIENT
Start: 2022-06-10 | End: 2022-07-10

## 2022-06-10 NOTE — TELEPHONE ENCOUNTER
No new care gaps identified.  [facility] Embedded Care Gaps. Reference number: [id number]. 6/10/2022   8:55:47 AM CDT

## 2022-06-14 RX ORDER — VILAZODONE HYDROCHLORIDE 40 MG/1
40 TABLET ORAL DAILY
Qty: 30 TABLET | Refills: 1 | Status: SHIPPED | OUTPATIENT
Start: 2022-06-14 | End: 2022-07-25 | Stop reason: SDUPTHER

## 2022-06-20 ENCOUNTER — PATIENT MESSAGE (OUTPATIENT)
Dept: FAMILY MEDICINE | Facility: CLINIC | Age: 37
End: 2022-06-20
Payer: COMMERCIAL

## 2022-06-21 NOTE — TELEPHONE ENCOUNTER
Do you need to see her in person? You have an open VV this Friday 6/24 at 11:30. Ok to offer if this works for patient?

## 2022-06-22 ENCOUNTER — TELEPHONE (OUTPATIENT)
Dept: FAMILY MEDICINE | Facility: CLINIC | Age: 37
End: 2022-06-22
Payer: COMMERCIAL

## 2022-06-23 ENCOUNTER — OFFICE VISIT (OUTPATIENT)
Dept: FAMILY MEDICINE | Facility: CLINIC | Age: 37
End: 2022-06-23
Payer: COMMERCIAL

## 2022-06-23 ENCOUNTER — TELEPHONE (OUTPATIENT)
Dept: FAMILY MEDICINE | Facility: CLINIC | Age: 37
End: 2022-06-23

## 2022-06-23 VITALS
SYSTOLIC BLOOD PRESSURE: 122 MMHG | BODY MASS INDEX: 28.86 KG/M2 | OXYGEN SATURATION: 96 % | HEIGHT: 65 IN | RESPIRATION RATE: 20 BRPM | WEIGHT: 173.19 LBS | TEMPERATURE: 98 F | HEART RATE: 97 BPM | DIASTOLIC BLOOD PRESSURE: 68 MMHG

## 2022-06-23 DIAGNOSIS — F32.A DEPRESSION, UNSPECIFIED DEPRESSION TYPE: ICD-10-CM

## 2022-06-23 DIAGNOSIS — G47.00 INSOMNIA, UNSPECIFIED TYPE: ICD-10-CM

## 2022-06-23 DIAGNOSIS — F41.1 GAD (GENERALIZED ANXIETY DISORDER): Primary | ICD-10-CM

## 2022-06-23 PROCEDURE — 3074F PR MOST RECENT SYSTOLIC BLOOD PRESSURE < 130 MM HG: ICD-10-PCS | Mod: CPTII,S$GLB,, | Performed by: FAMILY MEDICINE

## 2022-06-23 PROCEDURE — 3074F SYST BP LT 130 MM HG: CPT | Mod: CPTII,S$GLB,, | Performed by: FAMILY MEDICINE

## 2022-06-23 PROCEDURE — 3008F PR BODY MASS INDEX (BMI) DOCUMENTED: ICD-10-PCS | Mod: CPTII,S$GLB,, | Performed by: FAMILY MEDICINE

## 2022-06-23 PROCEDURE — 99214 OFFICE O/P EST MOD 30 MIN: CPT | Mod: S$GLB,,, | Performed by: FAMILY MEDICINE

## 2022-06-23 PROCEDURE — 1160F RVW MEDS BY RX/DR IN RCRD: CPT | Mod: CPTII,S$GLB,, | Performed by: FAMILY MEDICINE

## 2022-06-23 PROCEDURE — 3078F DIAST BP <80 MM HG: CPT | Mod: CPTII,S$GLB,, | Performed by: FAMILY MEDICINE

## 2022-06-23 PROCEDURE — 1159F PR MEDICATION LIST DOCUMENTED IN MEDICAL RECORD: ICD-10-PCS | Mod: CPTII,S$GLB,, | Performed by: FAMILY MEDICINE

## 2022-06-23 PROCEDURE — 99214 PR OFFICE/OUTPT VISIT, EST, LEVL IV, 30-39 MIN: ICD-10-PCS | Mod: S$GLB,,, | Performed by: FAMILY MEDICINE

## 2022-06-23 PROCEDURE — 3008F BODY MASS INDEX DOCD: CPT | Mod: CPTII,S$GLB,, | Performed by: FAMILY MEDICINE

## 2022-06-23 PROCEDURE — 1159F MED LIST DOCD IN RCRD: CPT | Mod: CPTII,S$GLB,, | Performed by: FAMILY MEDICINE

## 2022-06-23 PROCEDURE — 3078F PR MOST RECENT DIASTOLIC BLOOD PRESSURE < 80 MM HG: ICD-10-PCS | Mod: CPTII,S$GLB,, | Performed by: FAMILY MEDICINE

## 2022-06-23 PROCEDURE — 1160F PR REVIEW ALL MEDS BY PRESCRIBER/CLIN PHARMACIST DOCUMENTED: ICD-10-PCS | Mod: CPTII,S$GLB,, | Performed by: FAMILY MEDICINE

## 2022-06-23 RX ORDER — MOXIFLOXACIN 5 MG/ML
1 SOLUTION/ DROPS OPHTHALMIC 4 TIMES DAILY
COMMUNITY
Start: 2022-05-02 | End: 2023-10-05

## 2022-06-23 RX ORDER — ALPRAZOLAM 0.5 MG/1
0.5 TABLET ORAL DAILY PRN
Qty: 30 TABLET | Refills: 0 | Status: SHIPPED | OUTPATIENT
Start: 2022-06-23 | End: 2022-09-06 | Stop reason: SDUPTHER

## 2022-06-23 RX ORDER — PREDNISOLONE ACETATE 10 MG/ML
1 SUSPENSION/ DROPS OPHTHALMIC 4 TIMES DAILY
COMMUNITY
Start: 2022-04-28 | End: 2023-10-05

## 2022-06-23 RX ORDER — HYDROXYZINE HYDROCHLORIDE 50 MG/1
50 TABLET, FILM COATED ORAL NIGHTLY PRN
Qty: 30 TABLET | Refills: 4 | Status: SHIPPED | OUTPATIENT
Start: 2022-06-23 | End: 2022-11-23 | Stop reason: SDUPTHER

## 2022-06-23 RX ORDER — BUSPIRONE HYDROCHLORIDE 10 MG/1
10 TABLET ORAL 3 TIMES DAILY
Qty: 90 TABLET | Refills: 0 | Status: SHIPPED | OUTPATIENT
Start: 2022-06-23 | End: 2023-10-05

## 2022-06-23 NOTE — PATIENT INSTRUCTIONS
Sleep Hygiene Tips     1. Maintain a regular wake time, even on days off work and on weekends.     2. Try to go to bed only when you are drowsy.     3. If you aren't drowsy and are unable to fall asleep for about 20 minutes, leave your bedroom and engage in a quiet activity elsewhere. Do not permit yourself to fall asleep outside the bedroom. Return to bed when, and only when, you are sleepy.     4. Use your bedroom only for sleep, intimacy and times of illness.     5. Almost everyone experiences an occasional night of lost or disturbed sleep. It is a natural, perhaps adaptive, response to acute stress.     6. Avoid napping during the daytime. If you nap, try to do so the same time every day and for no more than one hour.     7. Establish relaxing pre-sleep rituals such as a warm bath, light bedtime snack or 10 minutes of reading.     8. Exercise regularly and confine vigorous exercise to early hours, at least six hours before bedtime, and mild exercise to at least four hours prior to bedtime.     9. Keep a regular schedule. Regular time for meals, medications, chores and other activities help keep the inner clock running smoothly.     10. Hunger may disturb sleep. A light snack, especially warm milk, seems to help people get to sleep. Avoid large meals just prior to bedtime.     11. Avoid ingestion of caffeine within six hours of bedtime, this includes coffee, tea and soda.     12. Don't drink alcohol when sleepy. Even a small dose of alcohol can have a potent effect when combined with tiredness.     13. Avoid the use of nicotine close to bedtime or during the night.     14. Do not drink alcohol while taking sleeping pills or other medication.     15. Occasional loud noises from aircraft, streets or highways disturb sleep even in people who do not awaken and who cannot remember the noise in the morning. These sleep disturbances can reduce restful sleep. People who sleep near noise should try heavy curtains in  their bedrooms, ear plugs or white noise machines to protect the amount of restful sleep they get.     .felicia

## 2022-06-23 NOTE — TELEPHONE ENCOUNTER
Pt requested a sooner appt.  Called patient to schedule.  Patient advised that she is completely out of the Vilazodone and has been off of it for two weeks.  A PA has been submitted, awaiting response.  Pt states that she has also requested a refill of xanax that was denied pending an appt.  Pt is requesting a refill as she has scheduled her annual and does not currently have the other medication.  Please advise.

## 2022-07-13 ENCOUNTER — TELEPHONE (OUTPATIENT)
Dept: FAMILY MEDICINE | Facility: CLINIC | Age: 37
End: 2022-07-13
Payer: COMMERCIAL

## 2022-07-13 NOTE — TELEPHONE ENCOUNTER
Called patient to reschedule her 8/8 appt.  Patient advised that she was just seen recently and does not feel that she needs to be seen at this time.  Appt cancelled per patient request.

## 2022-07-17 NOTE — PROGRESS NOTES
Subjective:       Patient ID: Christel Alejo is a 36 y.o. female.    Chief Complaint: Follow-up    HPI   The patient is a 36-year-old who is here today to discuss her depression and anxiety.  She has recently been out of her viibryd for the past 2 weeks.  She has been out of this because her insurance would not cover the viibryd and she has been waiting for a prior approval request which has been submitted.  She had requested a refill of her Xanax but I told her she needed appointment before the Xanax was refilled.  She does find that the Viibryd works well for her and she had considered trying to stop the viibryd previously because she had been doing so well.  Unfortunately, since she has not been able to fill the viibryd, she can definitely feel the effects of not having it.  She has been having significant anxiety and depression.  Without the viibryd, every little thing is a big thing.  Because of her anxiety and depression levels without the viibryd, she is having trouble functioning at work and unwinding at the end of the day to sleep.  Without the viibryd, she has been using her Xanax a lot more (almost daily) and is out of her Xanax now.  Prior to finding success with the viibryd, she had tried Wellbutrin Zoloft and Paxil without success    She does note that she has not been able to sleep because of her anxiety.  The hydroxyzine 25 mg at night is not working for her.  Previously she tried trazodone but felt groggy the next day.  Previously she has also tried Ambien which caused her to have weird dreams    Review of Systems   Constitutional: Negative for appetite change, chills, diaphoresis, fatigue, fever and unexpected weight change.   HENT: Negative for congestion, ear pain, postnasal drip, rhinorrhea, sinus pressure, sneezing, sore throat and trouble swallowing.    Eyes: Negative for pain, discharge and visual disturbance.   Respiratory: Negative for cough, chest tightness, shortness of breath and  wheezing.    Cardiovascular: Negative for chest pain, palpitations and leg swelling.   Gastrointestinal: Negative for abdominal distention, abdominal pain, blood in stool, constipation, diarrhea, nausea and vomiting.   Skin: Negative for rash.   Psychiatric/Behavioral: Positive for dysphoric mood and sleep disturbance. Negative for self-injury and suicidal ideas. The patient is nervous/anxious.        Objective:      Physical Exam  Constitutional:       General: She is not in acute distress.     Appearance: Normal appearance. She is well-developed.   HENT:      Head: Normocephalic and atraumatic.      Right Ear: Hearing, tympanic membrane, ear canal and external ear normal.      Left Ear: Hearing, tympanic membrane, ear canal and external ear normal.      Nose: Nose normal.      Mouth/Throat:      Mouth: No oral lesions.      Pharynx: No oropharyngeal exudate or posterior oropharyngeal erythema.   Eyes:      General: Lids are normal. No scleral icterus.     Extraocular Movements: Extraocular movements intact.      Conjunctiva/sclera: Conjunctivae normal.      Pupils: Pupils are equal, round, and reactive to light.   Neck:      Thyroid: No thyroid mass or thyromegaly.      Vascular: No carotid bruit.   Cardiovascular:      Rate and Rhythm: Normal rate and regular rhythm.  No extrasystoles are present.     Chest Wall: PMI is not displaced.      Heart sounds: Normal heart sounds. No murmur heard.    No gallop.   Pulmonary:      Effort: Pulmonary effort is normal. No accessory muscle usage or respiratory distress.      Breath sounds: Normal breath sounds.   Chest:   Breasts:      Right: No supraclavicular adenopathy.      Left: No supraclavicular adenopathy.       Abdominal:      General: Bowel sounds are normal. There is no abdominal bruit.      Palpations: Abdomen is soft.      Tenderness: There is no abdominal tenderness. There is no rebound.   Musculoskeletal:      Cervical back: Normal range of motion and neck  "supple.   Lymphadenopathy:      Head:      Right side of head: No submental or submandibular adenopathy.      Left side of head: No submental or submandibular adenopathy.      Cervical:      Right cervical: No superficial, deep or posterior cervical adenopathy.     Left cervical: No superficial, deep or posterior cervical adenopathy.      Upper Body:      Right upper body: No supraclavicular adenopathy.      Left upper body: No supraclavicular adenopathy.   Skin:     General: Skin is warm and dry.   Neurological:      Mental Status: She is alert and oriented to person, place, and time.   Psychiatric:         Attention and Perception: Attention and perception normal.         Mood and Affect: Affect normal. Mood is anxious.         Speech: Speech normal.         Behavior: Behavior normal. Behavior is cooperative.         Thought Content: Thought content normal.         Cognition and Memory: Cognition and memory normal.         Judgment: Judgment normal.       Blood pressure 122/68, pulse 97, temperature 98.4 °F (36.9 °C), resp. rate 20, height 5' 5" (1.651 m), weight 78.5 kg (173 lb 2.7 oz), SpO2 96 %.Body mass index is 28.82 kg/m².          A/P:  1) anxiety and depression.  Uncontrolled.  While we wait for the viibryd to be approved, we are going to start BuSpar 10 mg 3 times a day.  I also gave her a limited supply of Xanax to use very sparingly.  She understands that the Xanax is a benzodiazepine with the potential for addiction and tolerance.  The patient also understands that all benzos impair reflexes and cognition and so the patient should not drive, operate heavy machinery or make significant decisions while taking the benzodiazepine.  The patient should also not take the benzodiazepines with alcohol.  If she is not able to get the viibryd filled within the next week, she will let me know   2)  Insomnia.  Persistent.  We are going to increase the hydroxyzine to 50 mg at night.  If this is not effective, she " will let me know

## 2022-07-25 DIAGNOSIS — F32.A DEPRESSION, UNSPECIFIED DEPRESSION TYPE: ICD-10-CM

## 2022-07-25 RX ORDER — HYDROXYZINE HYDROCHLORIDE 50 MG/1
50 TABLET, FILM COATED ORAL NIGHTLY PRN
Qty: 30 TABLET | Refills: 4 | Status: CANCELLED | OUTPATIENT
Start: 2022-07-25

## 2022-07-25 RX ORDER — VILAZODONE HYDROCHLORIDE 40 MG/1
40 TABLET ORAL DAILY
Qty: 30 TABLET | Refills: 2 | Status: SHIPPED | OUTPATIENT
Start: 2022-07-25 | End: 2022-07-29 | Stop reason: SDUPTHER

## 2022-07-25 NOTE — TELEPHONE ENCOUNTER
No new care gaps identified.  [facility] Embedded Care Gaps. Reference number: [id number]. 7/25/2022   8:12:19 AM CDT

## 2022-07-29 ENCOUNTER — PATIENT MESSAGE (OUTPATIENT)
Dept: FAMILY MEDICINE | Facility: CLINIC | Age: 37
End: 2022-07-29
Payer: COMMERCIAL

## 2022-07-29 ENCOUNTER — TELEPHONE (OUTPATIENT)
Dept: FAMILY MEDICINE | Facility: CLINIC | Age: 37
End: 2022-07-29
Payer: COMMERCIAL

## 2022-07-31 ENCOUNTER — PATIENT MESSAGE (OUTPATIENT)
Dept: FAMILY MEDICINE | Facility: CLINIC | Age: 37
End: 2022-07-31
Payer: COMMERCIAL

## 2022-08-04 ENCOUNTER — PATIENT MESSAGE (OUTPATIENT)
Dept: FAMILY MEDICINE | Facility: CLINIC | Age: 37
End: 2022-08-04
Payer: COMMERCIAL

## 2022-08-04 NOTE — TELEPHONE ENCOUNTER
----- Message from Meli Lyle sent at 8/4/2022  2:56 PM CDT -----  Regarding: [facility]  Name of Who is Calling: KELLEY FLORES [[id number]] Reina Norman ( [facility])      What is the request in detail:  For VIIBRYD patient needs a prior auth with express script or generic brand vilazodone shankar be prescribed for the patient . The patient is out of the medication for a week. Please advise       Can the clinic reply by [facility]: NO      What Number to Call Back if not in [facility]: [phone]        Not applicable

## 2022-08-11 ENCOUNTER — PATIENT MESSAGE (OUTPATIENT)
Dept: FAMILY MEDICINE | Facility: CLINIC | Age: 37
End: 2022-08-11
Payer: COMMERCIAL

## 2022-08-11 RX ORDER — LEVONORGESTREL 52 MG/1
INTRAUTERINE DEVICE INTRAUTERINE
COMMUNITY

## 2022-08-11 RX ORDER — ZOLPIDEM TARTRATE 10 MG/1
10 TABLET ORAL NIGHTLY
COMMUNITY
Start: 2022-04-28 | End: 2023-10-05

## 2022-08-11 NOTE — TELEPHONE ENCOUNTER
PA submitted via [email or web address] for Viibryd 40mg tablet    Key # [id number]     Response received: Drug is covered by current benefit plan. No further PA activity needed

## 2022-10-24 ENCOUNTER — CLINICAL SUPPORT (OUTPATIENT)
Dept: FAMILY MEDICINE | Facility: CLINIC | Age: 37
End: 2022-10-24
Payer: COMMERCIAL

## 2022-10-24 PROCEDURE — 90471 IMMUNIZATION ADMIN: CPT | Mod: S$GLB,,, | Performed by: FAMILY MEDICINE

## 2022-10-24 PROCEDURE — 90471 FLU VACCINE (QUAD) GREATER THAN OR EQUAL TO 3YO PRESERVATIVE FREE IM: ICD-10-PCS | Mod: S$GLB,,, | Performed by: FAMILY MEDICINE

## 2022-10-24 PROCEDURE — 90686 FLU VACCINE (QUAD) GREATER THAN OR EQUAL TO 3YO PRESERVATIVE FREE IM: ICD-10-PCS | Mod: S$GLB,,, | Performed by: FAMILY MEDICINE

## 2022-10-24 PROCEDURE — 90686 IIV4 VACC NO PRSV 0.5 ML IM: CPT | Mod: S$GLB,,, | Performed by: FAMILY MEDICINE

## 2022-10-24 NOTE — PROGRESS NOTES
Pt here for her son's well child checks with Dr. Joseph and she requested to get her flu shot as well.  Pts flu shot given as documented and she tolerated well.

## 2023-09-05 DIAGNOSIS — F32.A DEPRESSION, UNSPECIFIED DEPRESSION TYPE: ICD-10-CM

## 2023-09-05 RX ORDER — VILAZODONE HYDROCHLORIDE 40 MG/1
40 TABLET ORAL
Qty: 90 TABLET | Refills: 0 | Status: SHIPPED | OUTPATIENT
Start: 2023-09-05 | End: 2023-09-15

## 2023-09-05 NOTE — TELEPHONE ENCOUNTER
Refill Decision Note   Christel Alejo  is requesting a refill authorization.  Brief Assessment and Rationale for Refill:  Approve     Medication Therapy Plan:         Comments:     Note composed:6:50 PM 09/05/2023

## 2023-09-05 NOTE — TELEPHONE ENCOUNTER
No care due was identified.  [facility] Embedded Care Due Messages. Reference number: [id number].   9/05/2023 12:39:50 PM CDT

## 2023-09-14 DIAGNOSIS — F32.A DEPRESSION, UNSPECIFIED DEPRESSION TYPE: ICD-10-CM

## 2023-09-14 NOTE — TELEPHONE ENCOUNTER
No care due was identified.  [facility] Embedded Care Due Messages. Reference number: [id number].   9/14/2023 3:01:11 PM CDT

## 2023-09-15 ENCOUNTER — TELEPHONE (OUTPATIENT)
Dept: FAMILY MEDICINE | Facility: CLINIC | Age: 38
End: 2023-09-15
Payer: COMMERCIAL

## 2023-09-15 RX ORDER — VILAZODONE HYDROCHLORIDE 40 MG/1
40 TABLET ORAL
Qty: 90 TABLET | Refills: 0 | Status: SHIPPED | OUTPATIENT
Start: 2023-09-15 | End: 2023-10-18

## 2023-09-15 NOTE — TELEPHONE ENCOUNTER
Refill Routing Note   Medication(s) are not appropriate for processing by [facility] for the following reason(s):      Patient requesting generic    [facility] action(s):  Defer Care Due:  None identified     Medication Therapy Plan: Patient requesting generic      Appointments  past 12m or future 3m with PCP    Date Provider   Last Visit   6/23/2022 Bessy Guerrero MD   Next Visit   Visit date not found Bessy Guerrero MD   ED visits in past 90 days: 0        Note composed:10:24 AM 09/15/2023

## 2023-09-27 ENCOUNTER — PATIENT MESSAGE (OUTPATIENT)
Dept: ADMINISTRATIVE | Facility: HOSPITAL | Age: 38
End: 2023-09-27
Payer: COMMERCIAL

## 2023-09-27 ENCOUNTER — PATIENT OUTREACH (OUTPATIENT)
Dept: ADMINISTRATIVE | Facility: HOSPITAL | Age: 38
End: 2023-09-27
Payer: COMMERCIAL

## 2023-09-27 DIAGNOSIS — Z12.31 OTHER SCREENING MAMMOGRAM: ICD-10-CM

## 2023-09-27 NOTE — PROGRESS NOTES
BREAST CANCER SCREENING    Non-compliant report chart audits for BREAST CANCER SCREENING     Outreach to patient in reference to SCHEDULING A MAMMOGRAM EXAM.     WEEKLY BULK ORDER REPORT.  ORDER PLACED    REVIEWED DIS 09/27/2023

## 2023-10-06 ENCOUNTER — HOSPITAL ENCOUNTER (OUTPATIENT)
Dept: RADIOLOGY | Facility: HOSPITAL | Age: 38
Discharge: HOME | End: 2023-10-06
Attending: INTERNAL MEDICINE
Payer: COMMERCIAL

## 2023-10-06 ENCOUNTER — OFFICE VISIT (OUTPATIENT)
Dept: FAMILY MEDICINE | Facility: CLINIC | Age: 38
End: 2023-10-06
Payer: COMMERCIAL

## 2023-10-06 ENCOUNTER — TELEPHONE (OUTPATIENT)
Dept: RADIOLOGY | Facility: HOSPITAL | Age: 38
End: 2023-10-06

## 2023-10-06 VITALS
HEIGHT: 65 IN | WEIGHT: 190.25 LBS | DIASTOLIC BLOOD PRESSURE: 68 MMHG | SYSTOLIC BLOOD PRESSURE: 104 MMHG | HEART RATE: 84 BPM | OXYGEN SATURATION: 97 % | TEMPERATURE: 98 F | BODY MASS INDEX: 31.7 KG/M2

## 2023-10-06 DIAGNOSIS — F41.1 GAD (GENERALIZED ANXIETY DISORDER): ICD-10-CM

## 2023-10-06 DIAGNOSIS — F33.1 MAJOR DEPRESSIVE DISORDER, RECURRENT EPISODE, MODERATE: ICD-10-CM

## 2023-10-06 DIAGNOSIS — F51.01 PRIMARY INSOMNIA: ICD-10-CM

## 2023-10-06 DIAGNOSIS — Z12.31 ENCOUNTER FOR SCREENING MAMMOGRAM FOR MALIGNANT NEOPLASM OF BREAST: ICD-10-CM

## 2023-10-06 DIAGNOSIS — E66.09 CLASS 1 OBESITY DUE TO EXCESS CALORIES WITH SERIOUS COMORBIDITY AND BODY MASS INDEX (BMI) OF 31.0 TO 31.9 IN ADULT: ICD-10-CM

## 2023-10-06 DIAGNOSIS — Z00.00 WELL ADULT EXAM: Primary | ICD-10-CM

## 2023-10-06 DIAGNOSIS — J30.9 CHRONIC ALLERGIC RHINITIS: ICD-10-CM

## 2023-10-06 DIAGNOSIS — Z12.4 SCREENING FOR CERVICAL CANCER: ICD-10-CM

## 2023-10-06 DIAGNOSIS — L73.9 FOLLICULITIS: ICD-10-CM

## 2023-10-06 PROCEDURE — 77067 MAMMO DIGITAL SCREENING BILAT WITH TOMO: ICD-10-PCS | Mod: 26,,, | Performed by: RADIOLOGY

## 2023-10-06 PROCEDURE — 1160F RVW MEDS BY RX/DR IN RCRD: CPT | Mod: CPTII,S$GLB,, | Performed by: INTERNAL MEDICINE

## 2023-10-06 PROCEDURE — 3078F DIAST BP <80 MM HG: CPT | Mod: CPTII,S$GLB,, | Performed by: INTERNAL MEDICINE

## 2023-10-06 PROCEDURE — 3078F PR MOST RECENT DIASTOLIC BLOOD PRESSURE < 80 MM HG: ICD-10-PCS | Mod: CPTII,S$GLB,, | Performed by: INTERNAL MEDICINE

## 2023-10-06 PROCEDURE — 3074F SYST BP LT 130 MM HG: CPT | Mod: CPTII,S$GLB,, | Performed by: INTERNAL MEDICINE

## 2023-10-06 PROCEDURE — 3044F HG A1C LEVEL LT 7.0%: CPT | Mod: CPTII,S$GLB,, | Performed by: INTERNAL MEDICINE

## 2023-10-06 PROCEDURE — 77063 MAMMO DIGITAL SCREENING BILAT WITH TOMO: ICD-10-PCS | Mod: 26,,, | Performed by: RADIOLOGY

## 2023-10-06 PROCEDURE — 77063 BREAST TOMOSYNTHESIS BI: CPT | Mod: 26,,, | Performed by: RADIOLOGY

## 2023-10-06 PROCEDURE — 1159F MED LIST DOCD IN RCRD: CPT | Mod: CPTII,S$GLB,, | Performed by: INTERNAL MEDICINE

## 2023-10-06 PROCEDURE — 99396 PR PREVENTIVE VISIT,EST,40-64: ICD-10-PCS | Mod: S$GLB,,, | Performed by: INTERNAL MEDICINE

## 2023-10-06 PROCEDURE — 3044F PR MOST RECENT HEMOGLOBIN A1C LEVEL <7.0%: ICD-10-PCS | Mod: CPTII,S$GLB,, | Performed by: INTERNAL MEDICINE

## 2023-10-06 PROCEDURE — 3008F PR BODY MASS INDEX (BMI) DOCUMENTED: ICD-10-PCS | Mod: CPTII,S$GLB,, | Performed by: INTERNAL MEDICINE

## 2023-10-06 PROCEDURE — 99396 PREV VISIT EST AGE 40-64: CPT | Mod: S$GLB,,, | Performed by: INTERNAL MEDICINE

## 2023-10-06 PROCEDURE — 77067 SCR MAMMO BI INCL CAD: CPT | Mod: 26,,, | Performed by: RADIOLOGY

## 2023-10-06 PROCEDURE — 1159F PR MEDICATION LIST DOCUMENTED IN MEDICAL RECORD: ICD-10-PCS | Mod: CPTII,S$GLB,, | Performed by: INTERNAL MEDICINE

## 2023-10-06 PROCEDURE — 3008F BODY MASS INDEX DOCD: CPT | Mod: CPTII,S$GLB,, | Performed by: INTERNAL MEDICINE

## 2023-10-06 PROCEDURE — 77067 SCR MAMMO BI INCL CAD: CPT | Mod: TC,PO

## 2023-10-06 PROCEDURE — 1160F PR REVIEW ALL MEDS BY PRESCRIBER/CLIN PHARMACIST DOCUMENTED: ICD-10-PCS | Mod: CPTII,S$GLB,, | Performed by: INTERNAL MEDICINE

## 2023-10-06 PROCEDURE — 3074F PR MOST RECENT SYSTOLIC BLOOD PRESSURE < 130 MM HG: ICD-10-PCS | Mod: CPTII,S$GLB,, | Performed by: INTERNAL MEDICINE

## 2023-10-06 RX ORDER — SULFAMETHOXAZOLE AND TRIMETHOPRIM 800; 160 MG/1; MG/1
1 TABLET ORAL 2 TIMES DAILY
Qty: 20 TABLET | Refills: 0 | Status: SHIPPED | OUTPATIENT
Start: 2023-10-06 | End: 2023-10-25 | Stop reason: ALTCHOICE

## 2023-10-06 NOTE — PATIENT INSTRUCTIONS
Psychiatric References    [facility]  [phone]   Psychiatry Resources:      [email or web address]   [email or web address]     Another good option is to search:   [email or web address]

## 2023-10-06 NOTE — PROGRESS NOTES
Subjective:       Patient ID: Christel Alejo is a 38 y.o. female.    Medication List with Changes/Refills   New Medications    SULFAMETHOXAZOLE-TRIMETHOPRIM 800-160MG (BACTRIM DS) 800-160 MG TAB    Take 1 tablet by mouth 2 (two) times daily.   Current Medications    ALPRAZOLAM (XANAX) 0.5 MG TABLET    Take 1 tablet (0.5 mg total) by mouth daily as needed for Anxiety.    CETIRIZINE (ZYRTEC) 10 MG TABLET    Take 10 mg by mouth once daily.    HYDROXYZINE (ATARAX) 50 MG TABLET    Take 1 tablet (50 mg total) by mouth nightly as needed (sleep).    INTRAUTERINE DEVICE, IUD, IU    by Intrauterine route.    LEVONORGESTREL (MIRENA) 20 MCG/24 HOURS (7 YRS) 52 MG IUD    Mirena 20 mcg/24 hours (7 yrs) 52 mg intrauterine device   Take by intrauterine route.    VILAZODONE (VIIBRYD) 40 MG TAB TABLET    TAKE 1 TABLET BY MOUTH ONCE DAILY   Discontinued Medications    ALBUTEROL (PROVENTIL HFA) 90 MCG/ACTUATION INHALER    Inhale 2 puffs into the lungs every 6 (six) hours as needed for Wheezing. Rescue    BUSPIRONE (BUSPAR) 10 MG TABLET    Take 1 tablet (10 mg total) by mouth 3 (three) times daily.    FLUTICASONE-SALMETEROL DISKUS INHALER 250-50 MCG    Inhale 1 puff into the lungs 2 (two) times daily. Controller    MOXIFLOXACIN (VIGAMOX) 0.5 % OPHTHALMIC SOLUTION    Place 1 drop into both eyes 4 (four) times daily.    PREDNISOLONE ACETATE (PRED FORTE) 1 % DRPS    Place 1 drop into both eyes 4 (four) times daily.    ZOLPIDEM (AMBIEN) 10 MG TAB    Take 10 mg by mouth nightly.       Chief Complaint: Annual Exam  She is here today for annual exam.     She has chronic allergies that are controlled on zyrtec. She denies any active symptoms.     She has major depression with insomnia. She has difficulty concentrating and focusing.  She has been on many medications in the past but has done best on viibryd 40 mg daily. Over time she feels this medication is not working as well as in the past. She has more depression and anxiety. She  "continues to struggle with sleep despite taking vistaril 50 mg nightly.  She has xanax but does not use. SHe would like to establish with psychiatry to discuss other medications to help with her mood. She feels all these worsened after her covid infection 2 years ago.     She has boils on the back of her thighs that are recurrent and worse after she shaves her legs.  They will get large and drain.  She is using topical antibacterial ointment.     She lives with her  and 2 children. She works as an . She does exercise but does not eat healthy.     Mammogram----none   Pap-----2/2021 neg HPV neg   Tdap---12/2018  Influenza vaccine---10/2022   Covid vaccine---refused     Review of Systems   Constitutional:  Positive for fatigue. Negative for appetite change, fever and unexpected weight change.   HENT:  Negative for congestion, ear pain, hearing loss, sore throat and trouble swallowing.    Eyes:  Negative for pain and visual disturbance.   Respiratory:  Negative for cough, chest tightness, shortness of breath and wheezing.    Cardiovascular:  Negative for chest pain, palpitations and leg swelling.   Gastrointestinal:  Negative for abdominal pain, blood in stool, constipation, diarrhea, nausea and vomiting.   Endocrine: Negative for polyuria.   Genitourinary:  Negative for dysuria and hematuria.   Musculoskeletal:  Positive for arthralgias and back pain. Negative for myalgias.   Skin:  Negative for rash.   Neurological:  Negative for dizziness, weakness, numbness and headaches.   Hematological:  Does not bruise/bleed easily.   Psychiatric/Behavioral:  Positive for dysphoric mood and sleep disturbance. Negative for suicidal ideas. The patient is nervous/anxious.        Objective:      Vitals:    10/06/23 0757   BP: 104/68   BP Location: Right arm   Patient Position: Sitting   BP Method: Medium (Manual)   Pulse: 84   Temp: 98.2 °F (36.8 °C)   SpO2: 97%   Weight: 86.3 kg (190 lb 4.1 oz)   Height: 5' 5" " (1.651 m)     Body mass index is 31.66 kg/m².  Physical Exam    General appearance: No acute distress, cooperative  Eyes: PERRL, EOMI, conjunctiva clear  Ears: normal external ear and pinna, tm clear without drainage, canals clear  Nose: Normal mucosa without drainage  Throat: no exudates or erythema, tonsils not enlarged  Mouth: no sores or lesions, moist mucous membranes  Neck: FROM, soft, supple, no thyromegaly, no bruits  Lymph: no anterior or posterior cervical adenopathy  Heart::  Regular rate and rhythm, no murmur  Lung: Clear to ascultation bilaterally, no wheezing, no rales, no rhonchi, no distress  Abdomen: Soft, nontender, no distention, no hepatosplenomegaly, bowel sounds normal, no guarding, no rebound, no peritoneal signs  Skin: no rashes, multiple erythematous papules on posterior thighs bilateral legs left > right   Extremities: no edema, no cyanosis  Neuro: CN 2-12 intact, 5/5 muscle strength upper and lower extremity bilaterally, 2+ DTRs UE and LE bilaterally, normal gait  Peripheral pulses: 2+ pedal pulses bilaterally, good perfusion and color  Musculoskeletal: FROM, good strenth, no tenderness  Joint: normal appearance, no swelling, no warmth, no deformity in all joints    Assessment:       1. Well adult exam    2. Chronic allergic rhinitis    3. Folliculitis    4. Major depressive disorder, recurrent episode, moderate    5. KAYODE (generalized anxiety disorder)    6. Primary insomnia    7. Class 1 obesity due to excess calories with serious comorbidity and body mass index (BMI) of 31.0 to 31.9 in adult    8. Screening for cervical cancer    9. Encounter for screening mammogram for malignant neoplasm of breast        Plan:       Well adult exam  She is due for labs today. She is due for pap and mammogram. ADvised to get influenza vaccine but she did not want today.   -     CBC Auto Differential; Future; Expected date: 10/06/2023  -     Comprehensive Metabolic Panel; Future; Expected date:  10/06/2023  -     Lipid Panel; Future; Expected date: 10/06/2023  -     TSH; Future; Expected date: 10/06/2023  -     Hemoglobin A1C; Future; Expected date: 10/06/2023  -     HIV 1/2 Ag/Ab (4th Gen); Future; Expected date: 10/06/2023    Chronic allergic rhinitis  Well controlled and continue current regimen.     Folliculitis  Recurrent folliculitis on posterior legs. Advised to stop shaving. Start bactrim for 10 days.   -     sulfamethoxazole-trimethoprim 800-160mg (BACTRIM DS) 800-160 mg Tab; Take 1 tablet by mouth 2 (two) times daily.  Dispense: 20 tablet; Refill: 0    Major depressive disorder, recurrent episode, moderate  Uncontrolled and after long discussion will refer to psychiatry for evaluation. Viibryd has many drug interactions.   -     Ambulatory referral/consult to Psychiatry; Future; Expected date: 10/13/2023    KAYODE (generalized anxiety disorder)  Uncontrolled and she will discuss with psychiatry.   -     Ambulatory referral/consult to Psychiatry; Future; Expected date: 10/13/2023    Primary insomnia  Stable on vistaril.   -     Ambulatory referral/consult to Psychiatry; Future; Expected date: 10/13/2023    Class 1 obesity due to excess calories with serious comorbidity and body mass index (BMI) of 31.0 to 31.9 in adult  Long discussion on the benefits of healthy eating and regular exercise to help lose weight.     Screening for cervical cancer  -     Ambulatory referral/consult to Obstetrics / Gynecology; Future; Expected date: 10/13/2023    Encounter for screening mammogram for malignant neoplasm of breast  -     Mammo Digital Screening Bilat w/ Facundo; Future; Expected date: 10/06/2023    Follow up in about 1 year (around 10/6/2024) for annual exam.

## 2023-10-09 ENCOUNTER — PATIENT MESSAGE (OUTPATIENT)
Dept: FAMILY MEDICINE | Facility: CLINIC | Age: 38
End: 2023-10-09
Payer: COMMERCIAL

## 2023-10-09 ENCOUNTER — PATIENT MESSAGE (OUTPATIENT)
Dept: PSYCHIATRY | Facility: CLINIC | Age: 38
End: 2023-10-09
Payer: COMMERCIAL

## 2023-10-09 DIAGNOSIS — E78.5 HYPERLIPIDEMIA, UNSPECIFIED HYPERLIPIDEMIA TYPE: Primary | ICD-10-CM

## 2023-10-09 RX ORDER — ATORVASTATIN CALCIUM 10 MG/1
10 TABLET, FILM COATED ORAL DAILY
Qty: 90 TABLET | Refills: 3 | Status: SHIPPED | OUTPATIENT
Start: 2023-10-09 | End: 2023-11-18 | Stop reason: SDUPTHER

## 2023-10-09 NOTE — TELEPHONE ENCOUNTER
Spoke with pt - verbalized understanding of lab results and med called in. Repeat lab scheduled for 2/10/2024

## 2023-10-09 NOTE — TELEPHONE ENCOUNTER
Please let her know that her liver,kidney and thyroid look good. Normal blood counts. No diabetes.     Her cholesterol is too elevated and very similar to older readings. She needs to start on treatment.  I am sending atorvastatin 10 mg daily to pharm and then recheck lipids in 4 months. Please schedule    Thanks

## 2023-10-13 ENCOUNTER — HOSPITAL ENCOUNTER (OUTPATIENT)
Dept: RADIOLOGY | Facility: HOSPITAL | Age: 38
Discharge: HOME | End: 2023-10-13
Attending: INTERNAL MEDICINE
Payer: COMMERCIAL

## 2023-10-13 DIAGNOSIS — R92.8 ABNORMAL MAMMOGRAM: ICD-10-CM

## 2023-10-13 PROCEDURE — 77066 MAMMO DIGITAL DIAGNOSTIC BILAT WITH TOMO: ICD-10-PCS | Mod: 26,,, | Performed by: RADIOLOGY

## 2023-10-13 PROCEDURE — 77066 DX MAMMO INCL CAD BI: CPT | Mod: 26,,, | Performed by: RADIOLOGY

## 2023-10-13 PROCEDURE — 77062 MAMMO DIGITAL DIAGNOSTIC BILAT WITH TOMO: ICD-10-PCS | Mod: 26,,, | Performed by: RADIOLOGY

## 2023-10-13 PROCEDURE — 76642 ULTRASOUND BREAST LIMITED: CPT | Mod: TC,50,PO

## 2023-10-13 PROCEDURE — 77066 DX MAMMO INCL CAD BI: CPT | Mod: TC,PO

## 2023-10-13 PROCEDURE — 76642 US BREAST BILATERAL LIMITED: ICD-10-PCS | Mod: 26,50,, | Performed by: RADIOLOGY

## 2023-10-13 PROCEDURE — 77062 BREAST TOMOSYNTHESIS BI: CPT | Mod: 26,,, | Performed by: RADIOLOGY

## 2023-10-13 PROCEDURE — 76642 ULTRASOUND BREAST LIMITED: CPT | Mod: 26,50,, | Performed by: RADIOLOGY

## 2023-10-18 ENCOUNTER — TELEPHONE (OUTPATIENT)
Dept: FAMILY MEDICINE | Facility: CLINIC | Age: 38
End: 2023-10-18
Payer: COMMERCIAL

## 2023-10-18 ENCOUNTER — OFFICE VISIT (OUTPATIENT)
Dept: PSYCHIATRY | Facility: CLINIC | Age: 38
End: 2023-10-18
Payer: COMMERCIAL

## 2023-10-18 VITALS
DIASTOLIC BLOOD PRESSURE: 86 MMHG | HEART RATE: 118 BPM | HEIGHT: 65 IN | SYSTOLIC BLOOD PRESSURE: 133 MMHG | BODY MASS INDEX: 32.15 KG/M2 | WEIGHT: 193 LBS

## 2023-10-18 DIAGNOSIS — F40.10 SOCIAL ANXIETY DISORDER: ICD-10-CM

## 2023-10-18 DIAGNOSIS — F33.1 MAJOR DEPRESSIVE DISORDER, RECURRENT EPISODE, MODERATE: Primary | ICD-10-CM

## 2023-10-18 DIAGNOSIS — F41.1 GAD (GENERALIZED ANXIETY DISORDER): ICD-10-CM

## 2023-10-18 DIAGNOSIS — F90.2 ATTENTION DEFICIT HYPERACTIVITY DISORDER (ADHD), COMBINED TYPE: ICD-10-CM

## 2023-10-18 DIAGNOSIS — F51.01 PRIMARY INSOMNIA: ICD-10-CM

## 2023-10-18 DIAGNOSIS — R92.8 ABNORMAL MAMMOGRAM: Primary | ICD-10-CM

## 2023-10-18 PROCEDURE — 3075F SYST BP GE 130 - 139MM HG: CPT | Mod: CPTII,S$GLB,,

## 2023-10-18 PROCEDURE — 3075F PR MOST RECENT SYSTOLIC BLOOD PRESS GE 130-139MM HG: ICD-10-PCS | Mod: CPTII,S$GLB,,

## 2023-10-18 PROCEDURE — 1160F RVW MEDS BY RX/DR IN RCRD: CPT | Mod: CPTII,S$GLB,,

## 2023-10-18 PROCEDURE — 99999 PR PBB SHADOW E&M-EST. PATIENT-LVL IV: ICD-10-PCS | Mod: PBBFAC,,,

## 2023-10-18 PROCEDURE — 1159F MED LIST DOCD IN RCRD: CPT | Mod: CPTII,S$GLB,,

## 2023-10-18 PROCEDURE — 99999 PR PBB SHADOW E&M-EST. PATIENT-LVL IV: CPT | Mod: PBBFAC,,,

## 2023-10-18 PROCEDURE — 1159F PR MEDICATION LIST DOCUMENTED IN MEDICAL RECORD: ICD-10-PCS | Mod: CPTII,S$GLB,,

## 2023-10-18 PROCEDURE — 1160F PR REVIEW ALL MEDS BY PRESCRIBER/CLIN PHARMACIST DOCUMENTED: ICD-10-PCS | Mod: CPTII,S$GLB,,

## 2023-10-18 PROCEDURE — 3079F DIAST BP 80-89 MM HG: CPT | Mod: CPTII,S$GLB,,

## 2023-10-18 PROCEDURE — 3044F HG A1C LEVEL LT 7.0%: CPT | Mod: CPTII,S$GLB,,

## 2023-10-18 PROCEDURE — 3044F PR MOST RECENT HEMOGLOBIN A1C LEVEL <7.0%: ICD-10-PCS | Mod: CPTII,S$GLB,,

## 2023-10-18 PROCEDURE — 90792 PSYCH DIAG EVAL W/MED SRVCS: CPT | Mod: S$GLB,,,

## 2023-10-18 PROCEDURE — 3079F PR MOST RECENT DIASTOLIC BLOOD PRESSURE 80-89 MM HG: ICD-10-PCS | Mod: CPTII,S$GLB,,

## 2023-10-18 PROCEDURE — 90792 PR PSYCHIATRIC DIAGNOSTIC EVALUATION W/MEDICAL SERVICES: ICD-10-PCS | Mod: S$GLB,,,

## 2023-10-18 RX ORDER — TRAZODONE HYDROCHLORIDE 50 MG/1
50 TABLET ORAL NIGHTLY PRN
Qty: 90 TABLET | Refills: 0 | Status: SHIPPED | OUTPATIENT
Start: 2023-10-18 | End: 2023-11-18 | Stop reason: SDUPTHER

## 2023-10-18 RX ORDER — VENLAFAXINE HYDROCHLORIDE 75 MG/1
CAPSULE, EXTENDED RELEASE ORAL
Qty: 46 CAPSULE | Refills: 0 | Status: SHIPPED | OUTPATIENT
Start: 2023-10-18 | End: 2023-11-18 | Stop reason: SDUPTHER

## 2023-10-18 NOTE — PROGRESS NOTES
"OUTPATIENT PSYCHIATRY INITIAL VISIT    Encounter Date: 10/30/2023    ID: Christel Alejo, a 38 y.o. female, presenting for initial evaluation visit. Met with patient. Informed of confidentiality rights and limitations. Discussed provider role in the treatment team.    Reason for Encounter: Referral from Hallie Joseph DO   Chief Complaint   Patient presents with    Establish Care    Medication Refill     CC: anxiety and depression     HISTORY OF PRESENT ILLNESS:   Pt. is a 38 y.o. female, with a past psychiatric hx of MDD, anxiety, ADHD, and insomnia presenting to the clinic for an initial evaluation and treatment. PMHx outlined below. Pt is not currently taking psychotropic medications.    Patient reports a long history of anxiety and states I have always had anxiety." She reports anxiety worsened after she had children.  She was hospitalized with COVID 2 years ago and states she "hasn't recovered in terms of work, cognitive ability. I can't focus. I'm losing time management ability. I have brain fog. It's affecting my career." Patient was most recently on Viibryd which stopped working several months ago at which time she began struggling with anxiety and depression.  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  She also reports becoming flushed and anxious when interacting with people. She reports insomnia despite taking Vistaril 50 mg q.h.s..    She was diagnosed with ADHD by Dr Bhagat approximately 1 year ago, and notes her children have ADHD as well. Was taking Vyvanse 50 mg, which improved focus and concentration, but increased anxiety.  She did try taking Vyvanse 30 which was effective and caused less anxiety      PSYCHIATRIC REVIEW OF SYMPTOMS  Is patient currently experiencing or having changes in:    Mood/Depression:  Mood:  "I'm not depressed" but does report low mood  Interest/pleasure/anhedonia: low interest, low motivation, no longer engaging in " "hobbies  Guilt/worthlessness/hopelessness: no  Sleep: insomnia, can fall asleep, continues to wake up at 3 am, takes hydroxyzine, difficulty returning to sleep, racing thoughts  Energy: "very sluggish"  Appetite/weight: no  Concentration/indecisiveness: no  Psychomotor activity: no  S.I.B.s/risky behavior: no  SI: no    Anxiety:  Excessive anxiety and worry: yes  Restlessness/'on edge': +"on edge"  Irritability: increased  Muscle tension: yes, sees a chiropractor and MT; +bruxism  Difficulty concentrating/mind going blank: yes  Sleep disturbance: yes  Fatigues easily: yes  Panic attacks:  In the past, none recently  Agoraphobia: no  Social phobia: no    PTSD:  Recurrent nightmares: no  Flashbacks: no  Avoidance of stimuli: no  Hyper startle response: no   Dissociative episodes: no    OCD:  Recurrent thoughts: no  Recurrent behaviors: no    Chantal/Hypomania:   Distractibility: no  Indiscretion: no  Grandiosity: no   Racing thoughts/Flight of ideas: no  Increased activity: no  Reduced need for sleep: no  Talkativeness/Pressured speech: no     Psychosis:   A/V hallucinations: no  Delusions: no  Paranoia: no      PSYCHOTROPIC MEDICATION HISTORY (Highest Dose)  Zoloft 50 mg daily, Paxil (nausea), viibryd 40 mg daily  xanax 0.25 mg p.r.n. anxiety (effective, hasn't needed)  Trazodone (effective)  Not escitalopram, prozac, effexor, cymbalta, fluoxetine, bupropion      PAST PSYCHIATRIC HISTORY:  Psychiatric Care (current & past):  Dr Bhagat for treatment of ADHD, patient's OBGYN prescribed antidepressants in the past  Previous Psychiatric Diagnoses:  ADHD, AKYODE, MDD  Previous Psychiatric Hospitalizations: denies  Previous SI/HI:  denies  Previous Suicide Attempts or NSSI: denies  History of Psychotherapy: denies  History of Violence: denies    PAST MEDICAL HISTORY:   Past Medical History:   Diagnosis Date    Allergy     Anxiety     Depression     History of cervical dysplasia     s/p conization followers with Dr Vargas    S/P " LASIK surgery of both eyes     Wish to become pregnant in the immediate future[if female of childbearing age]: no    NEUROLOGIC HISTORY:  Seizures:  denies   Head trauma:  denies    PAST SURGICAL HISTORY:  Past Surgical History:   Procedure Laterality Date    lasix Bilateral 05/06/2022    LYMPH NODE DISSECTION      one behind left ear as teenager       Review of patient's allergies indicates:   Allergen Reactions    Paxil [paroxetine hcl] Nausea Only        FAMILY HISTORY:   Paternal: abusive alcoholic father  Maternal: no psychiatric history or history of substance abuse or suicide  Children:  ADHD    SOCIAL HISTORY:   Marital Status/Relationship Status:  x 19 years  Children: 2 children, boys, 14 and 13   Resides/Housing Status: [place]   Occupation/Employment:   Hobbies/Recreational Activities: crafting, shopping    Spirituality/Faith: raised Yarsanism, not practicing  Education level: HS grad   History: denies  Legal History: denies  Access to firearms: yes, locked in a safe,  is a gregory    SUBSTANCE USE HISTORY:  Caffeine: coffee 1 large cup in AM  Tobacco: denies  Alcohol: occasionally  Other Substances: denies  Rehab: denies  Detoxes:  denies      CURRENT MEDICATIONS  Outpatient Encounter Medications as of 10/18/2023   Medication Sig Dispense Refill    atorvastatin (LIPITOR) 10 MG tablet Take 1 tablet (10 mg total) by mouth once daily. 90 tablet 3    cetirizine (ZYRTEC) 10 MG tablet Take 10 mg by mouth once daily.      INTRAUTERINE DEVICE, IUD, IU by Intrauterine route.      levonorgestreL (MIRENA) 20 mcg/24 hours (7 yrs) 52 mg IUD Mirena 20 mcg/24 hours (7 yrs) 52 mg intrauterine device   Take by intrauterine route.      [DISCONTINUED] hydrOXYzine (ATARAX) 50 MG tablet Take 1 tablet (50 mg total) by mouth nightly as needed (sleep). (Patient not taking: Reported on 10/25/2023) 30 tablet 4    [DISCONTINUED] sulfamethoxazole-trimethoprim 800-160mg (BACTRIM DS)  800-160 mg Tab Take 1 tablet by mouth 2 (two) times daily. (Patient not taking: Reported on 10/25/2023) 20 tablet 0    [DISCONTINUED] vilazodone (VIIBRYD) 40 mg Tab tablet TAKE 1 TABLET BY MOUTH ONCE DAILY 90 tablet 0    ALPRAZolam (XANAX) 0.5 MG tablet Take 1 tablet (0.5 mg total) by mouth daily as needed for Anxiety. 30 tablet 0    traZODone (DESYREL) 50 MG tablet Take 1 tablet (50 mg total) by mouth nightly as needed for Insomnia. 90 tablet 0    venlafaxine (EFFEXOR-XR) 75 MG 24 hr capsule Take 1 capsule (75 mg total) by mouth once daily for 14 days, THEN 2 capsules (150 mg total) once daily for 16 days. 46 capsule 0     Facility-Administered Encounter Medications as of 10/18/2023   Medication Dose Route Frequency Provider Last Rate Last Admin    acetaminophen tablet 650 mg  650 mg Oral Once CYNDIN Bessy Guerrero MD           LABORATORY DATA  Lab Visit on 10/06/2023   Component Date Value Ref Range Status    WBC 10/06/2023 6.29  3.90 - 12.70 K/uL Final    RBC 10/06/2023 4.72  4.00 - 5.40 M/uL Final    Hemoglobin 10/06/2023 13.9  12.0 - 16.0 g/dL Final    Hematocrit 10/06/2023 42.1  37.0 - 48.5 % Final    MCV 10/06/2023 89  82 - 98 fL Final    MCH 10/06/2023 29.4  27.0 - 31.0 pg Final    MCHC 10/06/2023 33.0  32.0 - 36.0 g/dL Final    RDW 10/06/2023 12.6  11.5 - 14.5 % Final    Platelets 10/06/2023 231  150 - 450 K/uL Final    MPV 10/06/2023 12.5  9.2 - 12.9 fL Final    Immature Granulocytes 10/06/2023 0.2  0.0 - 0.5 % Final    Gran # (ANC) 10/06/2023 3.5  1.8 - 7.7 K/uL Final    Immature Grans (Abs) 10/06/2023 0.01  0.00 - 0.04 K/uL Final    Lymph # 10/06/2023 2.2  1.0 - 4.8 K/uL Final    Mono # 10/06/2023 0.5  0.3 - 1.0 K/uL Final    Eos # 10/06/2023 0.1  0.0 - 0.5 K/uL Final    Baso # 10/06/2023 0.04  0.00 - 0.20 K/uL Final    nRBC 10/06/2023 0  0 /100 WBC Final    Gran % 10/06/2023 55.1  38.0 - 73.0 % Final    Lymph % 10/06/2023 35.0  18.0 - 48.0 % Final    Mono % 10/06/2023 8.1  4.0 - 15.0 % Final     Eosinophil % 10/06/2023 1.0  0.0 - 8.0 % Final    Basophil % 10/06/2023 0.6  0.0 - 1.9 % Final    Differential Method 10/06/2023 Automated   Final    Sodium 10/06/2023 139  136 - 145 mmol/L Final    Potassium 10/06/2023 4.6  3.5 - 5.1 mmol/L Final    Chloride 10/06/2023 104  95 - 110 mmol/L Final    CO2 10/06/2023 24  23 - 29 mmol/L Final    Glucose 10/06/2023 92  70 - 110 mg/dL Final    BUN 10/06/2023 10  6 - 20 mg/dL Final    Creatinine 10/06/2023 0.7  0.5 - 1.4 mg/dL Final    Calcium 10/06/2023 9.4  8.7 - 10.5 mg/dL Final    Total Protein 10/06/2023 7.8  6.0 - 8.4 g/dL Final    Albumin 10/06/2023 4.1  3.5 - 5.2 g/dL Final    Total Bilirubin 10/06/2023 0.7  0.1 - 1.0 mg/dL Final    Alkaline Phosphatase 10/06/2023 62  55 - 135 U/L Final    AST 10/06/2023 22  10 - 40 U/L Final    ALT 10/06/2023 32  10 - 44 U/L Final    eGFR 10/06/2023 >60.0  >60 mL/min/1.73 m^2 Final    Anion Gap 10/06/2023 11  8 - 16 mmol/L Final    Cholesterol 10/06/2023 277 (H)  120 - 199 mg/dL Final    Triglycerides 10/06/2023 193 (H)  30 - 150 mg/dL Final    HDL 10/06/2023 60  40 - 75 mg/dL Final    LDL Cholesterol 10/06/2023 178.4 (H)  63.0 - 159.0 mg/dL Final    HDL/Cholesterol Ratio 10/06/2023 21.7  20.0 - 50.0 % Final    Total Cholesterol/HDL Ratio 10/06/2023 4.6  2.0 - 5.0 Final    Non-HDL Cholesterol 10/06/2023 217  mg/dL Final    TSH 10/06/2023 1.507  0.400 - 4.000 uIU/mL Final    Hemoglobin A1C 10/06/2023 5.2  4.0 - 5.6 % Final    Estimated Avg Glucose 10/06/2023 103  68 - 131 mg/dL Final    HIV 1/2 Ag/Ab 10/06/2023 Non-reactive  Non-reactive Final       MEDICAL REVIEW OF SYSTEMS:  Pain: Denies any significant chronic or acute pain.  Constitutional: Denies fever or change in appetite.  Cardiovascular: Denies chest pain or exertional dyspnea.  Respiratory: Denies cough or orthopnea.   GI: Denies abdominal pain, N/V  Neurological: Denies tremor, seizure, or focal weakness.  Psychiatric: See HPI above.    EXAM:  Nutritional Screening:  "Considering the patient's height and weight, medications, medical history and preferences, should a referral be made to the dietitian? No    Vitals: most recent vital signs were reviewed:  /86   Pulse (!) 118   Ht 5' 5" (1.651 m)   Wt 87.5 kg (193 lb 0.2 oz)   BMI 32.12 kg/m²     General: age appropriate, well nourished, casually dressed, neatly groomed  MSK: muscle strength/tone: no tremor or abnormal movements.   Gait/Station: no ataxia, steady    PSYCHIATRIC:  Speech: Normal rate, rhythm, volume. No latency, no pressured speech  Mood/Affect: anxious, congruent, and appropriate   Though Process: organized, logical, linear  Thought Content: no suicidal or homicidal ideation, no A/V hallucinations, delusions, or paranoia  Insight: Intact; aware of illness  Judgement: behavior is adequate to circumstances  Orientation: A&O x 4  Memory: Intact for content of interview, 3/3 immediate, 3/3 after 3 mins. Able to recall recent and remote events.  Language: Grossly intact, no aphasias   Concentration: Spells "world" correctly forward & backwards  Knowledge/Intelligence: appropriate to age and level of education.     SUICIDE RISK ASSESSMENT:  Protective factors: age, gender, no prior attempts, no prior hospitalizations, no family h/o attempts, no ongoing substance abuse, no psychosis, , has children, denies SI/intent/plan, seeking treatment, access to treatment, future oriented, good primary support  Risks:  Ongoing depression anxiety, history of ADHD, access to firearms  Patient is a low immediate and long-term risk considering risk factors.       IMPRESSION:    Christel Alejo is a 38 y.o. female that appears to be a reliable informant and is committed to working towards the goals of the treatment plan. Patient has a history of MDD, anxiety, ADHD, and insomnia . Presents today with symptoms of  MDD, KAYODE, SAD, ADHD, insomnia, see HPI.       DIAGNOSES:    ICD-10-CM ICD-9-CM   1. Major depressive " disorder, recurrent episode, moderate  F33.1 296.32   2. KAYODE (generalized anxiety disorder)  F41.1 300.02   3. Primary insomnia  F51.01 307.42   4. Attention deficit hyperactivity disorder (ADHD), combined type  F90.2 314.01   5. Social anxiety disorder  F40.10 300.23       STRENGTHS AND LIABILITIES: Strength: Patient accepts guidance/feedback, Strength: Patient is expressive/articulate., Strength: Patient is intelligent., Strength: Patient is motivated for change., Strength: Patient has reasonable judgment., Liability: Patient lacks coping skills.    TREATMENT GOALS:      Depression: Identify coping skills to aid in management of presenting symptoms, identify a safety plan if depressive symptoms become unmanageable, a noted decrease in depressive symptoms, and an increased client rating of quality of life. Participate in psychotherapy as indicated. Medication adherence.    Anxiety: Identify coping skills including deep breathing, grounding, time set aside for worry, and other identified skills, decrease in presenting anxiety related symptoms, and increased client rating of quality of life. Participate in psychotherapy as indicted. Medication adherence.    ADHD: Decrease in symptoms of inattentiveness, hyperactivity, and impulsiveness. Improvement in concentration as evidenced by enhanced work performance and at home behaviors. Identify skills to aid in managing symptoms including organizational skills, and identifying consequences and results of specified behaviors. Participate in psychotherapy as indicated. Medication adherence.    Insomnia: reduction of sleep and waking symptoms, improvement of daytime function, and the reduction of distress related to lack of sleep      PLAN:  Start venlafaxine ER 75 mg daily for 14 days then increase to 150 mg daily for mood and anxiety  Start trazodone 50 mg q.h.s. p.r.n. insomnia  Offered referral for individual psychotherapy.      Return to Clinic: 1 month      Jaz REYNAGA  KARL Koch, PMHNP-BC      60 minutes spent on this encounter, >50% time spent in counseling.     At this time there are no indications the patient represents an imminent danger to either themselves or others; will continue to manage treatment in the outpatient setting.    I discussed the patient's care with the patient including benefits, alternatives, possible adverse effects of the treatment plan; including the potential for metabolic complications, major organ dysfunction, black box warnings, and contraindications. The opportunity was given for questions/clarification, and after this discussion the above treatment plan was devised through shared decision making. The patient voiced their understanding of the diagnoses and treatments listed above and agreed to the treatment plan. Follow up plan was reviewed with the patient. The patient was advised to call to report any worsening of symptoms or problems with medication.    Supportive therapy and psychoeducation provided. I discussed the importance of regular exercise, maintenance of a healthy weight, balanced diet rich in fruits/vegetables and lean protein, and avoidance of unhealthy habits like smoking and excessive alcohol intake. Educated patient about activating patient portal to receive education material. Patient agreed and understands that I will be providing reading material to help understand treatment.     Patient has been given crisis information including Suicide and Crisis Lifeline (call or text: 962). Patient also given instructions to go to the nearest ER or call 911 if unable to remain safe or if the Pt develops thoughts of harming self or others.    Reviewed past records regarding symptoms and treatments that have brought the patient to today's visit.     [facility]: Reviewed today to detect potential controlled substance misuse, diversion, excessive prescribing, or multiple providers prescribing controlled substances. The patients report  "was deemed appropriate without new medications of concerns prescribed by other providers.    Documentation entered by me for this encounter may have been done in part using [employer] voice recognition transcription software. Garbled syntax, mangled pronouns, and other bizarre constructions may be attributed to that software system. Although I have made an effort to ensure accuracy, "sound like" errors may exist and should be interpreted in context.    "

## 2023-10-18 NOTE — TELEPHONE ENCOUNTER
Spoke with pt about results. Sent teams message to Sharon Nunez to contact pt to schedule the Diag. Mammo / US.

## 2023-10-18 NOTE — TELEPHONE ENCOUNTER
Please let her know her diagnostic mammogram of the left breast showed a mass that looks benign. Advised to recheck mammogram on the left in 6 months.     Please schedule    Thanks

## 2023-10-25 ENCOUNTER — OFFICE VISIT (OUTPATIENT)
Dept: OBSTETRICS AND GYNECOLOGY | Facility: CLINIC | Age: 38
End: 2023-10-25
Payer: COMMERCIAL

## 2023-10-25 VITALS
BODY MASS INDEX: 31.81 KG/M2 | WEIGHT: 191.13 LBS | DIASTOLIC BLOOD PRESSURE: 89 MMHG | SYSTOLIC BLOOD PRESSURE: 132 MMHG

## 2023-10-25 DIAGNOSIS — Z97.5 IUD CONTRACEPTION: ICD-10-CM

## 2023-10-25 DIAGNOSIS — Z12.4 SCREENING FOR CERVICAL CANCER: ICD-10-CM

## 2023-10-25 DIAGNOSIS — Z01.419 ENCOUNTER FOR ANNUAL ROUTINE GYNECOLOGICAL EXAMINATION: ICD-10-CM

## 2023-10-25 DIAGNOSIS — Z12.4 SCREENING FOR MALIGNANT NEOPLASM OF CERVIX: Primary | ICD-10-CM

## 2023-10-25 PROCEDURE — 99386 PR PREVENTIVE VISIT,NEW,40-64: ICD-10-PCS | Mod: S$GLB,,, | Performed by: OBSTETRICS & GYNECOLOGY

## 2023-10-25 PROCEDURE — 88175 CYTOPATH C/V AUTO FLUID REDO: CPT | Performed by: OBSTETRICS & GYNECOLOGY

## 2023-10-25 PROCEDURE — 99999 PR PBB SHADOW E&M-EST. PATIENT-LVL IV: ICD-10-PCS | Mod: PBBFAC,,, | Performed by: OBSTETRICS & GYNECOLOGY

## 2023-10-25 PROCEDURE — 3044F PR MOST RECENT HEMOGLOBIN A1C LEVEL <7.0%: ICD-10-PCS | Mod: CPTII,S$GLB,, | Performed by: OBSTETRICS & GYNECOLOGY

## 2023-10-25 PROCEDURE — 99386 PREV VISIT NEW AGE 40-64: CPT | Mod: S$GLB,,, | Performed by: OBSTETRICS & GYNECOLOGY

## 2023-10-25 PROCEDURE — 3079F DIAST BP 80-89 MM HG: CPT | Mod: CPTII,S$GLB,, | Performed by: OBSTETRICS & GYNECOLOGY

## 2023-10-25 PROCEDURE — 3044F HG A1C LEVEL LT 7.0%: CPT | Mod: CPTII,S$GLB,, | Performed by: OBSTETRICS & GYNECOLOGY

## 2023-10-25 PROCEDURE — 1160F RVW MEDS BY RX/DR IN RCRD: CPT | Mod: CPTII,S$GLB,, | Performed by: OBSTETRICS & GYNECOLOGY

## 2023-10-25 PROCEDURE — 1159F MED LIST DOCD IN RCRD: CPT | Mod: CPTII,S$GLB,, | Performed by: OBSTETRICS & GYNECOLOGY

## 2023-10-25 PROCEDURE — 99999 PR PBB SHADOW E&M-EST. PATIENT-LVL IV: CPT | Mod: PBBFAC,,, | Performed by: OBSTETRICS & GYNECOLOGY

## 2023-10-25 PROCEDURE — 3008F BODY MASS INDEX DOCD: CPT | Mod: CPTII,S$GLB,, | Performed by: OBSTETRICS & GYNECOLOGY

## 2023-10-25 PROCEDURE — 3075F SYST BP GE 130 - 139MM HG: CPT | Mod: CPTII,S$GLB,, | Performed by: OBSTETRICS & GYNECOLOGY

## 2023-10-25 PROCEDURE — 1159F PR MEDICATION LIST DOCUMENTED IN MEDICAL RECORD: ICD-10-PCS | Mod: CPTII,S$GLB,, | Performed by: OBSTETRICS & GYNECOLOGY

## 2023-10-25 PROCEDURE — 3008F PR BODY MASS INDEX (BMI) DOCUMENTED: ICD-10-PCS | Mod: CPTII,S$GLB,, | Performed by: OBSTETRICS & GYNECOLOGY

## 2023-10-25 PROCEDURE — 1160F PR REVIEW ALL MEDS BY PRESCRIBER/CLIN PHARMACIST DOCUMENTED: ICD-10-PCS | Mod: CPTII,S$GLB,, | Performed by: OBSTETRICS & GYNECOLOGY

## 2023-10-25 PROCEDURE — 3079F PR MOST RECENT DIASTOLIC BLOOD PRESSURE 80-89 MM HG: ICD-10-PCS | Mod: CPTII,S$GLB,, | Performed by: OBSTETRICS & GYNECOLOGY

## 2023-10-25 PROCEDURE — 3075F PR MOST RECENT SYSTOLIC BLOOD PRESS GE 130-139MM HG: ICD-10-PCS | Mod: CPTII,S$GLB,, | Performed by: OBSTETRICS & GYNECOLOGY

## 2023-10-25 NOTE — PROGRESS NOTES
Chief Complaint   Patient presents with    [facility]    [facility]     Mirena note from patient 2019        History and Physical:  No LMP recorded. Patient has had an implant.       Christel Alejo is a 38 y.o.  WF who presents today for her routine annual GYN exam. The patient has no Gynecology complaints today. No menses with IUD, placed ?2019      Allergies:   Review of patient's allergies indicates:   Allergen Reactions    Paxil [paroxetine hcl] Nausea Only       Past Medical History:   Diagnosis Date    Allergy     Anxiety     Depression     History of cervical dysplasia     s/p conization followers with Dr Vargas    S/P LASIK surgery of both eyes        Past Surgical History:   Procedure Laterality Date    lasix Bilateral 2022    LYMPH NODE DISSECTION      one behind left ear as teenager       MEDS:   Current Outpatient Medications on File Prior to Visit   Medication Sig Dispense Refill    atorvastatin (LIPITOR) 10 MG tablet Take 1 tablet (10 mg total) by mouth once daily. 90 tablet 3    cetirizine (ZYRTEC) 10 MG tablet Take 10 mg by mouth once daily.      INTRAUTERINE DEVICE, IUD, IU by Intrauterine route.      levonorgestreL (MIRENA) 20 mcg/24 hours (7 yrs) 52 mg IUD Mirena 20 mcg/24 hours (7 yrs) 52 mg intrauterine device   Take by intrauterine route.      traZODone (DESYREL) 50 MG tablet Take 1 tablet (50 mg total) by mouth nightly as needed for Insomnia. 90 tablet 0    venlafaxine (EFFEXOR-XR) 75 MG 24 hr capsule Take 1 capsule (75 mg total) by mouth once daily for 14 days, THEN 2 capsules (150 mg total) once daily for 16 days. 46 capsule 0    ALPRAZolam (XANAX) 0.5 MG tablet Take 1 tablet (0.5 mg total) by mouth daily as needed for Anxiety. 30 tablet 0    [DISCONTINUED] hydrOXYzine (ATARAX) 50 MG tablet Take 1 tablet (50 mg total) by mouth nightly as needed (sleep). (Patient not taking: Reported on 10/25/2023) 30 tablet 4    [DISCONTINUED] sulfamethoxazole-trimethoprim  800-160mg (BACTRIM DS) 800-160 mg Tab Take 1 tablet by mouth 2 (two) times daily. (Patient not taking: Reported on 10/25/2023) 20 tablet 0     Current Facility-Administered Medications on File Prior to Visit   Medication Dose Route Frequency Provider Last Rate Last Admin    acetaminophen tablet 650 mg  650 mg Oral Once PRN Bessy Guerrero MD           OB History          2    Para   2    Term   2            AB        Living   2         SAB        IAB        Ectopic        Multiple        Live Births                     Social History     Socioeconomic History    Marital status:    Tobacco Use    Smoking status: Never    Smokeless tobacco: Never   Substance and Sexual Activity    Alcohol use: Yes     Comment: once in a while    Drug use: No    Sexual activity: Yes     Partners: Male     Birth control/protection: I.U.D.   Social History Narrative    Lives with  and 2 children.  Works in [employer] on [employer]       Social Determinants of Health     Financial Resource Strain: Low Risk  (10/5/2023)    Overall Financial Resource Strain (CARDIA)     Difficulty of Paying Living Expenses: Not very hard   Food Insecurity: No Food Insecurity (10/5/2023)    Hunger Vital Sign     Worried About Running Out of Food in the Last Year: Never true     Ran Out of Food in the Last Year: Never true   Transportation Needs: No Transportation Needs (10/5/2023)    PRAPARE - Transportation     Lack of Transportation (Medical): No     Lack of Transportation (Non-Medical): No   Physical Activity: Insufficiently Active (10/5/2023)    Exercise Vital Sign     Days of Exercise per Week: 3 days     Minutes of Exercise per Session: 30 min   Stress: Stress Concern Present (10/5/2023)    [employer] [place] [employer] - Occupational Stress Questionnaire     Feeling of Stress : Rather much   Social Connections: Unknown (10/5/2023)    Social Connection and Isolation Panel [NHANES]     Frequency of  Communication with Friends and Family: More than three times a week     Frequency of Social Gatherings with Friends and Family: Once a week     Active Member of Clubs or Organizations: No     Attends Club or Organization Meetings: Patient refused     Marital Status:    Housing Stability: Low Risk  (10/5/2023)    Housing Stability Vital Sign     Unable to Pay for Housing in the Last Year: No     Number of Places Lived in the Last Year: 1     Unstable Housing in the Last Year: No       Family History   Problem Relation Age of Onset    Breast cancer Maternal Grandmother 60    Arthritis Maternal Grandmother     Cancer Maternal Grandmother         breast    Diabetes Maternal Grandmother     Early death Maternal Grandmother     Heart disease Maternal Grandmother     Retinal detachment Maternal Grandmother     Cancer Maternal Grandfather         colon cancer    Diabetes Maternal Grandfather     Early death Maternal Grandfather     Heart disease Maternal Grandfather     Alcohol abuse Father     Cancer Father         BCC    Drug abuse Father     Heart disease Father 55        MI with 3 stents    Asthma Brother     ADD / ADHD Brother     ADD / ADHD Son     Macular degeneration Maternal Uncle     Ovarian cancer Neg Hx     Uterine cancer Neg Hx          Past medical and surgical history reviewed.   I have reviewed the patient's medical history in detail and updated the computerized patient record.        Review of System:   General: no chills, fever, night sweats, weight gain or weight loss  Psychological: no depression or suicidal ideation  Breasts: no new or changing breast lumps, nipple discharge or masses.  Respiratory: no cough, shortness of breath, or wheezing  Cardiovascular: no chest pain or dyspnea on exertion  Gastrointestinal: no abdominal pain, change in bowel habits, or black or bloody stools  Genito-Urinary: no incontinence, urinary frequency/urgency or vulvar/vaginal symptoms, pelvic pain or abnormal  vaginal bleeding.  Musculoskeletal: no gait disturbance or muscular weakness      Physical Exam:   /89   Wt 86.7 kg (191 lb 2.2 oz)   BMI 31.81 kg/m²   Constitutional: She appears alert and responsive. She appears well-developed, well-groomed, and well-nourished. No distress.   HENT:   Head: Normocephalic and atraumatic.   Eyes: Conjunctivae and EOM are normal. No scleral icterus.   Neck: Symmetrical. Normal range of motion. Neck supple. No tracheal deviation present. THYROID:  without masses or tenderness.  Cardiovascular: Normal rate, no rhythm abnormality noted. Extremities without swelling or edema, warm.    Pulmonary/Chest: Normal respiratory Effort. No distress or retractions. She exhibits no tenderness.  Breasts: are symmetrical.no masses    Right breast exhibits no inverted nipple, no mass, no nipple discharge, no skin change and no tenderness.   Left breast exhibits no inverted nipple, no mass, no nipple discharge, no skin change and no tenderness.  Abdominal: Soft. She exhibits no distension, hernias or masses. There is no tenderness. No enlargement of liver edge or spleen.  There is no rebound and no guarding.   Genitourinary:    External rectal exam shows no thrombosed external hemorrhoids, no lesions.     Pelvic exam was performed with patient supine.   No labial fusion, and symmetrical.    There is no rash, lesion or injury on the right labia.   There is no rash, lesion or injury on the left labia.   No bleeding and no signs of injury around the vaginal introitus, urethral meatus is normal size and without prolapse or lesions, urethra well supported. The cervix is visualized with no discharge, lesions or friability.   No vaginal discharge found.    No significant Cystocele, Enterocele or rectocele, and cervix and uterus well supported.   Bimanual exam:   The urethra is normal to palpation and there are no palpable vaginal wall masses.   Uterus is not deviated, not enlarged, not fixed, normal  shape and not tender.   Cervix exhibits no motion tenderness. IUD string visible   Right adnexum displays no mass or nodularity and no tenderness.   Left adnexum displays no mass or nodularity and no tenderness.  Musculoskeletal: Normal range of motion.   Lymphadenopathy: No inguinal adenopathy present.   Neurological: She is alert and oriented to person, place, and time. Coordination normal.   Skin: Skin is warm and dry. She is not diaphoretic. No rashes, lesions or ulcers.   Psychiatric: She has a normal mood and affect, oriented to person, place, and time.      Assessment:   Normal annual GYN exam  1. Screening for malignant neoplasm of cervix  Liquid-Based Pap Smear, Screening      2. Screening for cervical cancer  Ambulatory referral/consult to Obstetrics / Gynecology      3. Encounter for annual routine gynecological examination        4. IUD contraception              Plan:   PAP  Mammogram done, need 6 mos f/u  Follow up in 1 year  Replace IUD at 7-8 yrs from insertion.  Patient informed will be contacted with results within 2 weeks. Encouraged to please call back or email if she has not heard from us by then.

## 2023-11-02 LAB
FINAL PATHOLOGIC DIAGNOSIS: NORMAL
Lab: NORMAL

## 2023-11-18 DIAGNOSIS — E78.5 HYPERLIPIDEMIA, UNSPECIFIED HYPERLIPIDEMIA TYPE: ICD-10-CM

## 2023-11-18 NOTE — TELEPHONE ENCOUNTER
No care due was identified.  [facility] Embedded Care Due Messages. Reference number: [id number].   11/18/2023 12:43:23 PM CST

## 2023-11-20 RX ORDER — VENLAFAXINE HYDROCHLORIDE 150 MG/1
150 CAPSULE, EXTENDED RELEASE ORAL DAILY
Qty: 90 CAPSULE | Refills: 0 | Status: SHIPPED | OUTPATIENT
Start: 2023-11-20 | End: 2024-01-31 | Stop reason: SDUPTHER

## 2023-11-20 RX ORDER — TRAZODONE HYDROCHLORIDE 50 MG/1
50 TABLET ORAL NIGHTLY PRN
Qty: 90 TABLET | Refills: 0 | Status: SHIPPED | OUTPATIENT
Start: 2023-11-20 | End: 2023-11-29 | Stop reason: ALTCHOICE

## 2023-11-20 NOTE — TELEPHONE ENCOUNTER
Refill Routing Note   Medication(s) are not appropriate for processing by [facility] for the following reason(s):        New or recently adjusted medication    [facility] action(s):  Defer               Appointments  past 12m or future 3m with PCP    Date Provider   Last Visit   10/6/2023 Hallie Joseph,    Next Visit   Visit date not found Hallie Joseph, DO   ED visits in past 90 days: 0        Note composed:5:07 PM 11/20/2023

## 2023-11-21 RX ORDER — ATORVASTATIN CALCIUM 10 MG/1
10 TABLET, FILM COATED ORAL DAILY
Qty: 90 TABLET | Refills: 3 | Status: SHIPPED | OUTPATIENT
Start: 2023-11-21 | End: 2024-01-31 | Stop reason: SDUPTHER

## 2023-11-29 ENCOUNTER — OFFICE VISIT (OUTPATIENT)
Dept: PSYCHIATRY | Facility: CLINIC | Age: 38
End: 2023-11-29
Payer: COMMERCIAL

## 2023-11-29 ENCOUNTER — OFFICE VISIT (OUTPATIENT)
Dept: URGENT CARE | Facility: CLINIC | Age: 38
End: 2023-11-29
Payer: COMMERCIAL

## 2023-11-29 VITALS
HEIGHT: 65 IN | BODY MASS INDEX: 31.82 KG/M2 | TEMPERATURE: 99 F | OXYGEN SATURATION: 98 % | DIASTOLIC BLOOD PRESSURE: 87 MMHG | HEART RATE: 100 BPM | WEIGHT: 191 LBS | RESPIRATION RATE: 18 BRPM | SYSTOLIC BLOOD PRESSURE: 124 MMHG

## 2023-11-29 DIAGNOSIS — F41.1 GAD (GENERALIZED ANXIETY DISORDER): ICD-10-CM

## 2023-11-29 DIAGNOSIS — F90.2 ATTENTION DEFICIT HYPERACTIVITY DISORDER (ADHD), COMBINED TYPE: ICD-10-CM

## 2023-11-29 DIAGNOSIS — J10.1 INFLUENZA B: Primary | ICD-10-CM

## 2023-11-29 DIAGNOSIS — R05.9 COUGH, UNSPECIFIED TYPE: ICD-10-CM

## 2023-11-29 DIAGNOSIS — F40.10 SOCIAL ANXIETY DISORDER: ICD-10-CM

## 2023-11-29 DIAGNOSIS — F51.01 PRIMARY INSOMNIA: ICD-10-CM

## 2023-11-29 DIAGNOSIS — F33.1 MAJOR DEPRESSIVE DISORDER, RECURRENT EPISODE, MODERATE: Primary | ICD-10-CM

## 2023-11-29 LAB
CTP QC/QA: YES
CTP QC/QA: YES
POC MOLECULAR INFLUENZA A AGN: NEGATIVE
POC MOLECULAR INFLUENZA B AGN: POSITIVE
SARS-COV-2 AG RESP QL IA.RAPID: NEGATIVE

## 2023-11-29 PROCEDURE — 99213 OFFICE O/P EST LOW 20 MIN: CPT | Mod: 25,S$GLB,, | Performed by: PHYSICIAN ASSISTANT

## 2023-11-29 PROCEDURE — 96372 PR INJECTION,THERAP/PROPH/DIAG2ST, IM OR SUBCUT: ICD-10-PCS | Mod: S$GLB,,, | Performed by: PHYSICIAN ASSISTANT

## 2023-11-29 PROCEDURE — 87502 POCT INFLUENZA A/B MOLECULAR: ICD-10-PCS | Mod: QW,S$GLB,, | Performed by: PHYSICIAN ASSISTANT

## 2023-11-29 PROCEDURE — 1160F RVW MEDS BY RX/DR IN RCRD: CPT | Mod: CPTII,95,,

## 2023-11-29 PROCEDURE — 99213 PR OFFICE/OUTPT VISIT, EST, LEVL III, 20-29 MIN: ICD-10-PCS | Mod: 25,S$GLB,, | Performed by: PHYSICIAN ASSISTANT

## 2023-11-29 PROCEDURE — 87811 SARS CORONAVIRUS 2 ANTIGEN POCT, MANUAL READ: ICD-10-PCS | Mod: QW,S$GLB,, | Performed by: PHYSICIAN ASSISTANT

## 2023-11-29 PROCEDURE — 3044F PR MOST RECENT HEMOGLOBIN A1C LEVEL <7.0%: ICD-10-PCS | Mod: CPTII,95,,

## 2023-11-29 PROCEDURE — 1160F PR REVIEW ALL MEDS BY PRESCRIBER/CLIN PHARMACIST DOCUMENTED: ICD-10-PCS | Mod: CPTII,95,,

## 2023-11-29 PROCEDURE — 99215 PR OFFICE/OUTPT VISIT, EST, LEVL V, 40-54 MIN: ICD-10-PCS | Mod: 95,,,

## 2023-11-29 PROCEDURE — 87811 SARS-COV-2 COVID19 W/OPTIC: CPT | Mod: QW,S$GLB,, | Performed by: PHYSICIAN ASSISTANT

## 2023-11-29 PROCEDURE — 1159F PR MEDICATION LIST DOCUMENTED IN MEDICAL RECORD: ICD-10-PCS | Mod: CPTII,95,,

## 2023-11-29 PROCEDURE — 96372 THER/PROPH/DIAG INJ SC/IM: CPT | Mod: S$GLB,,, | Performed by: PHYSICIAN ASSISTANT

## 2023-11-29 PROCEDURE — 1159F MED LIST DOCD IN RCRD: CPT | Mod: CPTII,95,,

## 2023-11-29 PROCEDURE — 87502 INFLUENZA DNA AMP PROBE: CPT | Mod: QW,S$GLB,, | Performed by: PHYSICIAN ASSISTANT

## 2023-11-29 PROCEDURE — 3044F HG A1C LEVEL LT 7.0%: CPT | Mod: CPTII,95,,

## 2023-11-29 PROCEDURE — 99215 OFFICE O/P EST HI 40 MIN: CPT | Mod: 95,,,

## 2023-11-29 RX ORDER — LISDEXAMFETAMINE DIMESYLATE 30 MG/1
30 CAPSULE ORAL EVERY MORNING
Qty: 30 CAPSULE | Refills: 0 | Status: SHIPPED | OUTPATIENT
Start: 2023-11-29 | End: 2024-02-09 | Stop reason: SDUPTHER

## 2023-11-29 RX ORDER — BENZONATATE 200 MG/1
200 CAPSULE ORAL 3 TIMES DAILY PRN
Qty: 30 CAPSULE | Refills: 0 | Status: SHIPPED | OUTPATIENT
Start: 2023-11-29 | End: 2023-12-09

## 2023-11-29 RX ORDER — DOXEPIN HYDROCHLORIDE 10 MG/1
10 CAPSULE ORAL NIGHTLY
Qty: 30 CAPSULE | Refills: 2 | Status: SHIPPED | OUTPATIENT
Start: 2023-11-29 | End: 2024-02-09 | Stop reason: SDUPTHER

## 2023-11-29 RX ORDER — DEXAMETHASONE SODIUM PHOSPHATE 100 MG/10ML
10 INJECTION INTRAMUSCULAR; INTRAVENOUS
Status: COMPLETED | OUTPATIENT
Start: 2023-11-29 | End: 2023-11-29

## 2023-11-29 RX ADMIN — DEXAMETHASONE SODIUM PHOSPHATE 10 MG: 100 INJECTION INTRAMUSCULAR; INTRAVENOUS at 05:11

## 2023-11-29 NOTE — LETTER
November 29, 2023      [facility] [place] [address], [address]  [facility] [address]  Phone: [phone]  Fax: [phone]       Patient: Christel Alejo   YOB: 1985  Date of Visit: 11/29/2023    To Whom It May Concern:    Cee Alejo  was at [facility] on 11/29/2023. The patient may return to work/school on 12/4/2023 with no restrictions. If you have any questions or concerns, or if I can be of further assistance, please do not hesitate to contact me.    Sincerely,    BIPIN Cardona

## 2023-11-29 NOTE — PROGRESS NOTES
"OUTPATIENT PSYCHIATRY FOLLOW UP VISIT    Encounter Date: 11/29/2023    Clinical Status of Patient:  Outpatient (Virtual)  The patient location is: [address] [place] [address]  The patient phone number is: [phone]   Visit type: Virtual visit with synchronous audio and video  Each patient to whom he or she provides medical services by telemedicine is:  (1) informed of the relationship between the practitioner and patient and the respective role of any other health care provider with respect to management of the patient; and (2) notified that he or she may decline to receive medical services by telemedicine and may withdraw from such care at any time.    Chief Complaint:  Christel Alejo is a 38 y.o. female who presents today for follow-up.  Met with patient.      HISTORY OF PRESENTING ILLNESS:  Christel Alejo is a 38 y.o. female with history of MDD, KAYODE, SAD, ADHD, insomnia who presents for follow up appointment.      INITIAL HPI:  Pt. is a 38 y.o. female, with a past psychiatric hx of MDD, anxiety, ADHD, and insomnia presenting to the clinic for an initial evaluation and treatment. PMHx outlined below. Pt is not currently taking psychotropic medications.     Patient reports a long history of anxiety and states I have always had anxiety." She reports anxiety worsened after she had children.  She was hospitalized with COVID 2 years ago and states she "hasn't recovered in terms of work, cognitive ability. I can't focus. I'm losing time management ability. I have brain fog. It's affecting my career." Patient was most recently on Viibryd which stopped working several months ago at which time she began struggling with anxiety and depression.  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  She also reports becoming flushed and anxious when interacting with people. She reports insomnia despite taking Vistaril 50 mg q.h.s..     She was diagnosed with " "ADHD by Dr Bhagat approximately 1 year ago, and notes her children have ADHD as well. Was taking Vyvanse 50 mg, which improved focus and concentration, but increased anxiety.  She did try taking Vyvanse 30 which was effective and caused less anxiety       Plan at last appointment:  Start venlafaxine ER 75 mg daily for 14 days then increase to 150 mg daily for mood and anxiety  Start trazodone 50 mg q.h.s. p.r.n. insomnia  Offered referral for individual psychotherapy.    Psychotropic medication history:   Zoloft 50 mg daily, Paxil (nausea), viibryd 40 mg daily  xanax 0.25 mg p.r.n. anxiety (effective, hasn't needed)  Trazodone (effective)  Not escitalopram, prozac, effexor, cymbalta, fluoxetine, bupropion       INTERVAL HISTORY:    Venlafaxine ER 75 mg daily was started 1 month ago and increased to 150 mg daily 2 weeks ago.  Patient reports improvement in mood as well as generalized anxiety.    She does report worsening fatigue but has been ill with flu-like symptoms for the last several days.  She reports continued insomnia noting trazodone has not improved sleep.  She repeatedly wakes up around 3:00 a.m. and experiences difficulty returning to sleep.     She reports her main concern this time is her ability to focus and concentrate and states she is "struggling trying to keep up with work."    No interval episodes with symptoms consistent with eric or hypomania.  Denied interval or current suicidal/homicidal thoughts, intent, or plan or NSSI.  Denied other questions and concerns.  Patient reports feeling stable and wishing to continue current management unchanged.    Medication side effects: None  Medication adherence: yes    PSYCHIATRIC REVIEW OF SYSTEMS:  Is patient experiencing or having changes in:  Mood: improving  Trouble with sleep:  insomnia, can fall asleep, continues to wake up at 3 am, no improvement with trazodone, difficulty returning to sleep, racing thoughts  Appetite changes:  no  Weight changes:  " "no  Lack of energy:  "very sluggish"  Anhedonia: improving, has been taking care of plants and had more interest  Somatic symptoms:  no  Anxiety/panic:  improving  Irritability: no  Guilty/hopeless:  no  Concentration: no  Racing thoughts: no  Impulsive behaviors: no  Paranoia/AVH: no  Self-injurious behavior/risky behavior:  no  Any drugs:  no  Alcohol:  no      MEDICAL REVIEW OF SYSTEMS:   Pain: Denies any significant chronic or acute pain.  Constitutional: Denies fever or change in appetite.  Cardiovascular: Denies chest pain or exertional dyspnea.  Respiratory: Denies cough or orthopnea.   GI: Denies abdominal pain, N/V  Neurological: Denies tremor, seizure, or focal weakness.  Psychiatric: See HPI above.    PAST PSYCHIATRIC, MEDICAL, AND SOCIAL HISTORY REVIEWED  The patient's past medical, family and social history have been reviewed and updated as appropriate within the electronic medical record - see encounter notes.    PAST MEDICAL HISTORY:   Past Medical History:   Diagnosis Date    Allergy     Anxiety     Depression     History of cervical dysplasia     s/p conization followers with Dr Vargas    S/P LASIK surgery of both eyes     Head trauma/Loss of consciousness: denies  Seizures: denies     PAST PSYCHIATRIC HISTORY:  Psychiatric Care (current & past):  Dr Bhagat for treatment of ADHD, patient's OBGYN prescribed antidepressants in the past  Previous Psychiatric Diagnoses:  ADHD, KAYODE, MDD  Previous Psychiatric Hospitalizations: denies  Previous SI/HI:  denies  Previous Suicide Attempts or NSSI: denies  History of Psychotherapy: denies  History of Violence: denies    FAMILY HISTORY:   Paternal: abusive alcoholic father  Maternal: no psychiatric history or history of substance abuse or suicide  Children:  ADHD     SOCIAL HISTORY:   Marital Status/Relationship Status:  x 19 years  Children: 2 children, boys, 14 and 13   Resides/Housing Status: [place]   Occupation/Employment: Insurance "   Hobbies/Recreational Activities: crafting, shopping    Spirituality/Mormon: raised Jain, not practicing  Education level: HS grad   History: denies  Legal History: denies  Access to firearms: yes, locked in a safe,  is a gregory     SUBSTANCE USE HISTORY:  Caffeine: coffee 1 large cup in AM  Tobacco: denies  Alcohol: occasionally  Other Substances: denies  Rehab: denies  Detoxes:  denies      MEDICATIONS:    Current Outpatient Medications:     atorvastatin (LIPITOR) 10 MG tablet, Take 1 tablet (10 mg total) by mouth once daily., Disp: 90 tablet, Rfl: 3    benzonatate (TESSALON) 200 MG capsule, Take 1 capsule (200 mg total) by mouth 3 (three) times daily as needed for Cough., Disp: 30 capsule, Rfl: 0    cetirizine (ZYRTEC) 10 MG tablet, Take 10 mg by mouth once daily., Disp: , Rfl:     doxepin (SINEQUAN) 10 MG capsule, Take 1 capsule (10 mg total) by mouth every evening. (Patient not taking: Reported on 11/29/2023), Disp: 30 capsule, Rfl: 2    INTRAUTERINE DEVICE, IUD, IU, by Intrauterine route., Disp: , Rfl:     levonorgestreL (MIRENA) 20 mcg/24 hours (7 yrs) 52 mg IUD, Mirena 20 mcg/24 hours (7 yrs) 52 mg intrauterine device  Take by intrauterine route., Disp: , Rfl:     lisdexamfetamine (VYVANSE) 30 MG capsule, Take 1 capsule (30 mg total) by mouth every morning. (Patient not taking: Reported on 11/29/2023), Disp: 30 capsule, Rfl: 0    venlafaxine (EFFEXOR-XR) 150 MG Cp24, Take 1 capsule (150 mg total) by mouth once daily. (Patient not taking: Reported on 11/29/2023), Disp: 90 capsule, Rfl: 0  No current facility-administered medications for this visit.    ALLERGIES:  Review of patient's allergies indicates:   Allergen Reactions    Paxil [paroxetine hcl] Nausea Only       EXAM:  Constitutional  Vitals:  Most recent vital signs were reviewed.   Last 3 sets of VS:      10/18/2023     9:09 AM 10/25/2023     3:38 PM 11/29/2023     5:03 PM   Vitals - 1 value per visit   SYSTOLIC 133 132 124  "  DIASTOLIC 86 89 87   Pulse 118  100   Temp   98.8 °F (37.1 °C)   Resp   18   SPO2   98 %   Weight (lb) 193.01 191.14 191   Weight (kg) 87.55 86.7 86.637   Height 5' 5" (1.651 m)  5' 5" (1.651 m)   BMI (Calculated) 32.1  31.8   Pain Score Zero Zero       General:  unremarkable, age appropriate     Musculoskeletal  Muscle Strength/Tone:  No tremors appreciated   Gait & Station:  Unable to assess, Pt seated during virtual visit     Psychiatric  Speech:  no latency; no press   Mood & Affect:  euthymic  congruent and appropriate   Thought Process:  normal and logical   Associations:  intact   Thought Content:  normal, no suicidality, no homicidality, delusions, or paranoia   Insight:  intact   Judgement: behavior is adequate to circumstances   Orientation:  grossly intact   Memory: intact for content of interview   Language: grossly intact   Attention Span & Concentration:  able to focus   Fund of Knowledge:  intact and appropriate to age and level of education     SUICIDE RISK ASSESSMENT:  Protective factors: age, gender, no prior attempts, no prior hospitalizations, no family h/o attempts, no ongoing substance abuse, no psychosis, , has children, denies SI/intent/plan, seeking treatment, access to treatment, future oriented, good primary support  Risks:  Ongoing depression anxiety, history of ADHD, access to firearms  Patient is a low immediate and long-term risk considering risk factors.      RELEVANT LABS/STUDIES:    Lab Results   Component Value Date    WBC 6.29 10/06/2023    HGB 13.9 10/06/2023    HCT 42.1 10/06/2023    MCV 89 10/06/2023     10/06/2023     BMP  Lab Results   Component Value Date     10/06/2023    K 4.6 10/06/2023     10/06/2023    CO2 24 10/06/2023    BUN 10 10/06/2023    CREATININE 0.7 10/06/2023    CALCIUM 9.4 10/06/2023    ANIONGAP 11 10/06/2023    ESTGFRAFRICA >60.0 03/05/2021    EGFRNONAA >60.0 03/05/2021     Lab Results   Component Value Date    ALT 32 10/06/2023 "    AST 22 10/06/2023    ALKPHOS 62 10/06/2023    BILITOT 0.7 10/06/2023     Lab Results   Component Value Date    TSH 1.507 10/06/2023     Lab Results   Component Value Date    HGBA1C 5.2 10/06/2023     Lab Results   Component Value Date    CHOL 277 (H) 10/06/2023    TRIG 193 (H) 10/06/2023    HDL 60 10/06/2023    LDLCALC 178.4 (H) 10/06/2023    CHOLHDL 21.7 10/06/2023    TOTALCHOLEST 4.6 10/06/2023       IMPRESSION:    Christel Alejo is a 38 y.o. female with history of MDD, KAYODE, SAD, ADHD, insomnia, who presents for follow up appointment.    Status/Progress: Based on the examination today, the patient's problem(s) is/are improved and adequately but not ideally controlled.  New problems have not been presented today.   Co-morbidities are complicating management of the primary condition.  There are no active rule-out diagnoses for this patient at this time.     Risk Parameters:  Patient reports no suicidal ideation  Patient reports no homicidal ideation  Patient reports no self-injurious behavior  Patient reports no violent behavior    DIAGNOSES:    ICD-10-CM ICD-9-CM   1. Major depressive disorder, recurrent episode, moderate  F33.1 296.32   2. KAYODE (generalized anxiety disorder)  F41.1 300.02   3. Attention deficit hyperactivity disorder (ADHD), combined type  F90.2 314.01   4. Social anxiety disorder  F40.10 300.23   5. Primary insomnia  F51.01 307.42       PLAN:  Continue venlafaxine  mg daily for mood and anxiety  Discontinue trazodone due to lack of effect   Start doxepin 10 mg q.h.s. p.r.n. insomnia  Start lisdexamfetamine 30 mg q.a.m. for ADHD; patient was able to tolerate this dose previously with good control of symptoms of ADHD and no increase in anxiety  Offered referral for individual psychotherapy.    RETURN TO CLINIC:   1 month      Jaz Koch, KARL, PMHNP-BC        45 minutes of total time spent on the encounter, which includes face to face time and non-face to face time preparing to  see the patient (eg. review of tests), obtaining and/or reviewing separately obtained history, documenting clinical information in the electronic health record, independently interpreting results (not separately reported), and communicating results to the patient/family/caregiver, or care coordination (not separately reported).     At this time there are no indications the patient represents an imminent danger to either themselves or others; will continue to manage treatment in the outpatient setting.    I discussed the patient's care with the patient including benefits, alternatives, possible adverse effects of the treatment plan; including the potential for metabolic complications, major organ dysfunction, black box warnings, and contraindications. The opportunity was given for questions/clarification, and after this discussion the above treatment plan was devised through shared decision making. The patient voiced their understanding of the diagnoses and treatments listed above and agreed to the treatment plan. Follow up plan was reviewed with the patient. The patient was advised to call to report any worsening of symptoms or problems with medication.    Supportive therapy and psychoeducation provided. I discussed the importance of regular exercise, maintenance of a healthy weight, balanced diet rich in fruits/vegetables and lean protein, and avoidance of unhealthy habits like smoking and excessive alcohol intake.     Patient has been given crisis information including Suicide and Crisis Lifeline (call or text: 714). Patient also given instructions to go to the nearest ER or call 911 if unable to remain safe or if the Pt develops thoughts of harming self or others.    [place]: Reviewed today to detect potential controlled substance misuse, diversion, excessive prescribing, or multiple providers prescribing controlled substances. The patients report was deemed appropriate without new medications of  "concern prescribed by other providers.    Documentation entered by me for this encounter may have been done in part using path intelligence Direct voice recognition transcription software. Garbled syntax, mangled pronouns, and other bizarre constructions may be attributed to that software system. Although I have made an effort to ensure accuracy, "sound like" errors may exist and should be interpreted in context.    "

## 2023-11-29 NOTE — PROGRESS NOTES
"Subjective:      Patient ID: Christel Alejo is a 38 y.o. female.    Vitals:  height is 5' 5" (1.651 m) and weight is 86.6 kg (191 lb). Her temperature is 98.8 °F (37.1 °C). Her blood pressure is 124/87 and her pulse is 100. Her respiration is 18 and oxygen saturation is 98%.     Chief Complaint: Sinus Problem    38 year old female presents today with sinuses, cough, fatigue, weakness, HA, body aches, sore throat. Symptoms started 11/25/2023. Treatments at home includes Eveline [place] Cold and Flu with no relief. No known exposure to anything. Positive COVID 2021. Not COVID vaccinated. Flu vaccinated.     Sinus Problem  This is a new problem. The current episode started in the past 7 days. The problem has been gradually worsening since onset. There has been no fever. Associated symptoms include chills, congestion, coughing, diaphoresis, headaches, sinus pressure and a sore throat. Pertinent negatives include no ear pain, hoarse voice, neck pain, shortness of breath, sneezing or swollen glands. Past treatments include oral decongestants.       Constitution: Positive for chills and sweating.   HENT:  Positive for congestion, sinus pressure and sore throat. Negative for ear pain.    Neck: Negative for neck pain.   Respiratory:  Positive for cough. Negative for shortness of breath.    Allergic/Immunologic: Negative for sneezing.   Neurological:  Positive for headaches.      Objective:     Physical Exam   Constitutional: She does not appear ill. No distress.   HENT:   Head: Normocephalic and atraumatic.   Ears:   Right Ear: Tympanic membrane, external ear and ear canal normal.   Left Ear: Tympanic membrane, external ear and ear canal normal.   Mouth/Throat: Mucous membranes are moist. No oropharyngeal exudate or posterior oropharyngeal erythema. Oropharynx is clear.   Eyes: Conjunctivae are normal. Right eye exhibits no discharge. Left eye exhibits no discharge. Extraocular movement intact   Cardiovascular: Normal " "rate, regular rhythm and normal heart sounds.   No murmur heard.  Pulmonary/Chest: Effort normal and breath sounds normal. She has no wheezes. She has no rhonchi. She has no rales.   Abdominal: Normal appearance.   Musculoskeletal: Normal range of motion.         General: Normal range of motion.   Neurological: no focal deficit. She is alert.   Skin: Skin is warm, dry and not pale. jaundice  Psychiatric: Her behavior is normal. Mood, judgment and thought content normal.   Nursing note and vitals reviewed.      Assessment:     1. Influenza B    2. Cough, unspecified type        Plan:       Influenza B    Cough, unspecified type  -     SARS Coronavirus 2 Antigen, POCT Manual Read  -     POCT Influenza A/B MOLECULAR    Results for orders placed or performed in visit on 11/29/23   SARS Coronavirus 2 Antigen, POCT Manual Read   Result Value Ref Range    SARS Coronavirus 2 Antigen Negative Negative     Acceptable Yes    POCT Influenza A/B MOLECULAR   Result Value Ref Range    POC Molecular Influenza A Ag Negative Negative, Not Reported    POC Molecular Influenza B Ag Positive (A) Negative, Not Reported     Acceptable Yes         Other orders  -     benzonatate (TESSALON) 200 MG capsule; Take 1 capsule (200 mg total) by mouth 3 (three) times daily as needed for Cough.  Dispense: 30 capsule; Refill: 0  -     dexAMETHasone injection 10 mg      Patient requesting steroid injection.  Discussed benefits versus risks of injection vs oral at length with patient. Patient verbalizes risks associated with injection. Patient refused oral option and injection was given.     Flu   The Basics   Written by the doctors and editors at [facility]   What is the flu? -- The flu is an infection that can cause fever, cough, body aches, and other symptoms. The most common type of flu is the "seasonal" flu. There are different forms of seasonal flu, for example, "type A" and "type B."  All forms of the flu are caused " "by viruses. The medical term for the flu is "influenza."  What are the other types of flu? -- Besides seasonal flu, there is also the "swine" flu, which caused a worldwide outbreak ("pandemic") in 2009 and 2010, and the bird flu. Bird flu (also known as "sheila flu") is a severe form of the flu that is caused by a type of flu virus that first infected birds.  What are the most common flu symptoms? -- All forms of the flu can cause:  Fever (temperature higher than 100ºF or 37.8ºC)  Extreme tiredness  Headache or body aches  Cough  Sore throat  Runny nose  Flu symptoms can come on very suddenly.  Is the flu dangerous? -- It can be. Most people get over the flu on their own, without any lasting problems. But some people need to go to the hospital because of the flu. And some people even die from it. This is because the flu can cause a serious lung infection called pneumonia. That's why it's important to keep from getting the flu in the first place.  People at higher risk of getting very sick from the flu include:  People 65 or older  Young children (under 5 years old, and especially under 2 years old)  Pregnant women  People with certain other medical problems  If you or your child is in one of these groups, talk to a doctor or nurse. He or she can help you decide if you or your child needs treatment. In some cases, family members of a person with the flu might also need medicine to help prevent them from getting it.  Is there a test for flu? -- Yes. There are tests for the flu. In most cases, your doctor can tell if you have the flu by your symptoms. But in some cases - for example, if you are at risk for having other problems caused by the flu - your doctor might do a test for flu.  How can I protect myself from the flu? -- You can:  Wash your hands often with soap and water. The table has instructions on how to wash your hands to prevent spreading illness (table 1).  Stay away from people you know are sick  Get the " flu vaccine every year - Some years the flu vaccine is more effective than others. But even in years when it is less effective, it still helps prevent some cases of the flu. It can also help keep you from getting severely ill if you do get the flu.  What should I do if I get the flu? -- If you think you have the flu, stay home, rest, and drink plenty of fluids. You can also take acetaminophen (sample brand name: Tylenol) to relieve fever and aches.  Do not give aspirin or medicines that contain aspirin to children younger than 18. In children, aspirin can cause a serious problem called Reye syndrome.  Most people with the flu get better on their own within 1 to 2 weeks. But you should call your doctor or nurse if you:  Have trouble breathing or are short of breath  Feel pain or pressure in your chest or belly  Get suddenly dizzy  Feel confused  Have severe vomiting  Take your child to the doctor if he or she:  Starts breathing fast or has trouble breathing  Starts to turn blue or purple  Is not drinking enough fluids  Will not wake up or will not interact with you  Is so unhappy that he or she does not want to be held  Gets better from the flu but then gets sick again with a fever or cough  Has a fever with a rash  If you decide to go to a walk-in clinic or a hospital because of the flu, tell someone right away why you are there. The staff might ask you to wear a mask or to wait someplace where you are less likely to spread your infection.  Whether or not you see a doctor or nurse, you should stay home while you are sick with the flu, or keep your child home if he or she is sick. Do not go to work or school until your fever has been gone for at least 24 hours, without taking medicine such as acetaminophen. If you work with patients, such as in a hospital or clinic, you might need to stay home longer if you are still coughing. Also, always cover your mouth and nose with the inside of your elbow when you cough or  sneeze.  Can the flu be treated? -- Yes, people with the flu can get medicines called antiviral medicines. These medicines can help people avoid some of the problems caused by the flu. Not every person with the flu needs an antiviral medicine, but some people do. Your doctor or nurse will decide if you need an antiviral medicine. Antibiotics do not work on the flu.  What if I am pregnant? -- The flu can be very dangerous for pregnant women. If you are pregnant, it is very important that you get the flu vaccine. You should also avoid taking care of anyone who has the flu.  If you are pregnant, call your doctor or nurse right away if:  You might have been near someone with the flu.  You think you might be coming down with the flu. In pregnant women, the symptoms of the flu can get worse very quickly. The flu can even cause trouble breathing or lead to death of the woman or her baby. That is why it is so important that you talk to doctor or nurse as soon as you notice any of the flu symptoms listed above. You will need an antiviral medicine if you are pregnant and have the flu.  All topics are updated as new evidence becomes available and our peer review process is complete.  This topic retrieved from [employer] on: Sep 21, 2021.  Topic 38544 Version 15.0  Release: 29.4.2 - C29.263  © 2021 UpToDate, Inc. and/or its affiliates. All rights reserved.  table 1: Hand washing to prevent spreading illness  Wet your hands and put soap on them    Rub your hands together for at least 20 seconds. Make sure to clean your wrists, fingernails, and in between your fingers.    Rinse your hands    Dry your hands with a paper towel that you can throw away    If you are not near a sink, you can use a hand gel to clean your hands. The gels with at least 60 percent alcohol work the best. But it is better to wash with soap and water if you can.  Graphic 764115 Version 3.0  Consumer Information Use and Disclaimer   This information is not  specific medical advice and does not replace information you receive from your health care provider. This is only a brief summary of general information. It does NOT include all information about conditions, illnesses, injuries, tests, procedures, treatments, therapies, discharge instructions or life-style choices that may apply to you. You must talk with your health care provider for complete information about your health and treatment options. This information should not be used to decide whether or not to accept your health care provider's advice, instructions or recommendations. Only your health care provider has the knowledge and training to provide advice that is right for you. The use of this information is governed by the [employer] End User License Agreement, available at https://[email or web address]/en/[email or web address]/[email or web address].The use of [employer] content is governed by the [employer] Terms of Use. ©2021 UpToDate, Inc. All rights reserved.  Copyright   © 2021 UpToDate, Inc. and/or its affiliates. All rights reserved.

## 2024-01-24 ENCOUNTER — OFFICE VISIT (OUTPATIENT)
Dept: URGENT CARE | Facility: CLINIC | Age: 39
End: 2024-01-24
Payer: COMMERCIAL

## 2024-01-24 VITALS
SYSTOLIC BLOOD PRESSURE: 138 MMHG | OXYGEN SATURATION: 97 % | HEIGHT: 65 IN | WEIGHT: 191 LBS | TEMPERATURE: 99 F | RESPIRATION RATE: 18 BRPM | BODY MASS INDEX: 31.82 KG/M2 | HEART RATE: 96 BPM | DIASTOLIC BLOOD PRESSURE: 85 MMHG

## 2024-01-24 DIAGNOSIS — T78.40XA ALLERGIC REACTION, INITIAL ENCOUNTER: Primary | ICD-10-CM

## 2024-01-24 DIAGNOSIS — R05.1 ACUTE COUGH: ICD-10-CM

## 2024-01-24 PROCEDURE — 99214 OFFICE O/P EST MOD 30 MIN: CPT | Mod: S$GLB,,, | Performed by: NURSE PRACTITIONER

## 2024-01-24 PROCEDURE — 71046 X-RAY EXAM CHEST 2 VIEWS: CPT | Mod: S$GLB,,, | Performed by: RADIOLOGY

## 2024-01-24 RX ORDER — FAMOTIDINE 20 MG/1
20 TABLET, FILM COATED ORAL
Status: COMPLETED | OUTPATIENT
Start: 2024-01-24 | End: 2024-01-24

## 2024-01-24 RX ORDER — EPINEPHRINE 0.3 MG/.3ML
1 INJECTION SUBCUTANEOUS ONCE
Qty: 0.3 ML | Refills: 0 | Status: SHIPPED | OUTPATIENT
Start: 2024-01-24 | End: 2024-01-24

## 2024-01-24 RX ADMIN — FAMOTIDINE 20 MG: 20 TABLET, FILM COATED ORAL at 07:01

## 2024-01-25 NOTE — PATIENT INSTRUCTIONS
-Pepcid.  -Continue Zyrtec.  -Chest xray is normal today.  -Stop taking medrol and azithromycin.  -Epi Pen for emergency use only.   -If symptoms worsen go to the ER.

## 2024-01-31 DIAGNOSIS — E78.5 HYPERLIPIDEMIA, UNSPECIFIED HYPERLIPIDEMIA TYPE: ICD-10-CM

## 2024-01-31 RX ORDER — LISDEXAMFETAMINE DIMESYLATE 30 MG/1
30 CAPSULE ORAL EVERY MORNING
Qty: 30 CAPSULE | Refills: 0 | Status: CANCELLED | OUTPATIENT
Start: 2024-01-31 | End: 2024-03-01

## 2024-01-31 RX ORDER — DOXEPIN HYDROCHLORIDE 10 MG/1
10 CAPSULE ORAL NIGHTLY
Qty: 30 CAPSULE | Refills: 2 | Status: CANCELLED | OUTPATIENT
Start: 2024-01-31 | End: 2025-01-30

## 2024-01-31 RX ORDER — VENLAFAXINE HYDROCHLORIDE 150 MG/1
150 CAPSULE, EXTENDED RELEASE ORAL DAILY
Qty: 90 CAPSULE | Refills: 0 | Status: SHIPPED | OUTPATIENT
Start: 2024-01-31 | End: 2024-06-03

## 2024-01-31 NOTE — TELEPHONE ENCOUNTER
No care due was identified.  [facility] Embedded Care Due Messages. Reference number: [id number].   1/31/2024 9:27:26 AM CST

## 2024-02-09 ENCOUNTER — OFFICE VISIT (OUTPATIENT)
Dept: PSYCHIATRY | Facility: CLINIC | Age: 39
End: 2024-02-09
Payer: COMMERCIAL

## 2024-02-09 DIAGNOSIS — F40.10 SOCIAL ANXIETY DISORDER: ICD-10-CM

## 2024-02-09 DIAGNOSIS — F41.1 GAD (GENERALIZED ANXIETY DISORDER): ICD-10-CM

## 2024-02-09 DIAGNOSIS — F33.41 RECURRENT MAJOR DEPRESSIVE DISORDER, IN PARTIAL REMISSION: Primary | ICD-10-CM

## 2024-02-09 DIAGNOSIS — F51.01 PRIMARY INSOMNIA: ICD-10-CM

## 2024-02-09 DIAGNOSIS — F90.2 ATTENTION DEFICIT HYPERACTIVITY DISORDER (ADHD), COMBINED TYPE: ICD-10-CM

## 2024-02-09 PROCEDURE — 99999 PR PBB SHADOW E&M-EST. PATIENT-LVL III: CPT | Mod: PBBFAC,,,

## 2024-02-09 PROCEDURE — G2211 COMPLEX E/M VISIT ADD ON: HCPCS | Mod: S$GLB,,,

## 2024-02-09 PROCEDURE — 1159F MED LIST DOCD IN RCRD: CPT | Mod: CPTII,S$GLB,,

## 2024-02-09 PROCEDURE — 1160F RVW MEDS BY RX/DR IN RCRD: CPT | Mod: CPTII,S$GLB,,

## 2024-02-09 PROCEDURE — 99215 OFFICE O/P EST HI 40 MIN: CPT | Mod: S$GLB,,,

## 2024-02-09 RX ORDER — LISDEXAMFETAMINE DIMESYLATE 30 MG/1
30 CAPSULE ORAL EVERY MORNING
Qty: 30 CAPSULE | Refills: 0 | Status: SHIPPED | OUTPATIENT
Start: 2024-04-08 | End: 2024-03-11

## 2024-02-09 RX ORDER — DOXEPIN HYDROCHLORIDE 10 MG/1
10 CAPSULE ORAL NIGHTLY
Qty: 30 CAPSULE | Refills: 2 | Status: SHIPPED | OUTPATIENT
Start: 2024-02-09 | End: 2025-02-08

## 2024-02-09 RX ORDER — LISDEXAMFETAMINE DIMESYLATE 30 MG/1
30 CAPSULE ORAL EVERY MORNING
Qty: 30 CAPSULE | Refills: 0 | Status: SHIPPED | OUTPATIENT
Start: 2024-03-08 | End: 2024-03-11

## 2024-02-09 RX ORDER — LISDEXAMFETAMINE DIMESYLATE 30 MG/1
30 CAPSULE ORAL EVERY MORNING
Qty: 30 CAPSULE | Refills: 0 | Status: SHIPPED | OUTPATIENT
Start: 2024-02-09 | End: 2024-03-10 | Stop reason: SDUPTHER

## 2024-02-09 NOTE — PROGRESS NOTES
"OUTPATIENT PSYCHIATRY FOLLOW UP VISIT    Encounter Date: 2/9/2024    Clinical Status of Patient:  Outpatient (Virtual)  The patient location is: [address] [place] [address]  The patient phone number is: [phone]   Visit type: Virtual visit with synchronous audio and video  Each patient to whom he or she provides medical services by telemedicine is:  (1) informed of the relationship between the practitioner and patient and the respective role of any other health care provider with respect to management of the patient; and (2) notified that he or she may decline to receive medical services by telemedicine and may withdraw from such care at any time.    Chief Complaint:  Christel Alejo is a 38 y.o. female who presents today for follow-up.  Met with patient.      HISTORY OF PRESENTING ILLNESS:  Christel Alejo is a 38 y.o. female with history of MDD, KAYODE, SAD, ADHD, insomnia who presents for follow up appointment.      INITIAL HPI:  Patient reports a long history of anxiety and states I have always had anxiety." She reports anxiety worsened after she had children.  She was hospitalized with COVID 2 years ago and states she "hasn't recovered in terms of work, cognitive ability. I can't focus. I'm losing time management ability. I have brain fog. It's affecting my career." Patient was most recently on Viibryd which stopped working several months ago at which time she began struggling with anxiety and depression.  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  She also reports becoming flushed and anxious when interacting with people. She reports insomnia despite taking Vistaril 50 mg q.h.s..     She was diagnosed with ADHD by Dr Bhagat approximately 1 year ago, and notes her children have ADHD as well. Was taking Vyvanse 50 mg, which improved focus and concentration, but increased anxiety.  She did try taking Vyvanse 30 which was effective and caused " "less anxiety     11/29/2023: Venlafaxine ER 75 mg daily was started 1 month ago and increased to 150 mg daily 2 weeks ago.  Patient reports improvement in mood as well as generalized anxiety.  She does report worsening fatigue but has been ill with flu-like symptoms for the last several days.  She reports continued insomnia noting trazodone has not improved sleep.  She repeatedly wakes up around 3:00 a.m. and experiences difficulty returning to sleep.   She reports her main concern this time is her ability to focus and concentrate and states she is "struggling trying to keep up with work."    Plan at last appointment:  Continue venlafaxine  mg daily for mood and anxiety  Discontinue trazodone due to lack of effect   Start doxepin 10 mg q.h.s. p.r.n. insomnia  Start lisdexamfetamine 30 mg q.a.m. for ADHD; patient was able to tolerate this dose previously with good control of symptoms of ADHD and no increase in anxiety  Offered referral for individual psychotherapy.    Psychotropic medication history:   Zoloft 50 mg daily, Paxil (nausea), viibryd 40 mg daily  xanax 0.25 mg p.r.n. anxiety (effective, hasn't needed)  Trazodone (effective)  Not escitalopram, prozac, effexor, cymbalta, fluoxetine, bupropion       INTERVAL HISTORY:    Mood has been stable and anxiety is well controlled.  Patient reports she forgot to bring venlafaxine on a 4 day work trip and experienced fatigue and irritability which resolved once she restarted venlafaxine.    Pt reports significant improvement in symptoms of ADHD-IT after starting lisdexamfetamine. Has been able to concentrate and focus while at work and is more productive.  She denies any increase in anxiety with starting lisdexamfetamine.  She did take lisdexamfetamine 50 mg daily at 1 point in the past but found this to be over stimulating causing increase in anxiety. Pt denies cardiovascular events including increased heart rate or increased blood pressure, palpitations, chest " pain, dizziness, lightheadedness, or syncopal episodes. Pt denies A/V hallucinations or behavioral effects. Pt denies anorexia, decreased appetite, xerostomia, headache, insomnia, or digital changes.    Patient reports she forgot to  doxepin from the pharmacy after our last visit and has continued to take trazodone for insomnia.  She reports she continues to experience insomnia.  She is unable to take a whole trazodone 50 mg tablet has this causes daytime sedation.  When she takes less than 1 tablet she experiences awakening during the night.      No interval episodes with symptoms consistent with eric or hypomania.  Denied interval or current suicidal/homicidal thoughts, intent, or plan or NSSI.  Denied other questions and concerns.  Patient reports feeling stable and wishing to continue current management unchanged.    Medication side effects: None  Medication adherence: yes    PSYCHIATRIC REVIEW OF SYSTEMS:  Is patient experiencing or having changes in:  Trouble with sleep:  insomnia, can fall asleep, continues to wake up at 3 am, no improvement with trazodone, difficulty returning to sleep, racing thoughts  Appetite changes:  no  Weight changes:  no  Lack of energy: improving  Anhedonia: improving, has been taking care of plants and had more interest  Somatic symptoms:  no  Anxiety/panic:  continues to improve  Irritability: no  Guilty/hopeless:  no  Concentration: no  Racing thoughts: no  Impulsive behaviors: no  Paranoia/AVH: no  Self-injurious behavior/risky behavior:  no  Any drugs:  no  Alcohol:  no      MEDICAL REVIEW OF SYSTEMS:   Pain: Denies any significant chronic or acute pain.  Constitutional: Denies fever or change in appetite.  Cardiovascular: Denies chest pain or exertional dyspnea.  Respiratory: Denies cough or orthopnea.   GI: Denies abdominal pain, N/V  Neurological: Denies tremor, seizure, or focal weakness.  Psychiatric: See HPI above.    PAST PSYCHIATRIC, MEDICAL, AND SOCIAL HISTORY  REVIEWED  The patient's past medical, family and social history have been reviewed and updated as appropriate within the electronic medical record - see encounter notes.    PAST MEDICAL HISTORY:   Past Medical History:   Diagnosis Date    Allergy     Anxiety     Depression     History of cervical dysplasia     s/p conization followers with Dr Vargas    S/P LASIK surgery of both eyes     Head trauma/Loss of consciousness: denies  Seizures: denies     PAST PSYCHIATRIC HISTORY:  Psychiatric Care (current & past):  Dr Bhagat for treatment of ADHD, patient's OBGYN prescribed antidepressants in the past  Previous Psychiatric Diagnoses:  ADHD, KAYODE, MDD  Previous Psychiatric Hospitalizations: denies  Previous SI/HI:  denies  Previous Suicide Attempts or NSSI: denies  History of Psychotherapy: denies  History of Violence: denies    FAMILY HISTORY:   Paternal: abusive alcoholic father  Maternal: no psychiatric history or history of substance abuse or suicide  Children:  ADHD     SOCIAL HISTORY:   Marital Status/Relationship Status:  x 19 years  Children: 2 children, boys, 14 and 13   Resides/Housing Status: [place]   Occupation/Employment: [employer]  Hobbies/Recreational Activities: crafting, shopping    Spirituality/Temple: raised Oriental orthodox, not practicing  Education level: HS grad   History: denies  Legal History: denies  Access to firearms: yes, locked in a safe,  is a gregory     SUBSTANCE USE HISTORY:  Caffeine: coffee 1 large cup in AM  Tobacco: denies  Alcohol: occasionally  Other Substances: denies  Rehab: denies  Detoxes:  denies      MEDICATIONS:    Current Outpatient Medications:     atorvastatin (LIPITOR) 10 MG tablet, Take 1 tablet (10 mg total) by mouth once daily., Disp: 90 tablet, Rfl: 3    cetirizine (ZYRTEC) 10 MG tablet, Take 10 mg by mouth once daily., Disp: , Rfl:     doxepin (SINEQUAN) 10 MG capsule, Take 1 capsule (10 mg total) by mouth every evening., Disp: 30 capsule,  "Rfl: 2    EPINEPHrine (EPIPEN) 0.3 mg/0.3 mL AtIn, Inject 0.3 mLs (0.3 mg total) into the muscle once. for 1 dose, Disp: 0.3 mL, Rfl: 0    INTRAUTERINE DEVICE, IUD, IU, by Intrauterine route., Disp: , Rfl:     levonorgestreL (MIRENA) 20 mcg/24 hours (7 yrs) 52 mg IUD, Mirena 20 mcg/24 hours (7 yrs) 52 mg intrauterine device  Take by intrauterine route., Disp: , Rfl:     lisdexamfetamine (VYVANSE) 30 MG capsule, Take 1 capsule (30 mg total) by mouth every morning., Disp: 30 capsule, Rfl: 0    [START ON 3/8/2024] lisdexamfetamine (VYVANSE) 30 MG capsule, Take 1 capsule (30 mg total) by mouth every morning., Disp: 30 capsule, Rfl: 0    [START ON 4/8/2024] lisdexamfetamine (VYVANSE) 30 MG capsule, Take 1 capsule (30 mg total) by mouth every morning., Disp: 30 capsule, Rfl: 0    venlafaxine (EFFEXOR-XR) 150 MG Cp24, Take 1 capsule (150 mg total) by mouth once daily., Disp: 90 capsule, Rfl: 0    ALLERGIES:  Review of patient's allergies indicates:   Allergen Reactions    Paxil [paroxetine hcl] Nausea Only       EXAM:  Constitutional  Vitals:  Most recent vital signs were reviewed.   Last 3 sets of VS:      10/25/2023     3:38 PM 11/29/2023     5:03 PM 1/24/2024     6:25 PM   Vitals - 1 value per visit   SYSTOLIC 132 124 138   DIASTOLIC 89 87 85   Pulse  100 96   Temp  98.8 °F (37.1 °C) 98.7 °F (37.1 °C)   Resp  18 18   SPO2  98 % 97 %   Weight (lb) 191.14 191 191   Weight (kg) 86.7 86.637 86.637   Height  5' 5" (1.651 m) 5' 5" (1.651 m)   BMI (Calculated)  31.8 31.8   Pain Score Zero  Zero      General:  unremarkable, age appropriate     Musculoskeletal  Muscle Strength/Tone:  no tremor or abnormal movements   Gait & Station:  Steady, non-ataxic     Psychiatric  Speech:  no latency; no press   Mood & Affect:  euthymic  congruent and appropriate   Thought Process:  normal and logical   Associations:  intact   Thought Content:  normal, no suicidality, no homicidality, delusions, or paranoia   Insight:  intact   Judgement: " behavior is adequate to circumstances   Orientation:  grossly intact   Memory: intact for content of interview   Language: grossly intact   Attention Span & Concentration:  able to focus   Fund of Knowledge:  intact and appropriate to age and level of education     SUICIDE RISK ASSESSMENT:  Protective factors: age, gender, no prior attempts, no prior hospitalizations, no family h/o attempts, no ongoing substance abuse, no psychosis, , has children, denies SI/intent/plan, seeking treatment, access to treatment, future oriented, good primary support  Risks:  Ongoing depression anxiety, history of ADHD, access to firearms  Patient is a low immediate and long-term risk considering risk factors.      RELEVANT LABS/STUDIES:    Lab Results   Component Value Date    WBC 6.29 10/06/2023    HGB 13.9 10/06/2023    HCT 42.1 10/06/2023    MCV 89 10/06/2023     10/06/2023     BMP  Lab Results   Component Value Date     10/06/2023    K 4.6 10/06/2023     10/06/2023    CO2 24 10/06/2023    BUN 10 10/06/2023    CREATININE 0.7 10/06/2023    CALCIUM 9.4 10/06/2023    ANIONGAP 11 10/06/2023    ESTGFRAFRICA >60.0 03/05/2021    EGFRNONAA >60.0 03/05/2021     Lab Results   Component Value Date    ALT 32 10/06/2023    AST 22 10/06/2023    ALKPHOS 62 10/06/2023    BILITOT 0.7 10/06/2023     Lab Results   Component Value Date    TSH 1.507 10/06/2023     Lab Results   Component Value Date    HGBA1C 5.2 10/06/2023     Lab Results   Component Value Date    CHOL 277 (H) 10/06/2023    TRIG 193 (H) 10/06/2023    HDL 60 10/06/2023    LDLCALC 178.4 (H) 10/06/2023    CHOLHDL 21.7 10/06/2023    TOTALCHOLEST 4.6 10/06/2023       IMPRESSION:    Christel Alejo is a 38 y.o. female with history of MDD, KAYODE, SAD, ADHD, insomnia, who presents for follow up appointment.    Status/Progress: Based on the examination today, the patient's problem(s) is/are improved and well controlled.  New problems have not been presented today.    Co-morbidities are complicating management of the primary condition.  There are no active rule-out diagnoses for this patient at this time.     Risk Parameters:  Patient reports no suicidal ideation  Patient reports no homicidal ideation  Patient reports no self-injurious behavior  Patient reports no violent behavior    DIAGNOSES:    ICD-10-CM ICD-9-CM   1. Recurrent major depressive disorder, in partial remission  F33.41 296.35   2. KAYODE (generalized anxiety disorder)  F41.1 300.02   3. Attention deficit hyperactivity disorder (ADHD), combined type  F90.2 314.01   4. Social anxiety disorder  F40.10 300.23   5. Primary insomnia  F51.01 307.42         PLAN:  Continue venlafaxine  mg daily for mood and anxiety  Continue lisdexamfetamine 30 mg q.a.m. for ADHD  Discontinue trazodone due to lack of effect   Start doxepin 10 mg q.h.s. p.r.n. insomnia (patient forgot to  from the pharmacy after our last visit)  Offered referral for individual psychotherapy.    RETURN TO CLINIC:   1 month      KARL Bergman, PMHNP-BC    42 minutes of total time spent on the encounter, which includes face to face time and non-face to face time preparing to see the patient (eg. review of tests), obtaining and/or reviewing separately obtained history, documenting clinical information in the electronic health record, independently interpreting results (not separately reported), and communicating results to the patient/family/caregiver, or care coordination (not separately reported).     Visit today included managing the longitudinal care of the patient due to the serious and/or complex managed problem(s) MDD, KAYODE, ADHD, social anxiety disorder, insomnia.    At this time there are no indications the patient represents an imminent danger to either themselves or others; will continue to manage treatment in the outpatient setting.    I discussed the patient's care with the patient including benefits, alternatives, possible  "adverse effects of the treatment plan; including the potential for metabolic complications, major organ dysfunction, black box warnings, and contraindications. The opportunity was given for questions/clarification, and after this discussion the above treatment plan was devised through shared decision making. The patient voiced their understanding of the diagnoses and treatments listed above and agreed to the treatment plan. Follow up plan was reviewed with the patient. The patient was advised to call to report any worsening of symptoms or problems with medication.    Supportive therapy and psychoeducation provided. I discussed the importance of regular exercise, maintenance of a healthy weight, balanced diet rich in fruits/vegetables and lean protein, and avoidance of unhealthy habits like smoking and excessive alcohol intake.     Patient has been given crisis information including Suicide and Crisis Lifeline (call or text: 708). Patient also given instructions to go to the nearest ER or call 911 if unable to remain safe or if the Pt develops thoughts of harming self or others.    [facility]: Reviewed today to detect potential controlled substance misuse, diversion, excessive prescribing, or multiple providers prescribing controlled substances. The patients report was deemed appropriate without new medications of concern prescribed by other providers.    Documentation entered by me for this encounter may have been done in part using [employer] Direct voice recognition transcription software. Garbled syntax, mangled pronouns, and other bizarre constructions may be attributed to that software system. Although I have made an effort to ensure accuracy, "sound like" errors may exist and should be interpreted in context.    "

## 2024-03-07 RX ORDER — ATORVASTATIN CALCIUM 10 MG/1
10 TABLET, FILM COATED ORAL DAILY
Qty: 90 TABLET | Refills: 3 | Status: SHIPPED | OUTPATIENT
Start: 2024-03-07 | End: 2025-03-07

## 2024-03-11 RX ORDER — LISDEXAMFETAMINE DIMESYLATE 30 MG/1
30 CAPSULE ORAL EVERY MORNING
Qty: 30 CAPSULE | Refills: 0 | Status: SHIPPED | OUTPATIENT
Start: 2024-03-11 | End: 2024-04-22 | Stop reason: SDUPTHER

## 2024-04-02 ENCOUNTER — OFFICE VISIT (OUTPATIENT)
Dept: URGENT CARE | Facility: CLINIC | Age: 39
End: 2024-04-02
Payer: COMMERCIAL

## 2024-04-02 VITALS
SYSTOLIC BLOOD PRESSURE: 131 MMHG | TEMPERATURE: 99 F | HEART RATE: 80 BPM | RESPIRATION RATE: 20 BRPM | BODY MASS INDEX: 31.82 KG/M2 | HEIGHT: 65 IN | OXYGEN SATURATION: 98 % | WEIGHT: 191 LBS | DIASTOLIC BLOOD PRESSURE: 92 MMHG

## 2024-04-02 DIAGNOSIS — R06.02 SOB (SHORTNESS OF BREATH): ICD-10-CM

## 2024-04-02 DIAGNOSIS — R06.2 WHEEZING: ICD-10-CM

## 2024-04-02 DIAGNOSIS — R05.1 ACUTE COUGH: ICD-10-CM

## 2024-04-02 DIAGNOSIS — H66.002 NON-RECURRENT ACUTE SUPPURATIVE OTITIS MEDIA OF LEFT EAR WITHOUT SPONTANEOUS RUPTURE OF TYMPANIC MEMBRANE: Primary | ICD-10-CM

## 2024-04-02 PROCEDURE — 71046 X-RAY EXAM CHEST 2 VIEWS: CPT | Mod: S$GLB,,, | Performed by: RADIOLOGY

## 2024-04-02 PROCEDURE — 99213 OFFICE O/P EST LOW 20 MIN: CPT | Mod: S$GLB,,, | Performed by: NURSE PRACTITIONER

## 2024-04-02 RX ORDER — PROMETHAZINE HYDROCHLORIDE AND DEXTROMETHORPHAN HYDROBROMIDE 6.25; 15 MG/5ML; MG/5ML
5 SYRUP ORAL NIGHTLY PRN
Qty: 118 ML | Refills: 0 | Status: SHIPPED | OUTPATIENT
Start: 2024-04-02

## 2024-04-02 RX ORDER — AMOXICILLIN 875 MG/1
875 TABLET, FILM COATED ORAL EVERY 12 HOURS
Qty: 14 TABLET | Refills: 0 | Status: SHIPPED | OUTPATIENT
Start: 2024-04-02 | End: 2024-04-09

## 2024-04-02 NOTE — LETTER
April 2, 2024      [facility]  [address] , [address]  [facility] [address]  Phone: [phone]  Fax: [phone]       Patient: Christel Alejo   YOB: 1985  Date of Visit: 04/02/2024    To Whom It May Concern:    Cee Alejo  was at [facility] on 04/02/2024. The patient may return to work/school on 4/3/2024 with no restrictions. If you have any questions or concerns, or if I can be of further assistance, please do not hesitate to contact me.    Sincerely,    Gómez Soto NP

## 2024-04-02 NOTE — PROGRESS NOTES
"Subjective:      Patient ID: Christel Alejo is a 38 y.o. female.    Vitals:  height is 5' 5" (1.651 m) and weight is 86.6 kg (191 lb). Her oral temperature is 98.6 °F (37 °C). Her blood pressure is 131/92 (abnormal) and her pulse is 80. Her respiration is 20 and oxygen saturation is 98%.     Chief Complaint: Cough    Pt reports to clinic with cough. Pt experiencing nasal congestion, chest congestion, SOB, getting winded, fatigue, sinus pressure, sore throat-resolved, fever, and sweats onset Monday last week. Mucinex cold and flu for symptoms at home. Denies pain,denies exposure, denies testing.       Cough  This is a new problem. The current episode started in the past 7 days. The problem has been unchanged. The problem occurs constantly. The cough is Productive of sputum. Associated symptoms include a fever, myalgias, nasal congestion, postnasal drip, a sore throat and sweats. Pertinent negatives include no chest pain, chills, ear pain or rash. Nothing aggravates the symptoms. She has tried OTC cough suppressant for the symptoms. The treatment provided no relief.       Constitution: Positive for fever. Negative for chills, sweating and fatigue.   HENT:  Positive for postnasal drip and sore throat. Negative for ear pain, facial swelling and congestion.    Neck: Negative for painful lymph nodes.   Cardiovascular: Negative.  Negative for chest trauma, chest pain and sob on exertion.   Eyes: Negative.  Negative for eye itching and eye pain.   Respiratory:  Positive for cough. Negative for chest tightness and asthma.    Gastrointestinal: Negative.  Negative for nausea, vomiting and diarrhea.   Endocrine: negative. cold intolerance and excessive thirst.   Genitourinary: Negative.  Negative for dysuria, frequency, urgency and hematuria.   Musculoskeletal:  Positive for muscle ache. Negative for pain, trauma and joint pain.   Skin: Negative.  Negative for rash, wound and hives.   Allergic/Immunologic: Negative.  " Negative for eczema, asthma, hives and itching.   Neurological: Negative.  Negative for disorientation and altered mental status.   Hematologic/Lymphatic: Negative.  Negative for swollen lymph nodes.   Psychiatric/Behavioral: Negative.  Negative for altered mental status, disorientation and confusion.       Objective:     Physical Exam   Constitutional: She is oriented to person, place, and time. She appears well-developed. She is cooperative.  Non-toxic appearance. She does not appear ill. No distress.   HENT:   Head: Normocephalic and atraumatic.   Ears:   Right Ear: Hearing, tympanic membrane, external ear and ear canal normal. impacted cerumen  Left Ear: Hearing, external ear and ear canal normal. There is swelling and tenderness. Tympanic membrane is injected and erythematous. impacted cerumen  Nose: Nose normal. No mucosal edema, rhinorrhea, nasal deformity or congestion. No epistaxis. Right sinus exhibits no maxillary sinus tenderness and no frontal sinus tenderness. Left sinus exhibits no maxillary sinus tenderness and no frontal sinus tenderness.   Mouth/Throat: Uvula is midline, oropharynx is clear and moist and mucous membranes are normal. No trismus in the jaw. Normal dentition. No uvula swelling. No oropharyngeal exudate, posterior oropharyngeal edema, posterior oropharyngeal erythema, tonsillar abscesses or cobblestoning. Tonsils are 0 on the right. Tonsils are 0 on the left. No tonsillar exudate.   Eyes: Conjunctivae and lids are normal. No scleral icterus.   Neck: Trachea normal and phonation normal. Neck supple. No edema present. No erythema present. No neck rigidity present.   Cardiovascular: Normal rate, regular rhythm, normal heart sounds and normal pulses.   Pulmonary/Chest: Effort normal and breath sounds normal. No stridor. No respiratory distress. She has no decreased breath sounds. She has no wheezes. She has no rhonchi. She has no rales. She exhibits no tenderness.   Abdominal: Normal  appearance. There is no abdominal tenderness.   Musculoskeletal: Normal range of motion.         General: No deformity. Normal range of motion.   Lymphadenopathy:     She has no cervical adenopathy.   Neurological: no focal deficit. She is alert, oriented to person, place, and time and at baseline. She exhibits normal muscle tone. Coordination normal.   Skin: Skin is warm, dry, intact, not diaphoretic and not pale.   Psychiatric: Her speech is normal and behavior is normal. Mood, judgment and thought content normal.   Nursing note and vitals reviewed.         IMAGING-  XR CHEST PA AND LATERAL    Result Date: 4/2/2024  EXAMINATION: XR CHEST PA AND LATERAL CLINICAL HISTORY: Shortness of breath TECHNIQUE: PA and lateral views of the chest were performed. COMPARISON: 01/24/2024 FINDINGS: The cardiomediastinal silhouette is within normal limits.  The lungs are well expanded without consolidation or pleural effusion.     Negative chest.  No significant change. Electronically signed by: Rajeev Najera MD Date:    04/02/2024 Time:    14:29      Assessment:     1. Non-recurrent acute suppurative otitis media of left ear without spontaneous rupture of tympanic membrane    2. SOB (shortness of breath)    3. Wheezing    4. Acute cough        Plan:   FOLLOWUP  Follow up if symptoms worsen or fail to improve, for PLEASE CONTACT PCP OR CONTACT THE EMERGENCY ROOM..     PATIENT INSTRUCTIONS  Patient Instructions   INSTRUCTIONS:  - Rest.  - Drink plenty of fluids.  - Take Tylenol and/or Ibuprofen as directed as needed for fever/pain.  Do not take more than the recommended dose.  - follow up with your PCP within the next 1-2 weeks as needed.  - You must understand that you have received an Urgent Care treatment only and that you may be released before all of your medical problems are known or treated.   - You, the patient, will arrange for follow up care as instructed.   - If your condition worsens or fails to improve we recommend  that you receive another evaluation at the ER immediately or contact your PCP to discuss your concerns.   - You can call [phone] or [phone] to help schedule an appointment with the appropriate provider.     -If you smoke cigarettes, it would be beneficial for you to stop.         THANK YOU FOR ALLOWING ME TO PARTICIPATE IN YOUR HEALTHCARE,     Gómez D Charles, NP     Non-recurrent acute suppurative otitis media of left ear without spontaneous rupture of tympanic membrane  -     amoxicillin (AMOXIL) 875 MG tablet; Take 1 tablet (875 mg total) by mouth every 12 (twelve) hours. for 7 days  Dispense: 14 tablet; Refill: 0    SOB (shortness of breath)  -     XR CHEST PA AND LATERAL; Future; Expected date: 04/02/2024    Wheezing  -     XR CHEST PA AND LATERAL; Future; Expected date: 04/02/2024    Acute cough  -     promethazine-dextromethorphan (PROMETHAZINE-DM) 6.25-15 mg/5 mL Syrp; Take 5 mLs by mouth nightly as needed (cough).  Dispense: 118 mL; Refill: 0

## 2024-04-02 NOTE — PATIENT INSTRUCTIONS

## 2024-04-22 RX ORDER — LISDEXAMFETAMINE DIMESYLATE 30 MG/1
30 CAPSULE ORAL EVERY MORNING
Qty: 30 CAPSULE | Refills: 0 | Status: SHIPPED | OUTPATIENT
Start: 2024-04-22 | End: 2024-06-04 | Stop reason: SDUPTHER

## 2024-04-22 NOTE — TELEPHONE ENCOUNTER
Last ordered: 1 month ago (3/11/2024) by Jaz Koch NP     Patient comment: Please refill at [facility] on [id number]. Thank you!       Nov none   Lov 2/9/24

## 2024-05-06 ENCOUNTER — HOSPITAL ENCOUNTER (OUTPATIENT)
Dept: RADIOLOGY | Facility: HOSPITAL | Age: 39
Discharge: HOME | End: 2024-05-06
Attending: INTERNAL MEDICINE
Payer: COMMERCIAL

## 2024-05-06 ENCOUNTER — TELEPHONE (OUTPATIENT)
Dept: FAMILY MEDICINE | Facility: CLINIC | Age: 39
End: 2024-05-06
Payer: COMMERCIAL

## 2024-05-06 DIAGNOSIS — R92.8 ABNORMAL MAMMOGRAM: ICD-10-CM

## 2024-05-06 DIAGNOSIS — R92.8 ABNORMAL MAMMOGRAM: Primary | ICD-10-CM

## 2024-05-06 PROCEDURE — 77065 DX MAMMO INCL CAD UNI: CPT | Mod: TC,PO,LT

## 2024-05-06 PROCEDURE — 76642 ULTRASOUND BREAST LIMITED: CPT | Mod: TC,PO,LT

## 2024-05-06 PROCEDURE — 76642 ULTRASOUND BREAST LIMITED: CPT | Mod: 26,LT,, | Performed by: RADIOLOGY

## 2024-05-06 PROCEDURE — 77061 BREAST TOMOSYNTHESIS UNI: CPT | Mod: 26,LT,, | Performed by: RADIOLOGY

## 2024-05-06 PROCEDURE — 77061 BREAST TOMOSYNTHESIS UNI: CPT | Mod: TC,PO,LT

## 2024-05-06 PROCEDURE — 77065 DX MAMMO INCL CAD UNI: CPT | Mod: 26,LT,, | Performed by: RADIOLOGY

## 2024-05-06 NOTE — TELEPHONE ENCOUNTER
Please call the patient regarding her abnormal result. Her cologuard was positive and she will be  referred to GI for diagnostic colonscopy. Does she prefer to be seen by GI in [place] Please schedule bilateral diagnostic mammogram with left breast u/s in mid October    Thanks

## 2024-06-03 RX ORDER — VENLAFAXINE HYDROCHLORIDE 150 MG/1
150 CAPSULE, EXTENDED RELEASE ORAL
Qty: 90 CAPSULE | Refills: 0 | Status: SHIPPED | OUTPATIENT
Start: 2024-06-03

## 2024-06-04 RX ORDER — LISDEXAMFETAMINE DIMESYLATE 30 MG/1
30 CAPSULE ORAL EVERY MORNING
Qty: 30 CAPSULE | Refills: 0 | Status: SHIPPED | OUTPATIENT
Start: 2024-06-04 | End: 2024-07-04

## 2024-07-02 ENCOUNTER — OFFICE VISIT (OUTPATIENT)
Dept: PSYCHIATRY | Facility: CLINIC | Age: 39
End: 2024-07-02
Payer: COMMERCIAL

## 2024-07-02 DIAGNOSIS — F40.10 SOCIAL ANXIETY DISORDER: ICD-10-CM

## 2024-07-02 DIAGNOSIS — F33.41 RECURRENT MAJOR DEPRESSIVE DISORDER, IN PARTIAL REMISSION: Primary | ICD-10-CM

## 2024-07-02 DIAGNOSIS — F41.1 GAD (GENERALIZED ANXIETY DISORDER): ICD-10-CM

## 2024-07-02 DIAGNOSIS — F51.01 PRIMARY INSOMNIA: ICD-10-CM

## 2024-07-02 DIAGNOSIS — F90.2 ATTENTION DEFICIT HYPERACTIVITY DISORDER (ADHD), COMBINED TYPE: ICD-10-CM

## 2024-07-02 PROCEDURE — 1160F RVW MEDS BY RX/DR IN RCRD: CPT | Mod: CPTII,95,,

## 2024-07-02 PROCEDURE — 99215 OFFICE O/P EST HI 40 MIN: CPT | Mod: 95,,,

## 2024-07-02 PROCEDURE — 1159F MED LIST DOCD IN RCRD: CPT | Mod: CPTII,95,,

## 2024-07-02 PROCEDURE — G2211 COMPLEX E/M VISIT ADD ON: HCPCS | Mod: 95,,,

## 2024-07-02 RX ORDER — BUPROPION HYDROCHLORIDE 150 MG/1
150 TABLET ORAL DAILY
Qty: 90 TABLET | Refills: 0 | Status: SHIPPED | OUTPATIENT
Start: 2024-07-02 | End: 2025-07-02

## 2024-07-02 NOTE — PROGRESS NOTES
"OUTPATIENT PSYCHIATRY FOLLOW UP VISIT    Encounter Date: 7/2/2024    Clinical Status of Patient:  Outpatient (Virtual)  The patient location is: [address] [place] [address]  The patient phone number is: [phone]   Visit type: Virtual visit with synchronous audio and video  Each patient to whom he or she provides medical services by telemedicine is:  (1) informed of the relationship between the practitioner and patient and the respective role of any other health care provider with respect to management of the patient; and (2) notified that he or she may decline to receive medical services by telemedicine and may withdraw from such care at any time.    Chief Complaint:  Christel Alejo is a 38 y.o. female who presents today for follow-up.  Met with patient.      HISTORY OF PRESENTING ILLNESS:  Christel Alejo is a 38 y.o. female with history of MDD, KAYODE, SAD, ADHD, insomnia who presents for follow up appointment.      INITIAL HPI:  Patient reports a long history of anxiety and states I have always had anxiety." She reports anxiety worsened after she had children.  She was hospitalized with COVID 2 years ago and states she "hasn't recovered in terms of work, cognitive ability. I can't focus. I'm losing time management ability. I have brain fog. It's affecting my career." Patient was most recently on Viibryd which stopped working several months ago at which time she began struggling with anxiety and depression.  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  She also reports becoming flushed and anxious when interacting with people. She reports insomnia despite taking Vistaril 50 mg q.h.s..     She was diagnosed with ADHD by Dr Bhagat approximately 1 year ago, and notes her children have ADHD as well. Was taking Vyvanse 50 mg, which improved focus and concentration, but increased anxiety.  She did try taking Vyvanse 30 which was effective and caused " "less anxiety     11/29/2023: Venlafaxine ER 75 mg daily was started 1 month ago and increased to 150 mg daily 2 weeks ago.  Patient reports improvement in mood as well as generalized anxiety.  She does report worsening fatigue but has been ill with flu-like symptoms for the last several days.  She reports continued insomnia noting trazodone has not improved sleep.  She repeatedly wakes up around 3:00 a.m. and experiences difficulty returning to sleep.   She reports her main concern this time is her ability to focus and concentrate and states she is "struggling trying to keep up with work."    2/9/2024: Mood has been stable and anxiety is well controlled.  Patient reports she forgot to bring venlafaxine on a 4 day work trip and experienced fatigue and irritability which resolved once she restarted venlafaxine.    Pt reports significant improvement in symptoms of ADHD-IT after starting lisdexamfetamine. Has been able to concentrate and focus while at work and is more productive.  She denies any increase in anxiety with starting lisdexamfetamine.  She did take lisdexamfetamine 50 mg daily at 1 point in the past but found this to be over stimulating causing increase in anxiety. Pt denies cardiovascular events including increased heart rate or increased blood pressure, palpitations, chest pain, dizziness, lightheadedness, or syncopal episodes. Pt denies A/V hallucinations or behavioral effects. Pt denies anorexia, decreased appetite, xerostomia, headache, insomnia, or digital changes.    Patient reports she forgot to  doxepin from the pharmacy after our last visit and has continued to take trazodone for insomnia.  She reports she continues to experience insomnia.  She is unable to take a whole trazodone 50 mg tablet has this causes daytime sedation.  When she takes less than 1 tablet she experiences awakening during the night.      Plan at last appointment:  Continue venlafaxine  mg daily for mood and " "anxiety  Continue lisdexamfetamine 30 mg q.a.m. for ADHD  Discontinue trazodone due to lack of effect   Start doxepin 10 mg q.h.s. p.r.n. insomnia (patient forgot to  from the pharmacy after our last visit)  Offered referral for individual psychotherapy.    Psychotropic medication history:   Zoloft 50 mg daily, Paxil (nausea), viibryd 40 mg daily  xanax 0.25 mg p.r.n. anxiety (effective, hasn't needed)  Trazodone (effective)  Not escitalopram, prozac, effexor, cymbalta, fluoxetine, bupropion       INTERVAL HISTORY:    Ms. Alejo reports she quit her job last week on 6/24 d/t ongoing conflict in the workplace.     She reports feeling tense and frustrated recently and notes things are setting her off more quickly than usual.   She notes she continues to feel some anxiety and angst, despite quitting over 1 week ago.  She has had difficulty motivating herself. "It was difficult to be "on" all the time while at work."  She notes significant fatigue that is affecting her functioning.   Doxepin was started at last visit for insomnia, however this did not provide relief. Has switched back to trazodone; this previously caused AM drowsiness. Is taking a quarter of a tablet of a 50 mg trazodone at night without drowsiness in the morning.   Symptoms of ADHD continue to be well controlled with lisdexamfetamine 30 mg q.a.m..    No interval episodes with symptoms consistent with eric or hypomania.  Denied interval or current suicidal/homicidal thoughts, intent, or plan or NSSI.  Denied other questions and concerns.  Patient reports feeling stable and wishing to continue current management unchanged.    Medication side effects: None Pt denies cardiovascular events including increased heart rate or increased blood pressure, palpitations, chest pain, dizziness, lightheadedness, or syncopal episodes. Pt denies A/V hallucinations or behavioral effects. Pt denies anorexia, decreased appetite, xerostomia, headache, insomnia, or " digital changes.  Medication adherence: yes    PSYCHIATRIC REVIEW OF SYSTEMS:  Is patient experiencing or having changes in:  Trouble with sleep:  insomnia, can fall asleep, continues to wake up at 3 am, no improvement with trazodone, difficulty returning to sleep, racing thoughts  Appetite changes:  no  Weight changes:  no  Lack of energy: improving  Anhedonia: difficult to motivate herself  Somatic symptoms:  no  Anxiety/panic:  continues to improve  Irritability: no  Guilty/hopeless:  no  Concentration: no  Racing thoughts: no  Impulsive behaviors: no  Paranoia/AVH: no  Self-injurious behavior/risky behavior:  no  Any drugs:  no  Alcohol:  no      MEDICAL REVIEW OF SYSTEMS:   Pain: Denies any significant chronic or acute pain.  Constitutional: Denies fever or change in appetite.  Cardiovascular: Denies chest pain or exertional dyspnea.  Respiratory: Denies cough or orthopnea.   GI: Denies abdominal pain, N/V  Neurological: Denies tremor, seizure, or focal weakness.  Psychiatric: See HPI above.    PAST PSYCHIATRIC, MEDICAL, AND SOCIAL HISTORY REVIEWED  The patient's past medical, family and social history have been reviewed and updated as appropriate within the electronic medical record - see encounter notes.    PAST MEDICAL HISTORY:   Past Medical History:   Diagnosis Date    Allergy     Anxiety     Depression     History of cervical dysplasia     s/p conization followers with Dr Vargas    S/P LASIK surgery of both eyes     Head trauma/Loss of consciousness: denies  Seizures: denies     PAST PSYCHIATRIC HISTORY:  Psychiatric Care (current & past):  Dr Bhagat for treatment of ADHD, patient's OBGYN prescribed antidepressants in the past  Previous Psychiatric Diagnoses:  ADHD, KAYODE, MDD  Previous Psychiatric Hospitalizations: denies  Previous SI/HI:  denies  Previous Suicide Attempts or NSSI: denies  History of Psychotherapy: denies  History of Violence: denies    FAMILY HISTORY:   Paternal: abusive alcoholic  father  Maternal: no psychiatric history or history of substance abuse or suicide  Children:  ADHD     SOCIAL HISTORY:   Marital Status/Relationship Status:  x 19 years  Children: 2 children, boys, 14 and 13   Resides/Housing Status: [place]   Occupation/Employment:   Hobbies/Recreational Activities: crafting, shopping    Spirituality/Hoahaoism: raised Yarsanism, not practicing  Education level: HS grad   History: denies  Legal History: denies  Access to firearms: yes, locked in a safe,  is a gregory     SUBSTANCE USE HISTORY:  Caffeine: coffee 1 large cup in AM  Tobacco: denies  Alcohol: occasionally  Other Substances: denies  Rehab: denies  Detoxes:  denies      MEDICATIONS:    Current Outpatient Medications:     atorvastatin (LIPITOR) 10 MG tablet, Take 1 tablet (10 mg total) by mouth once daily., Disp: 90 tablet, Rfl: 3    buPROPion (WELLBUTRIN XL) 150 MG TB24 tablet, Take 1 tablet (150 mg total) by mouth once daily., Disp: 90 tablet, Rfl: 0    cetirizine (ZYRTEC) 10 MG tablet, Take 10 mg by mouth once daily., Disp: , Rfl:     EPINEPHrine (EPIPEN) 0.3 mg/0.3 mL AtIn, Inject 0.3 mLs (0.3 mg total) into the muscle once. for 1 dose, Disp: 0.3 mL, Rfl: 0    INTRAUTERINE DEVICE, IUD, IU, by Intrauterine route., Disp: , Rfl:     levonorgestreL (MIRENA) 20 mcg/24 hours (7 yrs) 52 mg IUD, Mirena 20 mcg/24 hours (7 yrs) 52 mg intrauterine device  Take by intrauterine route., Disp: , Rfl:     lisdexamfetamine (VYVANSE) 30 MG capsule, Take 1 capsule (30 mg total) by mouth every morning., Disp: 30 capsule, Rfl: 0    venlafaxine (EFFEXOR-XR) 150 MG Cp24, Take 1 capsule by mouth once daily, Disp: 90 capsule, Rfl: 0    ALLERGIES:  Review of patient's allergies indicates:   Allergen Reactions    Paxil [paroxetine hcl] Nausea Only       EXAM:  Constitutional  Vitals:  Most recent vital signs were reviewed.   Last 3 sets of VS:      11/29/2023     5:03 PM 1/24/2024     6:25 PM 4/2/2024      "1:28 PM   Vitals - 1 value per visit   SYSTOLIC 124 138 131   DIASTOLIC 87 85 92   Pulse 100 96 80   Temp 98.8 °F (37.1 °C) 98.7 °F (37.1 °C) 98.6 °F (37 °C)   Resp 18 18 20   SPO2 98 % 97 % 98 %   Weight (lb) 191 191 191   Weight (kg) 86.637 86.637 86.637   Height 5' 5" (1.651 m) 5' 5" (1.651 m) 5' 5" (1.651 m)   BMI (Calculated) 31.8 31.8 31.8   Pain Score  Zero Zero      General:  unremarkable, age appropriate     Musculoskeletal  Muscle Strength/Tone:  no tremor or abnormal movements   Gait & Station:  Steady, non-ataxic     Psychiatric  Speech:  no latency; no press   Mood & Affect:  euthymic  congruent and appropriate   Thought Process:  normal and logical   Associations:  intact   Thought Content:  normal, no suicidality, no homicidality, delusions, or paranoia   Insight:  intact   Judgement: behavior is adequate to circumstances   Orientation:  grossly intact   Memory: intact for content of interview   Language: grossly intact   Attention Span & Concentration:  able to focus   Fund of Knowledge:  intact and appropriate to age and level of education     SUICIDE RISK ASSESSMENT:  Protective factors: age, gender, no prior attempts, no prior hospitalizations, no family h/o attempts, no ongoing substance abuse, no psychosis, , has children, denies SI/intent/plan, seeking treatment, access to treatment, future oriented, good primary support  Risks:  Ongoing depression anxiety, history of ADHD, access to firearms  Patient is a low immediate and long-term risk considering risk factors.      RELEVANT LABS/STUDIES:    Lab Results   Component Value Date    WBC 6.29 10/06/2023    HGB 13.9 10/06/2023    HCT 42.1 10/06/2023    MCV 89 10/06/2023     10/06/2023     BMP  Lab Results   Component Value Date     10/06/2023    K 4.6 10/06/2023     10/06/2023    CO2 24 10/06/2023    BUN 10 10/06/2023    CREATININE 0.7 10/06/2023    CALCIUM 9.4 10/06/2023    ANIONGAP 11 10/06/2023    ESTGFRAFRICA >60.0 " 03/05/2021    EGFRNONAA >60.0 03/05/2021     Lab Results   Component Value Date    ALT 32 10/06/2023    AST 22 10/06/2023    ALKPHOS 62 10/06/2023    BILITOT 0.7 10/06/2023     Lab Results   Component Value Date    TSH 1.507 10/06/2023     Lab Results   Component Value Date    HGBA1C 5.2 10/06/2023     Lab Results   Component Value Date    CHOL 277 (H) 10/06/2023    TRIG 193 (H) 10/06/2023    HDL 60 10/06/2023    LDLCALC 178.4 (H) 10/06/2023    CHOLHDL 21.7 10/06/2023    TOTALCHOLEST 4.6 10/06/2023       IMPRESSION:    Christel Alejo is a 38 y.o. female with history of MDD, KAYODE, SAD, ADHD, insomnia, who presents for follow up appointment.    Status/Progress: Based on the examination today, the patient's problem(s) is/are adequately but not ideally controlled.  New problems have not been presented today.   Co-morbidities are complicating management of the primary condition.  There are no active rule-out diagnoses for this patient at this time.     Risk Parameters:  Patient reports no suicidal ideation  Patient reports no homicidal ideation  Patient reports no self-injurious behavior  Patient reports no violent behavior    DIAGNOSES:    ICD-10-CM ICD-9-CM   1. Recurrent major depressive disorder, in partial remission  F33.41 296.35   2. KAYODE (generalized anxiety disorder)  F41.1 300.02   3. Attention deficit hyperactivity disorder (ADHD), combined type  F90.2 314.01   4. Social anxiety disorder  F40.10 300.23   5. Primary insomnia  F51.01 307.42       PLAN:  Continue venlafaxine  mg daily for mood and anxiety - patient will switch administration to q.h.s. for 1 week as a test to see if this will improve daytime fatigue  If daytime fatigue is not improved after 1 week patient will start bupropion  mg q.a.m. for residual symptoms of MDD including fatigue  Continue lisdexamfetamine 30 mg q.a.m. for ADHD  Continue trazodone 12.5 mg q.h.s. p.r.n. insomnia (patient is breaking a 50 mg tablet into 4 pieces.   25 mg causes a.m. drowsiness)   Offered referral for individual psychotherapy.    RETURN TO CLINIC:   1 month      KARL Bergman, PMHNP-BC    53 minutes of total time spent on the encounter, which includes face to face time and non-face to face time preparing to see the patient (eg. review of tests), obtaining and/or reviewing separately obtained history, documenting clinical information in the electronic health record, independently interpreting results (not separately reported), and communicating results to the patient/family/caregiver, or care coordination (not separately reported).     Visit today included managing the longitudinal care of the patient due to the serious and/or complex managed problem(s) MDD, KAYODE, ADHD, social anxiety disorder, insomnia.    At this time there are no indications the patient represents an imminent danger to either themselves or others; will continue to manage treatment in the outpatient setting.    I discussed the patient's care with the patient including benefits, alternatives, possible adverse effects of the treatment plan; including the potential for metabolic complications, major organ dysfunction, black box warnings, and contraindications. The opportunity was given for questions/clarification, and after this discussion the above treatment plan was devised through shared decision making. The patient voiced their understanding of the diagnoses and treatments listed above and agreed to the treatment plan. Follow up plan was reviewed with the patient. The patient was advised to call to report any worsening of symptoms or problems with medication.    Supportive therapy and psychoeducation provided. I discussed the importance of regular exercise, maintenance of a healthy weight, balanced diet rich in fruits/vegetables and lean protein, and avoidance of unhealthy habits like smoking and excessive alcohol intake.     Patient has been given crisis information including Suicide  "and Crisis Lifeline (call or text: 396). Patient also given instructions to go to the nearest ER or call 911 if unable to remain safe or if the Pt develops thoughts of harming self or others.    [facility]: Reviewed today to detect potential controlled substance misuse, diversion, excessive prescribing, or multiple providers prescribing controlled substances. The patients report was deemed appropriate without new medications of concern prescribed by other providers.    Documentation entered by me for this encounter may have been done in part using [employer] voice recognition transcription software. Garbled syntax, mangled pronouns, and other bizarre constructions may be attributed to that software system. Although I have made an effort to ensure accuracy, "sound like" errors may exist and should be interpreted in context.  "

## 2024-07-16 RX ORDER — LISDEXAMFETAMINE DIMESYLATE 30 MG/1
30 CAPSULE ORAL EVERY MORNING
Qty: 30 CAPSULE | Refills: 0 | Status: SHIPPED | OUTPATIENT
Start: 2024-07-16 | End: 2024-08-15

## 2024-08-26 RX ORDER — LISDEXAMFETAMINE DIMESYLATE 30 MG/1
30 CAPSULE ORAL EVERY MORNING
Qty: 30 CAPSULE | Refills: 0 | Status: SHIPPED | OUTPATIENT
Start: 2024-08-26 | End: 2024-08-28 | Stop reason: SDUPTHER

## 2024-08-26 RX ORDER — BUPROPION HYDROCHLORIDE 150 MG/1
150 TABLET ORAL DAILY
Qty: 90 TABLET | Refills: 0 | Status: SHIPPED | OUTPATIENT
Start: 2024-08-26 | End: 2025-08-26

## 2024-08-26 NOTE — TELEPHONE ENCOUNTER
LOV: 07/02/2024  NOV: RETURN TO CLINIC:   1 month   LDO: 07/02/2024(Wellbutrin XL) 07/16/2024(VYVANSE)

## 2024-08-28 ENCOUNTER — OFFICE VISIT (OUTPATIENT)
Dept: PSYCHIATRY | Facility: CLINIC | Age: 39
End: 2024-08-28
Payer: COMMERCIAL

## 2024-08-28 DIAGNOSIS — F41.1 GAD (GENERALIZED ANXIETY DISORDER): ICD-10-CM

## 2024-08-28 DIAGNOSIS — F33.41 RECURRENT MAJOR DEPRESSIVE DISORDER, IN PARTIAL REMISSION: Primary | ICD-10-CM

## 2024-08-28 DIAGNOSIS — F40.10 SOCIAL ANXIETY DISORDER: ICD-10-CM

## 2024-08-28 DIAGNOSIS — F51.01 PRIMARY INSOMNIA: ICD-10-CM

## 2024-08-28 DIAGNOSIS — F90.2 ATTENTION DEFICIT HYPERACTIVITY DISORDER (ADHD), COMBINED TYPE: ICD-10-CM

## 2024-08-28 PROCEDURE — G2211 COMPLEX E/M VISIT ADD ON: HCPCS | Mod: 95,,,

## 2024-08-28 PROCEDURE — 99214 OFFICE O/P EST MOD 30 MIN: CPT | Mod: 95,,,

## 2024-08-28 RX ORDER — VENLAFAXINE HYDROCHLORIDE 150 MG/1
150 CAPSULE, EXTENDED RELEASE ORAL DAILY
Qty: 90 CAPSULE | Refills: 1 | Status: SHIPPED | OUTPATIENT
Start: 2024-08-28

## 2024-08-28 RX ORDER — LISDEXAMFETAMINE DIMESYLATE 30 MG/1
30 CAPSULE ORAL EVERY MORNING
Qty: 30 CAPSULE | Refills: 0 | Status: SHIPPED | OUTPATIENT
Start: 2024-10-27 | End: 2024-11-26

## 2024-08-28 RX ORDER — LISDEXAMFETAMINE DIMESYLATE 30 MG/1
30 CAPSULE ORAL EVERY MORNING
Qty: 30 CAPSULE | Refills: 0 | Status: SHIPPED | OUTPATIENT
Start: 2024-08-28 | End: 2024-09-27

## 2024-08-28 RX ORDER — LISDEXAMFETAMINE DIMESYLATE 30 MG/1
30 CAPSULE ORAL EVERY MORNING
Qty: 30 CAPSULE | Refills: 0 | Status: SHIPPED | OUTPATIENT
Start: 2024-09-27 | End: 2024-10-27

## 2024-08-28 RX ORDER — TRAZODONE HYDROCHLORIDE 50 MG/1
50 TABLET ORAL NIGHTLY
Qty: 90 TABLET | Refills: 0 | Status: SHIPPED | OUTPATIENT
Start: 2024-08-28 | End: 2025-08-28

## 2024-08-28 NOTE — PROGRESS NOTES
"OUTPATIENT PSYCHIATRY FOLLOW UP VISIT    Encounter Date: 8/28/2024    Clinical Status of Patient:  Outpatient (Virtual)  The patient location is: [address] [place] [address]  The patient phone number is: [phone]   Visit type: Virtual visit with synchronous audio and video  Each patient to whom he or she provides medical services by telemedicine is:  (1) informed of the relationship between the practitioner and patient and the respective role of any other health care provider with respect to management of the patient; and (2) notified that he or she may decline to receive medical services by telemedicine and may withdraw from such care at any time.    Chief Complaint:  Christel Alejo is a 38 y.o. female who presents today for follow-up.  Met with patient.      HISTORY OF PRESENTING ILLNESS:  Christel Alejo is a 38 y.o. female with history of MDD, KAYODE, SAD, ADHD, insomnia who presents for follow up appointment.      INITIAL HPI:  Patient reports a long history of anxiety and states I have always had anxiety." She reports anxiety worsened after she had children.  She was hospitalized with COVID 2 years ago and states she "hasn't recovered in terms of work, cognitive ability. I can't focus. I'm losing time management ability. I have brain fog. It's affecting my career." Patient was most recently on Viibryd which stopped working several months ago at which time she began struggling with anxiety and depression.  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  She also reports becoming flushed and anxious when interacting with people. She reports insomnia despite taking Vistaril 50 mg q.h.s..     She was diagnosed with ADHD by Dr Bhagat approximately 1 year ago, and notes her children have ADHD as well. Was taking Vyvanse 50 mg, which improved focus and concentration, but increased anxiety.  She did try taking Vyvanse 30 which was effective and caused " "less anxiety     11/29/2023: Venlafaxine ER 75 mg daily was started 1 month ago and increased to 150 mg daily 2 weeks ago.  Patient reports improvement in mood as well as generalized anxiety.  She does report worsening fatigue but has been ill with flu-like symptoms for the last several days.  She reports continued insomnia noting trazodone has not improved sleep.  She repeatedly wakes up around 3:00 a.m. and experiences difficulty returning to sleep.   She reports her main concern this time is her ability to focus and concentrate and states she is "struggling trying to keep up with work."    2/9/2024: Mood has been stable and anxiety is well controlled.  Patient reports she forgot to bring venlafaxine on a 4 day work trip and experienced fatigue and irritability which resolved once she restarted venlafaxine.    Pt reports significant improvement in symptoms of ADHD-IT after starting lisdexamfetamine. Has been able to concentrate and focus while at work and is more productive.  She denies any increase in anxiety with starting lisdexamfetamine.  She did take lisdexamfetamine 50 mg daily at 1 point in the past but found this to be over stimulating causing increase in anxiety. Pt denies cardiovascular events including increased heart rate or increased blood pressure, palpitations, chest pain, dizziness, lightheadedness, or syncopal episodes. Pt denies A/V hallucinations or behavioral effects. Pt denies anorexia, decreased appetite, xerostomia, headache, insomnia, or digital changes.    Patient reports she forgot to  doxepin from the pharmacy after our last visit and has continued to take trazodone for insomnia.  She reports she continues to experience insomnia.  She is unable to take a whole trazodone 50 mg tablet has this causes daytime sedation.  When she takes less than 1 tablet she experiences awakening during the night.      7/2/2024: Ms. Alejo reports she quit her job last week on 6/24 d/t ongoing " "conflict in the workplace.     She reports feeling tense and frustrated recently and notes things are setting her off more quickly than usual.   She notes she continues to feel some anxiety and angst, despite quitting over 1 week ago.  She has had difficulty motivating herself. "It was difficult to be "on" all the time while at work."  She notes significant fatigue that is affecting her functioning.   Doxepin was started at last visit for insomnia, however this did not provide relief. Has switched back to trazodone; this previously caused AM drowsiness. Is taking a quarter of a tablet of a 50 mg trazodone at night without drowsiness in the morning.   Symptoms of ADHD continue to be well controlled with lisdexamfetamine 30 mg q.a.m..    Plan at last appointment:  Continue venlafaxine  mg daily for mood and anxiety - patient will switch administration to q.h.s. for 1 week as a test to see if this will improve daytime fatigue  If daytime fatigue is not improved after 1 week patient will start bupropion  mg q.a.m. for residual symptoms of MDD including fatigue  Continue lisdexamfetamine 30 mg q.a.m. for ADHD  Continue trazodone 12.5 mg q.h.s. p.r.n. insomnia (patient is breaking a 50 mg tablet into 4 pieces.  25 mg causes a.m. drowsiness)   Offered referral for individual psychotherapy.    Psychotropic medication history:   Zoloft 50 mg daily, Paxil (nausea), viibryd 40 mg daily  xanax 0.25 mg p.r.n. anxiety (effective, hasn't needed)  Trazodone (effective)  Not escitalopram, prozac, effexor, cymbalta, fluoxetine, bupropion       INTERVAL HISTORY:    Ms. Alejo reports she quit her job last week on 6/24 d/t ongoing conflict in the workplace.     She reports feeling tense and frustrated recently and notes things are setting her off more quickly than usual.   She notes she continues to feel some anxiety and angst, despite quitting over 1 week ago.  She has had difficulty motivating herself. "It was difficult " "to be "on" all the time while at work."  She notes significant fatigue that is affecting her functioning.   Doxepin was started at last visit for insomnia, however this did not provide relief. Has switched back to trazodone; this previously caused AM drowsiness. Is taking a quarter of a tablet of a 50 mg trazodone at night without drowsiness in the morning.   Symptoms of ADHD continue to be well controlled with lisdexamfetamine 30 mg q.a.m..    Is patient experiencing or having changes in:  Trouble with sleep:  insomnia, can fall asleep, continues to wake up at 3 am, no improvement with trazodone, difficulty returning to sleep, racing thoughts  Appetite changes:  no  Weight changes:  no  Lack of energy: improving  Anhedonia: difficult to motivate herself  Somatic symptoms:  no  Anxiety/panic:  continues to improve  Irritability: no  Guilty/hopeless:  no  Concentration: no  Racing thoughts: no  Impulsive behaviors: no  Paranoia/AVH: no  Self-injurious behavior/risky behavior:  no  Any drugs:  no  Alcohol:  no    No interval episodes with symptoms consistent with eric or hypomania.  Denied interval or current suicidal/homicidal thoughts, intent, or plan or NSSI.  Denied other questions and concerns.  Patient reports feeling stable and wishing to continue current management unchanged.    Medication side effects: None Pt denies cardiovascular events including increased heart rate or increased blood pressure, palpitations, chest pain, dizziness, lightheadedness, or syncopal episodes. Pt denies A/V hallucinations or behavioral effects. Pt denies anorexia, decreased appetite, xerostomia, headache, insomnia, or digital changes.  Medication adherence: yes    MEDICAL REVIEW OF SYSTEMS:   Pain: Denies any significant chronic or acute pain.  Constitutional: Denies fever or change in appetite.  Cardiovascular: Denies chest pain or exertional dyspnea.  Respiratory: Denies cough or orthopnea.   GI: Denies abdominal pain, " N/V  Neurological: Denies tremor, seizure, or focal weakness.  Psychiatric: See HPI above.    PAST PSYCHIATRIC, MEDICAL, AND SOCIAL HISTORY REVIEWED  The patient's past medical, family and social history have been reviewed and updated as appropriate within the electronic medical record - see encounter notes.    PAST MEDICAL HISTORY:   Past Medical History:   Diagnosis Date    Allergy     Anxiety     Depression     History of cervical dysplasia     s/p conization followers with Dr Vargas    S/P LASIK surgery of both eyes     Head trauma/Loss of consciousness: denies  Seizures: denies     PAST PSYCHIATRIC HISTORY:  Psychiatric Care (current & past):  Dr Bhagat for treatment of ADHD, patient's OBGYN prescribed antidepressants in the past  Previous Psychiatric Diagnoses:  ADHD, KAYODE, MDD  Previous Psychiatric Hospitalizations: denies  Previous SI/HI:  denies  Previous Suicide Attempts or NSSI: denies  History of Psychotherapy: denies  History of Violence: denies    FAMILY HISTORY:   Paternal: abusive alcoholic father  Maternal: no psychiatric history or history of substance abuse or suicide  Children:  ADHD     SOCIAL HISTORY:   Marital Status/Relationship Status:  x 19 years  Children: 2 children, boys, 14 and 13   Resides/Housing Status: [place]   Occupation/Employment: [employer]  Hobbies/Recreational Activities: crafting, shopping    Spirituality/Advent: raised Taoism, not practicing  Education level: HS grad   History: denies  Legal History: denies  Access to firearms: yes, locked in a safe,  is a gregory     SUBSTANCE USE HISTORY:  Caffeine: coffee 1 large cup in AM  Tobacco: denies  Alcohol: occasionally  Other Substances: denies  Rehab: denies  Detoxes:  denies      MEDICATIONS:    Current Outpatient Medications:     atorvastatin (LIPITOR) 10 MG tablet, Take 1 tablet (10 mg total) by mouth once daily., Disp: 90 tablet, Rfl: 3    buPROPion (WELLBUTRIN XL) 150 MG TB24 tablet, Take  "1 tablet (150 mg total) by mouth once daily., Disp: 90 tablet, Rfl: 0    cetirizine (ZYRTEC) 10 MG tablet, Take 10 mg by mouth once daily., Disp: , Rfl:     EPINEPHrine (EPIPEN) 0.3 mg/0.3 mL AtIn, Inject 0.3 mLs (0.3 mg total) into the muscle once. for 1 dose, Disp: 0.3 mL, Rfl: 0    INTRAUTERINE DEVICE, IUD, IU, by Intrauterine route., Disp: , Rfl:     levonorgestreL (MIRENA) 20 mcg/24 hours (7 yrs) 52 mg IUD, Mirena 20 mcg/24 hours (7 yrs) 52 mg intrauterine device  Take by intrauterine route., Disp: , Rfl:     lisdexamfetamine (VYVANSE) 30 MG capsule, Take 1 capsule (30 mg total) by mouth every morning., Disp: 30 capsule, Rfl: 0    venlafaxine (EFFEXOR-XR) 150 MG Cp24, Take 1 capsule by mouth once daily, Disp: 90 capsule, Rfl: 0    ALLERGIES:  Review of patient's allergies indicates:   Allergen Reactions    Paxil [paroxetine hcl] Nausea Only       EXAM:  Constitutional  Vitals:  Most recent vital signs were reviewed.   Last 3 sets of VS:      11/29/2023     5:03 PM 1/24/2024     6:25 PM 4/2/2024     1:28 PM   Vitals - 1 value per visit   SYSTOLIC 124 138 131   DIASTOLIC 87 85 92   Pulse 100 96 80   Temp 98.8 °F (37.1 °C) 98.7 °F (37.1 °C) 98.6 °F (37 °C)   Resp 18 18 20   SPO2 98 % 97 % 98 %   Weight (lb) 191 191 191   Weight (kg) 86.637 86.637 86.637   Height 5' 5" (1.651 m) 5' 5" (1.651 m) 5' 5" (1.651 m)   BMI (Calculated) 31.8 31.8 31.8   Pain Score  Zero Zero      General:  unremarkable, age appropriate     Musculoskeletal  Muscle Strength/Tone:  no tremor or abnormal movements   Gait & Station:  Steady, non-ataxic     Psychiatric  Speech:  no latency; no press   Mood & Affect:  euthymic  congruent and appropriate   Thought Process:  normal and logical   Associations:  intact   Thought Content:  normal, no suicidality, no homicidality, delusions, or paranoia   Insight:  intact   Judgement: behavior is adequate to circumstances   Orientation:  grossly intact   Memory: intact for content of interview "   Language: grossly intact   Attention Span & Concentration:  able to focus   Fund of Knowledge:  intact and appropriate to age and level of education     SUICIDE RISK ASSESSMENT:  Protective factors: age, gender, no prior attempts, no prior hospitalizations, no family h/o attempts, no ongoing substance abuse, no psychosis, , has children, denies SI/intent/plan, seeking treatment, access to treatment, future oriented, good primary support  Risks:  Ongoing depression anxiety, history of ADHD, access to firearms  Patient is a low immediate and long-term risk considering risk factors.      RELEVANT LABS/STUDIES:    Lab Results   Component Value Date    WBC 6.29 10/06/2023    HGB 13.9 10/06/2023    HCT 42.1 10/06/2023    MCV 89 10/06/2023     10/06/2023     BMP  Lab Results   Component Value Date     10/06/2023    K 4.6 10/06/2023     10/06/2023    CO2 24 10/06/2023    BUN 10 10/06/2023    CREATININE 0.7 10/06/2023    CALCIUM 9.4 10/06/2023    ANIONGAP 11 10/06/2023    ESTGFRAFRICA >60.0 03/05/2021    EGFRNONAA >60.0 03/05/2021     Lab Results   Component Value Date    ALT 32 10/06/2023    AST 22 10/06/2023    ALKPHOS 62 10/06/2023    BILITOT 0.7 10/06/2023     Lab Results   Component Value Date    TSH 1.507 10/06/2023     Lab Results   Component Value Date    HGBA1C 5.2 10/06/2023     Lab Results   Component Value Date    CHOL 277 (H) 10/06/2023    TRIG 193 (H) 10/06/2023    HDL 60 10/06/2023    LDLCALC 178.4 (H) 10/06/2023    CHOLHDL 21.7 10/06/2023    TOTALCHOLEST 4.6 10/06/2023       IMPRESSION:    Christel Alejo is a 38 y.o. female with history of MDD, KAYODE, SAD, ADHD, insomnia, who presents for follow up appointment.    Status/Progress: Based on the examination today, the patient's problem(s) is/are adequately but not ideally controlled.  New problems have not been presented today.   Co-morbidities are complicating management of the primary condition.  There are no active rule-out  diagnoses for this patient at this time.     Risk Parameters:  Patient reports no suicidal ideation  Patient reports no homicidal ideation  Patient reports no self-injurious behavior  Patient reports no violent behavior    DIAGNOSES:  No diagnosis found.      PLAN:  Continue venlafaxine  mg daily for mood and anxiety - patient will switch administration to q.h.s. for 1 week as a test to see if this will improve daytime fatigue  If daytime fatigue is not improved after 1 week patient will start bupropion  mg q.a.m. for residual symptoms of MDD including fatigue  Continue lisdexamfetamine 30 mg q.a.m. for ADHD  Continue trazodone 12.5 mg q.h.s. p.r.n. insomnia (patient is breaking a 50 mg tablet into 4 pieces.  25 mg causes a.m. drowsiness)   Offered referral for individual psychotherapy.    RETURN TO CLINIC:   1 month      KARL Bergman, PMHNP-BC    53 minutes of total time spent on the encounter, which includes face to face time and non-face to face time preparing to see the patient (eg. review of tests), obtaining and/or reviewing separately obtained history, documenting clinical information in the electronic health record, independently interpreting results (not separately reported), and communicating results to the patient/family/caregiver, or care coordination (not separately reported).     Visit today included managing the longitudinal care of the patient due to the serious and/or complex managed problem(s) MDD, KAYODE, ADHD, social anxiety disorder, insomnia.    At this time there are no indications the patient represents an imminent danger to either themselves or others; will continue to manage treatment in the outpatient setting.    I discussed the patient's care with the patient including benefits, alternatives, possible adverse effects of the treatment plan; including the potential for metabolic complications, major organ dysfunction, black box warnings, and contraindications. The  "opportunity was given for questions/clarification, and after this discussion the above treatment plan was devised through shared decision making. The patient voiced their understanding of the diagnoses and treatments listed above and agreed to the treatment plan. Follow up plan was reviewed with the patient. The patient was advised to call to report any worsening of symptoms or problems with medication.    Supportive therapy and psychoeducation provided. I discussed the importance of regular exercise, maintenance of a healthy weight, balanced diet rich in fruits/vegetables and lean protein, and avoidance of unhealthy habits like smoking and excessive alcohol intake.     Patient has been given crisis information including Suicide and Crisis Lifeline (call or text: 192). Patient also given instructions to go to the nearest ER or call 911 if unable to remain safe or if the Pt develops thoughts of harming self or others.    [facility]: Reviewed today to detect potential controlled substance misuse, diversion, excessive prescribing, or multiple providers prescribing controlled substances. The patients report was deemed appropriate without new medications of concern prescribed by other providers.    Documentation entered by me for this encounter may have been done in part using [employer] voice recognition transcription software. Garbled syntax, mangled pronouns, and other bizarre constructions may be attributed to that software system. Although I have made an effort to ensure accuracy, "sound like" errors may exist and should be interpreted in context.    "

## 2024-08-28 NOTE — PROGRESS NOTES
"OUTPATIENT PSYCHIATRY FOLLOW UP VISIT    Encounter Date: 8/28/2024    Clinical Status of Patient:  Outpatient (Virtual)  The patient location is: [address] [place] [address]  The patient phone number is: [phone]   Visit type: Virtual visit with synchronous audio and video  Each patient to whom he or she provides medical services by telemedicine is:  (1) informed of the relationship between the practitioner and patient and the respective role of any other health care provider with respect to management of the patient; and (2) notified that he or she may decline to receive medical services by telemedicine and may withdraw from such care at any time.    Chief Complaint:  Christel Alejo is a 38 y.o. female who presents today for follow-up.  Met with patient.      HISTORY OF PRESENTING ILLNESS:  Christel Alejo is a 38 y.o. female with history of MDD, KAYODE, SAD, ADHD, insomnia who presents for follow up appointment.      7/2/2024: Ms. Alejo reports she quit her job last week on 6/24 d/t ongoing conflict in the workplace.     She reports feeling tense and frustrated recently and notes things are setting her off more quickly than usual.   She notes she continues to feel some anxiety and angst, despite quitting over 1 week ago.  She has had difficulty motivating herself. "It was difficult to be "on" all the time while at work."  She notes significant fatigue that is affecting her functioning.   Doxepin was started at last visit for insomnia, however this did not provide relief. Has switched back to trazodone; this previously caused AM drowsiness. Is taking a quarter of a tablet of a 50 mg trazodone at night without drowsiness in the morning.   Symptoms of ADHD continue to be well controlled with lisdexamfetamine 30 mg q.a.m..    Plan at last appointment:  Continue venlafaxine  mg daily for mood and anxiety - patient will switch administration to q.h.s. for 1 week as a test to see if this " "will improve daytime fatigue  If daytime fatigue is not improved after 1 week patient will start bupropion  mg q.a.m. for residual symptoms of MDD including fatigue  Continue lisdexamfetamine 30 mg q.a.m. for ADHD  Continue trazodone 12.5 mg q.h.s. p.r.n. insomnia (patient is breaking a 50 mg tablet into 4 pieces.  25 mg causes a.m. drowsiness)   Offered referral for individual psychotherapy.    Psychotropic medication history:   Zoloft 50 mg daily, Paxil (nausea), viibryd 40 mg daily  xanax 0.25 mg p.r.n. anxiety (effective, hasn't needed)  Trazodone (effective)  Not escitalopram, prozac, effexor, cymbalta, fluoxetine, bupropion       INTERVAL HISTORY:    Pt states her mood has been "good. Balanced." She states things with her  and children are going well.    She did switch venlafaxine to q.h.s. as discussed at last visit, however this did not improve daytime fatigue so she started bupropion  mg daily. She has not noticed improvement in fatigue. She denies adverse effects after starting bupropion.    Vyvanse some days it makes her too hyperfocused and other days it's good    Has joined a gym, is going 3-4 days a week which has improved her energy     She reports she started a new job 3 weeks ago and is enjoying it.  She is working from home which equates to less stress.    Symptoms of ADHD continue to be well controlled with lisdexamfetamine 30 mg q.a.m..    Is patient experiencing or having changes in:  Trouble with sleep:  sleeping through the night   Appetite changes:  no  Weight changes:  no  Lack of energy: improving  Anhedonia: improved motivation  Somatic symptoms:  no  Anxiety/panic:  continues to improve  Irritability: no  Guilty/hopeless:  no  Concentration: no  Racing thoughts: no  Impulsive behaviors: no  Paranoia/AVH: no  Self-injurious behavior/risky behavior:  no  Any drugs:  no  Alcohol:  no    No interval episodes with symptoms consistent with eric or hypomania.  Denied interval " or current suicidal/homicidal thoughts, intent, or plan or NSSI.  Denied other questions and concerns.  Patient reports feeling stable and wishing to continue current management unchanged.    Medication side effects: None Pt denies cardiovascular events including increased heart rate or increased blood pressure, palpitations, chest pain, dizziness, lightheadedness, or syncopal episodes. Pt denies A/V hallucinations or behavioral effects. Pt denies anorexia, decreased appetite, xerostomia, headache, insomnia, or digital changes.  Medication adherence: yes    MEDICAL REVIEW OF SYSTEMS:   Pain: Denies any significant chronic or acute pain.  Constitutional: Denies fever or change in appetite.  Cardiovascular: Denies chest pain or exertional dyspnea.  Respiratory: Denies cough or orthopnea.   GI: Denies abdominal pain, N/V  Neurological: Denies tremor, seizure, or focal weakness.  Psychiatric: See HPI above.    PAST PSYCHIATRIC, MEDICAL, AND SOCIAL HISTORY REVIEWED  The patient's past medical, family and social history have been reviewed and updated as appropriate within the electronic medical record - see encounter notes.    PAST MEDICAL HISTORY:   Past Medical History:   Diagnosis Date    Allergy     Anxiety     Depression     History of cervical dysplasia     s/p conization followers with Dr Vargas    S/P LASIK surgery of both eyes     Head trauma/Loss of consciousness: denies  Seizures: denies     PAST PSYCHIATRIC HISTORY:  Psychiatric Care (current & past):  Dr Bhagat for treatment of ADHD, patient's OBGYN prescribed antidepressants in the past  Previous Psychiatric Diagnoses:  ADHD, KAYODE, MDD  Previous Psychiatric Hospitalizations: denies  Previous SI/HI:  denies  Previous Suicide Attempts or NSSI: denies  History of Psychotherapy: denies  History of Violence: denies    FAMILY HISTORY:   Paternal: abusive alcoholic father  Maternal: no psychiatric history or history of substance abuse or suicide  Children:  ADHD      SOCIAL HISTORY:   Marital Status/Relationship Status:  x 19 years  Children: 2 children, boys, 14 and 13   Resides/Housing Status: [place]   Occupation/Employment:   Hobbies/Recreational Activities: crafting, shopping    Spirituality/Holiness: raised Nondenominational, not practicing  Education level: HS grad   History: denies  Legal History: denies  Access to firearms: yes, locked in a safe,  is a gregory     SUBSTANCE USE HISTORY:  Caffeine: coffee 1 large cup in AM  Tobacco: denies  Alcohol: occasionally  Other Substances: denies  Rehab: denies  Detoxes:  denies      MEDICATIONS:    Current Outpatient Medications:     atorvastatin (LIPITOR) 10 MG tablet, Take 1 tablet (10 mg total) by mouth once daily., Disp: 90 tablet, Rfl: 3    buPROPion (WELLBUTRIN XL) 150 MG TB24 tablet, Take 1 tablet (150 mg total) by mouth once daily., Disp: 90 tablet, Rfl: 0    cetirizine (ZYRTEC) 10 MG tablet, Take 10 mg by mouth once daily., Disp: , Rfl:     EPINEPHrine (EPIPEN) 0.3 mg/0.3 mL AtIn, Inject 0.3 mLs (0.3 mg total) into the muscle once. for 1 dose, Disp: 0.3 mL, Rfl: 0    INTRAUTERINE DEVICE, IUD, IU, by Intrauterine route., Disp: , Rfl:     levonorgestreL (MIRENA) 20 mcg/24 hours (7 yrs) 52 mg IUD, Mirena 20 mcg/24 hours (7 yrs) 52 mg intrauterine device  Take by intrauterine route., Disp: , Rfl:     lisdexamfetamine (VYVANSE) 30 MG capsule, Take 1 capsule (30 mg total) by mouth every morning., Disp: 30 capsule, Rfl: 0    [START ON 9/27/2024] lisdexamfetamine (VYVANSE) 30 MG capsule, Take 1 capsule (30 mg total) by mouth every morning., Disp: 30 capsule, Rfl: 0    [START ON 10/27/2024] lisdexamfetamine (VYVANSE) 30 MG capsule, Take 1 capsule (30 mg total) by mouth every morning., Disp: 30 capsule, Rfl: 0    traZODone (DESYREL) 50 MG tablet, Take 1 tablet (50 mg total) by mouth every evening., Disp: 90 tablet, Rfl: 0    venlafaxine (EFFEXOR-XR) 150 MG Cp24, Take 1 capsule (150 mg total) by  "mouth once daily., Disp: 90 capsule, Rfl: 1    ALLERGIES:  Review of patient's allergies indicates:   Allergen Reactions    Paxil [paroxetine hcl] Nausea Only       EXAM:  Constitutional  Vitals:  Most recent vital signs were reviewed.   Last 3 sets of VS:      11/29/2023     5:03 PM 1/24/2024     6:25 PM 4/2/2024     1:28 PM   Vitals - 1 value per visit   SYSTOLIC 124 138 131   DIASTOLIC 87 85 92   Pulse 100 96 80   Temp 98.8 °F (37.1 °C) 98.7 °F (37.1 °C) 98.6 °F (37 °C)   Resp 18 18 20   SPO2 98 % 97 % 98 %   Weight (lb) 191 191 191   Weight (kg) 86.637 86.637 86.637   Height 5' 5" (1.651 m) 5' 5" (1.651 m) 5' 5" (1.651 m)   BMI (Calculated) 31.8 31.8 31.8   Pain Score  Zero Zero      General:  unremarkable, age appropriate     Musculoskeletal  Muscle Strength/Tone:  no tremor or abnormal movements   Gait & Station:  Steady, non-ataxic     Psychiatric  Speech:  no latency; no press   Mood & Affect:  euthymic  congruent and appropriate   Thought Process:  normal and logical   Associations:  intact   Thought Content:  normal, no suicidality, no homicidality, delusions, or paranoia   Insight:  intact   Judgement: behavior is adequate to circumstances   Orientation:  grossly intact   Memory: intact for content of interview   Language: grossly intact   Attention Span & Concentration:  Intact to interview   Fund of Knowledge:  Not formally tested     SUICIDE RISK ASSESSMENT:  Protective factors: age, gender, no prior attempts, no prior hospitalizations, no family h/o attempts, no ongoing substance abuse, no psychosis, , has children, denies SI/intent/plan, seeking treatment, access to treatment, future oriented, good primary support  Risks:  Ongoing depression anxiety, history of ADHD, access to firearms  Patient is a low immediate and long-term risk considering risk factors.      RELEVANT LABS/STUDIES:    Lab Results   Component Value Date    WBC 6.29 10/06/2023    HGB 13.9 10/06/2023    HCT 42.1 10/06/2023    " MCV 89 10/06/2023     10/06/2023     BMP  Lab Results   Component Value Date     10/06/2023    K 4.6 10/06/2023     10/06/2023    CO2 24 10/06/2023    BUN 10 10/06/2023    CREATININE 0.7 10/06/2023    CALCIUM 9.4 10/06/2023    ANIONGAP 11 10/06/2023    ESTGFRAFRICA >60.0 03/05/2021    EGFRNONAA >60.0 03/05/2021     Lab Results   Component Value Date    ALT 32 10/06/2023    AST 22 10/06/2023    ALKPHOS 62 10/06/2023    BILITOT 0.7 10/06/2023     Lab Results   Component Value Date    TSH 1.507 10/06/2023     Lab Results   Component Value Date    HGBA1C 5.2 10/06/2023     Lab Results   Component Value Date    CHOL 277 (H) 10/06/2023    TRIG 193 (H) 10/06/2023    HDL 60 10/06/2023    LDLCALC 178.4 (H) 10/06/2023    CHOLHDL 21.7 10/06/2023    TOTALCHOLEST 4.6 10/06/2023       IMPRESSION:    Christel Alejo is a 38 y.o. female with history of MDD, KAYODE, SAD, ADHD, insomnia, who presents for follow up appointment.    Status/Progress: Based on the examination today, the patient's problem(s) is/are improved and well controlled.  New problems have not been presented today.   Co-morbidities are complicating management of the primary condition.  There are no active rule-out diagnoses for this patient at this time.     Risk Parameters:  Patient reports no suicidal ideation  Patient reports no homicidal ideation  Patient reports no self-injurious behavior  Patient reports no violent behavior    DIAGNOSES:    ICD-10-CM ICD-9-CM   1. Recurrent major depressive disorder, in partial remission  F33.41 296.35   2. KAYODE (generalized anxiety disorder)  F41.1 300.02   3. Attention deficit hyperactivity disorder (ADHD), combined type  F90.2 314.01   4. Social anxiety disorder  F40.10 300.23   5. Primary insomnia  F51.01 307.42         PLAN:  Continue venlafaxine  mg daily for mood and anxiety  Continue bupropion  mg q.a.m. for residual symptoms of MDD including fatigue  Continue lisdexamfetamine 30 mg  q.a.m. for ADHD  Continue trazodone 12.5 mg q.h.s. p.r.n. insomnia (patient is breaking a 50 mg tablet into 4 pieces.  25 mg causes a.m. drowsiness)   Offered referral for individual psychotherapy.    RETURN TO CLINIC:   3 months      KARL Bergman, PMHNP-BC    30 minutes of total time spent on the encounter, which includes face to face time and non-face to face time preparing to see the patient (eg. review of tests), obtaining and/or reviewing separately obtained history, documenting clinical information in the electronic health record, independently interpreting results (not separately reported), and communicating results to the patient/family/caregiver, or care coordination (not separately reported).     Visit today included managing the longitudinal care of the patient due to the serious and/or complex managed problem(s) MDD, KAYODE, ADHD, social anxiety disorder, insomnia.    At this time there are no indications the patient represents an imminent danger to either themselves or others; will continue to manage treatment in the outpatient setting.    I discussed the patient's care with the patient including benefits, alternatives, possible adverse effects of the treatment plan; including the potential for metabolic complications, major organ dysfunction, black box warnings, and contraindications. The opportunity was given for questions/clarification, and after this discussion the above treatment plan was devised through shared decision making. The patient voiced their understanding of the diagnoses and treatments listed above and agreed to the treatment plan. Follow up plan was reviewed with the patient. The patient was advised to call to report any worsening of symptoms or problems with medication.    Supportive therapy and psychoeducation provided. I discussed the importance of regular exercise, maintenance of a healthy weight, balanced diet rich in fruits/vegetables and lean protein, and avoidance of  "unhealthy habits like smoking and excessive alcohol intake.     Patient has been given crisis information including Suicide and Crisis Lifeline (call or text: 362). Patient also given instructions to go to the nearest ER or call 911 if unable to remain safe or if the Pt develops thoughts of harming self or others.    [facility]: Reviewed today to detect potential controlled substance misuse, diversion, excessive prescribing, or multiple providers prescribing controlled substances. The patients report was deemed appropriate without new medications of concern prescribed by other providers.    Documentation entered by me for this encounter may have been done in part using [employer] Direct voice recognition transcription software. Garbled syntax, mangled pronouns, and other bizarre constructions may be attributed to that software system. Although I have made an effort to ensure accuracy, "sound like" errors may exist and should be interpreted in context.    "

## 2024-11-06 ENCOUNTER — TELEPHONE (OUTPATIENT)
Dept: RADIOLOGY | Facility: HOSPITAL | Age: 39
End: 2024-11-06
Payer: COMMERCIAL

## 2024-11-06 NOTE — TELEPHONE ENCOUNTER
I left a message for patient to call back to schedule follow up diagnostic breast imaging due in November.

## 2024-11-20 ENCOUNTER — HOSPITAL ENCOUNTER (OUTPATIENT)
Dept: RADIOLOGY | Facility: HOSPITAL | Age: 39
Discharge: HOME | End: 2024-11-20
Attending: INTERNAL MEDICINE
Payer: COMMERCIAL

## 2024-11-20 DIAGNOSIS — R92.8 ABNORMAL MAMMOGRAM: ICD-10-CM

## 2024-11-20 PROCEDURE — 77062 BREAST TOMOSYNTHESIS BI: CPT | Mod: 26,,, | Performed by: RADIOLOGY

## 2024-11-20 PROCEDURE — 76642 ULTRASOUND BREAST LIMITED: CPT | Mod: 26,LT,, | Performed by: RADIOLOGY

## 2024-11-20 PROCEDURE — 77066 DX MAMMO INCL CAD BI: CPT | Mod: 26,,, | Performed by: RADIOLOGY

## 2024-11-20 PROCEDURE — 76642 ULTRASOUND BREAST LIMITED: CPT | Mod: TC,PO,LT

## 2024-11-20 PROCEDURE — 77062 BREAST TOMOSYNTHESIS BI: CPT | Mod: TC,PO

## 2024-11-26 DIAGNOSIS — F90.2 ATTENTION DEFICIT HYPERACTIVITY DISORDER (ADHD), COMBINED TYPE: Primary | ICD-10-CM

## 2024-11-27 RX ORDER — LISDEXAMFETAMINE DIMESYLATE 30 MG/1
30 CAPSULE ORAL EVERY MORNING
Qty: 30 CAPSULE | Refills: 0 | Status: SHIPPED | OUTPATIENT
Start: 2024-11-27 | End: 2024-12-27

## 2024-11-27 RX ORDER — TRAZODONE HYDROCHLORIDE 50 MG/1
50 TABLET ORAL NIGHTLY
Qty: 90 TABLET | Refills: 0 | Status: SHIPPED | OUTPATIENT
Start: 2024-11-27 | End: 2025-11-27

## 2024-11-27 RX ORDER — VENLAFAXINE HYDROCHLORIDE 150 MG/1
150 CAPSULE, EXTENDED RELEASE ORAL DAILY
Qty: 90 CAPSULE | Refills: 1 | Status: SHIPPED | OUTPATIENT
Start: 2024-11-27

## 2024-12-03 ENCOUNTER — OFFICE VISIT (OUTPATIENT)
Dept: PSYCHIATRY | Facility: CLINIC | Age: 39
End: 2024-12-03
Payer: COMMERCIAL

## 2024-12-03 DIAGNOSIS — F90.2 ATTENTION DEFICIT HYPERACTIVITY DISORDER (ADHD), COMBINED TYPE: ICD-10-CM

## 2024-12-03 DIAGNOSIS — F33.41 RECURRENT MAJOR DEPRESSIVE DISORDER, IN PARTIAL REMISSION: Primary | ICD-10-CM

## 2024-12-03 DIAGNOSIS — F41.1 GAD (GENERALIZED ANXIETY DISORDER): ICD-10-CM

## 2024-12-03 DIAGNOSIS — F51.01 PRIMARY INSOMNIA: ICD-10-CM

## 2024-12-03 DIAGNOSIS — F40.10 SOCIAL ANXIETY DISORDER: ICD-10-CM

## 2024-12-03 PROCEDURE — 1160F RVW MEDS BY RX/DR IN RCRD: CPT | Mod: CPTII,95,,

## 2024-12-03 PROCEDURE — G2211 COMPLEX E/M VISIT ADD ON: HCPCS | Mod: 95,,,

## 2024-12-03 PROCEDURE — 99215 OFFICE O/P EST HI 40 MIN: CPT | Mod: 95,,,

## 2024-12-03 PROCEDURE — 1159F MED LIST DOCD IN RCRD: CPT | Mod: CPTII,95,,

## 2024-12-03 RX ORDER — LISDEXAMFETAMINE DIMESYLATE 40 MG/1
40 CAPSULE ORAL EVERY MORNING
Qty: 30 CAPSULE | Refills: 0 | Status: SHIPPED | OUTPATIENT
Start: 2024-12-03 | End: 2025-01-02

## 2024-12-03 NOTE — PROGRESS NOTES
"OUTPATIENT PSYCHIATRY FOLLOW UP VISIT    Encounter Date: 12/3/2024    Clinical Status of Patient:  Outpatient (Virtual)  The patient location is: [address] [place] [address]  The patient phone number is: [phone]   Visit type: Virtual visit with synchronous audio and video  Each patient to whom he or she provides medical services by telemedicine is:  (1) informed of the relationship between the practitioner and patient and the respective role of any other health care provider with respect to management of the patient; and (2) notified that he or she may decline to receive medical services by telemedicine and may withdraw from such care at any time.    Chief Complaint:  Christel Alejo is a 39 y.o. female who presents today for follow-up.  Met with patient.      HISTORY OF PRESENTING ILLNESS:  Christel Alejo is a 39 y.o. female with history of MDD, KAYODE, SAD, ADHD, insomnia who presents for follow up appointment.      Plan at last appointment:  Continue venlafaxine  mg daily for mood and anxiety  Continue bupropion  mg q.a.m. for residual symptoms of MDD including fatigue  Continue lisdexamfetamine 30 mg q.a.m. for ADHD  Continue trazodone 12.5 mg q.h.s. p.r.n. insomnia (patient is breaking a 50 mg tablet into 4 pieces.  25 mg causes a.m. drowsiness)   Offered referral for individual psychotherapy.    Psychotropic medication history:   Zoloft 50 mg daily, Paxil (nausea), viibryd 40 mg daily  xanax 0.25 mg p.r.n. anxiety (effective, hasn't needed)  Trazodone (effective)  Not escitalopram, prozac, effexor, cymbalta, fluoxetine, bupropion       INTERVAL HISTORY:    Pt reports her mood has been stable.    Has been going to the gym for exercise and has been working on improving her diet. Energy has improved with exercise.     Taking venlafaxine  mg daily. She notes if she forgets to take her morning dose she becomes "ruvalcaba" and has increased irritability.     Has " auto-discontinued bupropion XL. She notes no change in mood after discontinuing.    Continues lisdexamfetamine 30 mg daily.  She does reports afternoon breakthrough symptoms including difficulty concentrating.    Reports continued lack of motivation, possible burn out    Is patient experiencing or having changes in:  Trouble with sleep:  sleeping well  Appetite changes:  no  Weight changes:  no  Lack of energy: improving  Anhedonia: no  Somatic symptoms:  no  Anxiety/panic:  Well controlled  Irritability: no  Guilty/hopeless:  no  Concentration: no  Racing thoughts: no  Impulsive behaviors: no  Paranoia/AVH: no  Self-injurious behavior/risky behavior:  no  Any drugs:  no  Alcohol:  no    No interval episodes with symptoms consistent with eric or hypomania.  Denied interval or current suicidal/homicidal thoughts, intent, or plan or NSSI.  Denied other questions and concerns.  Patient reports feeling stable.    Medication side effects: None Pt denies cardiovascular events including increased heart rate or increased blood pressure, palpitations, chest pain, dizziness, lightheadedness, or syncopal episodes. Pt denies A/V hallucinations or behavioral effects. Pt denies anorexia, decreased appetite, xerostomia, headache, insomnia, or digital changes.  Medication adherence: yes    MEDICAL REVIEW OF SYSTEMS:   Pain: Denies any significant chronic or acute pain.  Constitutional: Denies fever or change in appetite.  Cardiovascular: Denies chest pain or exertional dyspnea.  Respiratory: Denies cough or orthopnea.   GI: Denies abdominal pain, N/V  Neurological: Denies tremor, seizure, or focal weakness.  Psychiatric: See HPI above.    PAST PSYCHIATRIC, MEDICAL, AND SOCIAL HISTORY REVIEWED  The patient's past medical, family and social history have been reviewed and updated as appropriate within the electronic medical record - see encounter notes.    PAST MEDICAL HISTORY:   Past Medical History:   Diagnosis Date    Allergy      "Anxiety     Depression     History of cervical dysplasia     s/p conization followers with Dr Vargas    S/P LASIK surgery of both eyes     Head trauma/Loss of consciousness: denies  Seizures: denies     PAST PSYCHIATRIC HISTORY:  Psychiatric Care (current & past):  Dr Bhagat for treatment of ADHD, patient's OBGYN prescribed antidepressants in the past  Previous Psychiatric Diagnoses:  ADHD, KAYODE, MDD  Previous Psychiatric Hospitalizations: denies  Previous SI/HI:  denies  Previous Suicide Attempts or NSSI: denies  History of Psychotherapy: denies  History of Violence: denies    FAMILY HISTORY:   Paternal: abusive alcoholic father  Maternal: no psychiatric history or history of substance abuse or suicide  Children:  ADHD     SOCIAL HISTORY:   Marital Status/Relationship Status:  x 19 years  Children: 2 children, boys, 14 and 13   Resides/Housing Status: [place]   Occupation/Employment: [employer]  Hobbies/Recreational Activities: crafting, shopping    Spirituality/Episcopalian: raised Caodaism, not practicing  Education level: HS grad   History: denies  Legal History: denies  Access to firearms: yes, locked in a safe,  is a gregory     SUBSTANCE USE HISTORY:  Caffeine: coffee 1 large cup in AM  Tobacco: denies  Alcohol: occasionally  Other Substances: denies  Rehab: denies  Detoxes:  denies    EXAM:  Constitutional  Vitals:  Most recent vital signs were reviewed.   Last 3 sets of VS:      11/29/2023     5:03 PM 1/24/2024     6:25 PM 4/2/2024     1:28 PM   Vitals - 1 value per visit   SYSTOLIC 124 138 131   DIASTOLIC 87 85 92   Pulse 100 96 80   Temp 98.8 °F (37.1 °C) 98.7 °F (37.1 °C) 98.6 °F (37 °C)   Resp 18 18 20   SPO2 98 % 97 % 98 %   Weight (lb) 191 191 191   Weight (kg) 86.637 86.637 86.637   Height 5' 5" (1.651 m) 5' 5" (1.651 m) 5' 5" (1.651 m)   BMI (Calculated) 31.8 31.8 31.8   Pain Score  Zero Zero      General:  unremarkable, age appropriate     Musculoskeletal  Muscle " Strength/Tone:  no tremor or abnormal movements   Gait & Station:  Steady, non-ataxic     Psychiatric  Speech:  no latency; no press   Mood & Affect:  euthymic  congruent and appropriate   Thought Process:  normal and logical   Associations:  intact   Thought Content:  normal, no suicidality, no homicidality, delusions, or paranoia   Insight:  intact   Judgement: behavior is adequate to circumstances   Orientation:  grossly intact   Memory: intact for content of interview   Language: grossly intact   Attention Span & Concentration:  Intact to interview   Fund of Knowledge:  Not formally tested     SUICIDE RISK ASSESSMENT:  Protective factors: age, gender, no prior attempts, no prior hospitalizations, no family h/o attempts, no ongoing substance abuse, no psychosis, , has children, denies SI/intent/plan, seeking treatment, access to treatment, future oriented, good primary support  Risks:  Ongoing depression anxiety, history of ADHD, access to firearms  Patient is a low immediate and long-term risk considering risk factors.      RELEVANT LABS/STUDIES:    Lab Results   Component Value Date    WBC 6.29 10/06/2023    HGB 13.9 10/06/2023    HCT 42.1 10/06/2023    MCV 89 10/06/2023     10/06/2023     BMP  Lab Results   Component Value Date     10/06/2023    K 4.6 10/06/2023     10/06/2023    CO2 24 10/06/2023    BUN 10 10/06/2023    CREATININE 0.7 10/06/2023    CALCIUM 9.4 10/06/2023    ANIONGAP 11 10/06/2023    ESTGFRAFRICA >60.0 03/05/2021    EGFRNONAA >60.0 03/05/2021     Lab Results   Component Value Date    ALT 32 10/06/2023    AST 22 10/06/2023    ALKPHOS 62 10/06/2023    BILITOT 0.7 10/06/2023     Lab Results   Component Value Date    TSH 1.507 10/06/2023     Lab Results   Component Value Date    HGBA1C 5.2 10/06/2023     Lab Results   Component Value Date    CHOL 277 (H) 10/06/2023    TRIG 193 (H) 10/06/2023    HDL 60 10/06/2023    LDLCALC 178.4 (H) 10/06/2023    CHOLHDL 21.7 10/06/2023     TOTALCHOLEST 4.6 10/06/2023       IMPRESSION:    Christel Alejo is a 39 y.o. female with history of MDD, KAYODE, SAD, ADHD, insomnia, who presents for follow up appointment.    Status/Progress: Based on the examination today, the patient's problem(s) is/are adequately but not ideally controlled.  New problems have not been presented today.   Co-morbidities are complicating management of the primary condition.  There are no active rule-out diagnoses for this patient at this time.     Risk Parameters:  Patient reports no suicidal ideation  Patient reports no homicidal ideation  Patient reports no self-injurious behavior  Patient reports no violent behavior    DIAGNOSES:    ICD-10-CM ICD-9-CM   1. Recurrent major depressive disorder, in partial remission  F33.41 296.35   2. KAYODE (generalized anxiety disorder)  F41.1 300.02   3. Attention deficit hyperactivity disorder (ADHD), combined type  F90.2 314.01   4. Social anxiety disorder  F40.10 300.23   5. Primary insomnia  F51.01 307.42       PLAN:  Continue venlafaxine  mg daily for mood and anxiety  Continue bupropion  mg q.a.m. for residual symptoms of MDD including fatigue  Increase lisdexamfetamine from 30 to 40 mg q.a.m. for breakthrough symptoms of ADHD  Continue trazodone 12.5 mg q.h.s. p.r.n. insomnia (patient is breaking a 50 mg tablet into 4 pieces.  25 mg causes a.m. drowsiness)   Referral placed for individual psychotherapy    RETURN TO CLINIC:   3 months      KARL Bergman, PMHNP-BC    44 minutes of total time spent on the encounter, which includes face to face time and non-face to face time preparing to see the patient (eg. review of tests), obtaining and/or reviewing separately obtained history, documenting clinical information in the electronic health record, independently interpreting results (not separately reported), and communicating results to the patient/family/caregiver, or care coordination (not separately reported).  "    Visit today included managing the longitudinal care of the patient due to the serious and/or complex managed problem(s) MDD, KAYODE, ADHD, social anxiety disorder, insomnia.    At this time there are no indications the patient represents an imminent danger to either themselves or others; will continue to manage treatment in the outpatient setting.    I discussed the patient's care with the patient including benefits, alternatives, possible adverse effects of the treatment plan; including the potential for metabolic complications, major organ dysfunction, black box warnings, and contraindications. The opportunity was given for questions/clarification, and after this discussion the above treatment plan was devised through shared decision making. The patient voiced their understanding of the diagnoses and treatments listed above and agreed to the treatment plan. Follow up plan was reviewed with the patient. The patient was advised to call to report any worsening of symptoms or problems with medication.    Patient has been given crisis information including Suicide and Crisis Lifeline (call or text: 435). Patient also given instructions to go to the nearest ER or call 911 if unable to remain safe or if the Pt develops thoughts of harming self or others.    [facility]: Reviewed today to detect potential controlled substance misuse, diversion, excessive prescribing, or multiple providers prescribing controlled substances. The patients report was deemed appropriate without new medications of concern prescribed by other providers.    Documentation entered by me for this encounter may have been done in part using [employer] voice recognition transcription software. Garbled syntax, mangled pronouns, and other bizarre constructions may be attributed to that software system. Although I have made an effort to ensure accuracy, "sound like" errors may exist and should be interpreted in context.    "

## 2025-01-06 ENCOUNTER — OFFICE VISIT (OUTPATIENT)
Dept: PSYCHIATRY | Facility: CLINIC | Age: 40
End: 2025-01-06
Payer: COMMERCIAL

## 2025-01-06 DIAGNOSIS — F33.40 RECURRENT MAJOR DEPRESSIVE DISORDER, IN REMISSION: Primary | ICD-10-CM

## 2025-01-06 DIAGNOSIS — F41.1 GAD (GENERALIZED ANXIETY DISORDER): ICD-10-CM

## 2025-01-06 DIAGNOSIS — F90.2 ATTENTION DEFICIT HYPERACTIVITY DISORDER (ADHD), COMBINED TYPE: ICD-10-CM

## 2025-01-06 RX ORDER — LISDEXAMFETAMINE DIMESYLATE 40 MG/1
40 CAPSULE ORAL EVERY MORNING
Qty: 30 CAPSULE | Refills: 0 | Status: SHIPPED | OUTPATIENT
Start: 2025-01-06 | End: 2025-02-05

## 2025-01-06 RX ORDER — LISDEXAMFETAMINE DIMESYLATE 40 MG/1
40 CAPSULE ORAL EVERY MORNING
Qty: 30 CAPSULE | Refills: 0 | Status: SHIPPED | OUTPATIENT
Start: 2025-03-07 | End: 2025-01-10

## 2025-01-06 RX ORDER — LISDEXAMFETAMINE DIMESYLATE 40 MG/1
40 CAPSULE ORAL EVERY MORNING
Qty: 30 CAPSULE | Refills: 0 | Status: SHIPPED | OUTPATIENT
Start: 2025-02-05 | End: 2025-01-10

## 2025-01-06 NOTE — PROGRESS NOTES
OUTPATIENT PSYCHIATRY FOLLOW UP VISIT    Encounter Date: 1/6/2025    Clinical Status of Patient:  Outpatient (Virtual)  The patient location is: [address] [place] [address]  The patient phone number is: [phone]   Visit type: Virtual visit with synchronous audio and video  Each patient to whom he or she provides medical services by telemedicine is:  (1) informed of the relationship between the practitioner and patient and the respective role of any other health care provider with respect to management of the patient; and (2) notified that he or she may decline to receive medical services by telemedicine and may withdraw from such care at any time.    Chief Complaint:  Alba Trivedi is a 39 y.o. female who presents today for follow-up.  Met with patient.      HISTORY OF PRESENTING ILLNESS:  Alba Trivedi is a 39 y.o. female with history of MDD, SAHARA, SAD, ADHD, insomnia who presents for follow up appointment.      Plan at last appointment:  Continue venlafaxine  mg daily for mood and anxiety  Auto discontinued bupropion  mg q.a.m.   Increase lisdexamfetamine from 30 to 40 mg q.a.m. for breakthrough symptoms of ADHD  Continue trazodone 12.5 mg q.h.s. p.r.n. insomnia (patient is breaking a 50 mg tablet into 4 pieces.  25 mg causes a.m. drowsiness)   Referral placed for individual psychotherapy    Psychotropic medication history:   Zoloft 50 mg daily, Paxil (nausea), viibryd 40 mg daily  xanax 0.25 mg p.r.n. anxiety (effective, hasn't needed)  Trazodone (effective)  Not escitalopram, prozac, effexor, cymbalta, fluoxetine, bupropion       INTERVAL HISTORY:    Lisdexamfetamine was increased from 30-40 mg daily for afternoon breakthrough symptoms of ADHD.  Today, patient reports improvement in ADHD symptoms and notes these symptoms are now controlled throughout the day.  She denies side effects with increase in dose.    She reports her mood continues to be stable.  Anxiety is well  controlled.    She does report continued fatigue and also notes hair thinning.  She continues to exercise on a regular basis and is taking a multi vitamin.    Is patient experiencing or having changes in:  Trouble with sleep:  sleeping well  Appetite changes:  no  Weight changes:  no  Lack of energy: improving  Anhedonia: no  Somatic symptoms:  no  Anxiety/panic:  Well controlled  Irritability: no  Guilty/hopeless:  no  Concentration: no  Racing thoughts: no  Impulsive behaviors: no  Paranoia/AVH: no  Self-injurious behavior/risky behavior:  no  Any drugs:  no  Alcohol:  no    No interval episodes with symptoms consistent with jatinder or hypomania.  Denied interval or current suicidal/homicidal thoughts, intent, or plan or NSSI.  Denied other questions and concerns.  Patient reports feeling stable.    Medication side effects: None Pt denies cardiovascular events including increased heart rate or increased blood pressure, palpitations, chest pain, dizziness, lightheadedness, or syncopal episodes. Pt denies A/V hallucinations or behavioral effects. Pt denies anorexia, decreased appetite, xerostomia, headache, insomnia, or digital changes.  Medication adherence: yes    MEDICAL REVIEW OF SYSTEMS:   Pain: Denies any significant chronic or acute pain.  Constitutional: Denies fever or change in appetite.  Cardiovascular: Denies chest pain or exertional dyspnea.  Respiratory: Denies cough or orthopnea.   GI: Denies abdominal pain, N/V  Neurological: Denies tremor, seizure, or focal weakness.  Psychiatric: See HPI above.    PAST PSYCHIATRIC, MEDICAL, AND SOCIAL HISTORY REVIEWED  The patient's past medical, family and social history have been reviewed and updated as appropriate within the electronic medical record - see encounter notes.    PAST MEDICAL HISTORY:   Past Medical History:   Diagnosis Date    Allergy     Anxiety     Depression     History of cervical dysplasia     s/p conization followers with Dr Suazo    S/P BEATRIZ  "surgery of both eyes     Head trauma/Loss of consciousness: denies  Seizures: denies     PAST PSYCHIATRIC HISTORY:  Psychiatric Care (current & past):  Dr Self for treatment of ADHD, patient's OBGYN prescribed antidepressants in the past  Previous Psychiatric Diagnoses:  ADHD, SAHARA, MDD  Previous Psychiatric Hospitalizations: denies  Previous SI/HI:  denies  Previous Suicide Attempts or NSSI: denies  History of Psychotherapy: denies  History of Violence: denies    FAMILY HISTORY:   Paternal: abusive alcoholic father  Maternal: no psychiatric history or history of substance abuse or suicide  Children:  ADHD     SOCIAL HISTORY:   Marital Status/Relationship Status:  x 19 years  Children: 2 children, boys, 14 and 13   Resides/Housing Status: [place]   Occupation/Employment:   Hobbies/Recreational Activities: crafting, shopping    Spirituality/Yazidi: raised Sikh, not practicing  Education level: HS grad   History: denies  Legal History: denies  Access to firearms: yes, locked in a safe,  is a jaime     SUBSTANCE USE HISTORY:  Caffeine: coffee 1 large cup in AM  Tobacco: denies  Alcohol: occasionally  Other Substances: denies  Rehab: denies  Detoxes:  denies    EXAM:  Constitutional  Vitals:  Most recent vital signs were reviewed.   Last 3 sets of VS:      11/29/2023     5:03 PM 1/24/2024     6:25 PM 4/2/2024     1:28 PM   Vitals - 1 value per visit   SYSTOLIC 124 138 131   DIASTOLIC 87 85 92   Pulse 100 96 80   Temp 98.8 °F (37.1 °C) 98.7 °F (37.1 °C) 98.6 °F (37 °C)   Resp 18 18 20   SPO2 98 % 97 % 98 %   Weight (lb) 191 191 191   Weight (kg) 86.637 86.637 86.637   Height 5' 5" (1.651 m) 5' 5" (1.651 m) 5' 5" (1.651 m)   BMI (Calculated) 31.8 31.8 31.8   Pain Score  Zero Zero      General:  unremarkable, age appropriate     Musculoskeletal  Muscle Strength/Tone:  No tremors appreciated   Gait & Station:  Pt seated during virtual visit     Psychiatric  Speech:  no " latency; no press   Mood & Affect:  euthymic  congruent and appropriate   Thought Process:  normal and logical   Associations:  intact   Thought Content:  normal, no suicidality, no homicidality, delusions, or paranoia   Insight:  intact   Judgement: behavior is adequate to circumstances   Orientation:  grossly intact   Memory: intact for content of interview   Language: grossly intact   Attention Span & Concentration:  Intact to interview   Fund of Knowledge:  Not formally tested     SUICIDE RISK ASSESSMENT:  Protective factors: age, gender, no prior attempts, no prior hospitalizations, no family h/o attempts, no ongoing substance abuse, no psychosis, , has children, denies SI/intent/plan, seeking treatment, access to treatment, future oriented, good primary support  Risks:  Ongoing depression anxiety, history of ADHD, access to firearms  Patient is a low immediate and long-term risk considering risk factors.      RELEVANT LABS/STUDIES:    Lab Results   Component Value Date    WBC 6.29 10/06/2023    HGB 13.9 10/06/2023    HCT 42.1 10/06/2023    MCV 89 10/06/2023     10/06/2023     BMP  Lab Results   Component Value Date     10/06/2023    K 4.6 10/06/2023     10/06/2023    CO2 24 10/06/2023    BUN 10 10/06/2023    CREATININE 0.7 10/06/2023    CALCIUM 9.4 10/06/2023    ANIONGAP 11 10/06/2023    ESTGFRAFRICA >60.0 03/05/2021    EGFRNONAA >60.0 03/05/2021     Lab Results   Component Value Date    ALT 32 10/06/2023    AST 22 10/06/2023    ALKPHOS 62 10/06/2023    BILITOT 0.7 10/06/2023     Lab Results   Component Value Date    TSH 1.507 10/06/2023     Lab Results   Component Value Date    HGBA1C 5.2 10/06/2023     Lab Results   Component Value Date    CHOL 277 (H) 10/06/2023    TRIG 193 (H) 10/06/2023    HDL 60 10/06/2023    LDLCALC 178.4 (H) 10/06/2023    CHOLHDL 21.7 10/06/2023    TOTALCHOLEST 4.6 10/06/2023       IMPRESSION:    Alba Trivedi is a 39 y.o. female with history of MDD,  SAHARA, SAD, ADHD, insomnia, who presents for follow up appointment.    Status/Progress: Based on the examination today, the patient's problem(s) is/are improved and well controlled.  New problems have not been presented today.   Co-morbidities are complicating management of the primary condition.  There are no active rule-out diagnoses for this patient at this time.     Risk Parameters:  Patient reports no suicidal ideation  Patient reports no homicidal ideation  Patient reports no self-injurious behavior  Patient reports no violent behavior    DIAGNOSES:    ICD-10-CM ICD-9-CM   1. Recurrent major depressive disorder, in remission  F33.40 296.35   2. SAHARA (generalized anxiety disorder)  F41.1 300.02   3. Attention deficit hyperactivity disorder (ADHD), combined type  F90.2 314.01       PLAN:  Continue venlafaxine  mg daily for mood and anxiety  Continue lisdexamfetamine 40 mg q.a.m. for breakthrough symptoms of ADHD  Continue trazodone 12.5 mg q.h.s. p.r.n. insomnia (patient is breaking a 50 mg tablet into 4 pieces.  25 mg causes a.m. drowsiness)   Referral placed for individual psychotherapy    RETURN TO CLINIC:   3 months      BRICE Yang, PMHNP-BC    30 minutes of total time spent on the encounter, which includes face to face time and non-face to face time preparing to see the patient (eg. review of tests), obtaining and/or reviewing separately obtained history, documenting clinical information in the electronic health record, independently interpreting results (not separately reported), and communicating results to the patient/family/caregiver, or care coordination (not separately reported).     Visit today included managing the longitudinal care of the patient due to the serious and/or complex managed problem(s) MDD, SAHARA, ADHD, social anxiety disorder, insomnia.    At this time there are no indications the patient represents an imminent danger to either themselves or others; will continue to manage  "treatment in the outpatient setting.    I discussed the patient's care with the patient including benefits, alternatives, possible adverse effects of the treatment plan; including the potential for metabolic complications, major organ dysfunction, black box warnings, and contraindications. The opportunity was given for questions/clarification, and after this discussion the above treatment plan was devised through shared decision making. The patient voiced their understanding of the diagnoses and treatments listed above and agreed to the treatment plan. Follow up plan was reviewed with the patient. The patient was advised to call to report any worsening of symptoms or problems with medication.    Patient has been given crisis information including Suicide and Crisis Lifeline (call or text: 107). Patient also given instructions to go to the nearest ER or call 911 if unable to remain safe or if the Pt develops thoughts of harming self or others.    [facility]: Reviewed today to detect potential controlled substance misuse, diversion, excessive prescribing, or multiple providers prescribing controlled substances. The patients report was deemed appropriate without new medications of concern prescribed by other providers.    Documentation entered by me for this encounter may have been done in part using "[employer] Direct voice recognition transcription software. Garbled syntax, mangled pronouns, and other bizarre constructions may be attributed to that software system. "Sound like" errors may exist and should be interpreted in context.    "

## 2025-01-10 ENCOUNTER — OFFICE VISIT (OUTPATIENT)
Dept: OBSTETRICS AND GYNECOLOGY | Facility: CLINIC | Age: 40
End: 2025-01-10
Payer: COMMERCIAL

## 2025-01-10 ENCOUNTER — LAB VISIT (OUTPATIENT)
Dept: LAB | Facility: HOSPITAL | Age: 40
End: 2025-01-10
Attending: OBSTETRICS & GYNECOLOGY
Payer: COMMERCIAL

## 2025-01-10 VITALS
HEIGHT: 64 IN | DIASTOLIC BLOOD PRESSURE: 64 MMHG | WEIGHT: 188.69 LBS | SYSTOLIC BLOOD PRESSURE: 100 MMHG | BODY MASS INDEX: 32.21 KG/M2

## 2025-01-10 DIAGNOSIS — N83.202 CYSTS OF BOTH OVARIES: ICD-10-CM

## 2025-01-10 DIAGNOSIS — N83.201 CYSTS OF BOTH OVARIES: ICD-10-CM

## 2025-01-10 DIAGNOSIS — Z01.419 ENCOUNTER FOR ANNUAL ROUTINE GYNECOLOGICAL EXAMINATION: Primary | ICD-10-CM

## 2025-01-10 DIAGNOSIS — Z97.5 IUD CONTRACEPTION: ICD-10-CM

## 2025-01-10 DIAGNOSIS — Z12.4 SCREENING FOR CERVICAL CANCER: ICD-10-CM

## 2025-01-10 LAB
CANCER AG125 SERPL-ACNC: 10 U/ML (ref 0–30)
CEA SERPL-MCNC: 1.7 NG/ML (ref 0–5)

## 2025-01-10 PROCEDURE — 82378 CARCINOEMBRYONIC ANTIGEN: CPT | Performed by: OBSTETRICS & GYNECOLOGY

## 2025-01-10 PROCEDURE — 99999 PR PBB SHADOW E&M-EST. PATIENT-LVL IV: CPT | Mod: PBBFAC,,, | Performed by: OBSTETRICS & GYNECOLOGY

## 2025-01-10 PROCEDURE — 86304 IMMUNOASSAY TUMOR CA 125: CPT | Performed by: OBSTETRICS & GYNECOLOGY

## 2025-01-10 PROCEDURE — 87491 CHLMYD TRACH DNA AMP PROBE: CPT | Performed by: OBSTETRICS & GYNECOLOGY

## 2025-01-10 PROCEDURE — 36415 COLL VENOUS BLD VENIPUNCTURE: CPT | Mod: PN | Performed by: OBSTETRICS & GYNECOLOGY

## 2025-01-10 NOTE — PROGRESS NOTES
Chief Complaint   Patient presents with    Well Woman     Also seen at [facility] ED yesterday and discovered 2 ovarian cysts        History and Physical:  No LMP recorded. Patient has had an implant.       Alba Trivedi is a 39 y.o.  WF who presents today for her routine annual GYN exam. The patient has no Gynecology complaints today. Pelvic pain, no fever      Allergies:   Review of patient's allergies indicates:   Allergen Reactions    Paxil [paroxetine hcl] Nausea Only       Past Medical History:   Diagnosis Date    Allergy     Anxiety     Depression     History of cervical dysplasia     s/p conization followers with Dr Suazo    S/P LASIK surgery of both eyes        Past Surgical History:   Procedure Laterality Date    lasix Bilateral 2022    LYMPH NODE DISSECTION      one behind left ear as teenager       MEDS:   Current Outpatient Medications on File Prior to Visit   Medication Sig Dispense Refill    atorvastatin (LIPITOR) 10 MG tablet Take 1 tablet (10 mg total) by mouth once daily. 90 tablet 3    cetirizine (ZYRTEC) 10 MG tablet Take 10 mg by mouth once daily.      INTRAUTERINE DEVICE, IUD, IU by Intrauterine route.      levonorgestreL (MIRENA) 20 mcg/24 hours (7 yrs) 52 mg IUD Mirena 20 mcg/24 hours (7 yrs) 52 mg intrauterine device   Take by intrauterine route.      lisdexamfetamine (VYVANSE) 40 MG Cap Take 1 capsule (40 mg total) by mouth every morning. 30 capsule 0    ondansetron (ZOFRAN-ODT) 4 MG TbDL Take 1 tablet (4 mg total) by mouth every 6 (six) hours as needed. 10 tablet 0    traZODone (DESYREL) 50 MG tablet Take 1 tablet (50 mg total) by mouth every evening. 90 tablet 0    venlafaxine (EFFEXOR-XR) 150 MG Cp24 Take 1 capsule (150 mg total) by mouth once daily. 90 capsule 1    HYDROcodone-acetaminophen (NORCO) 5-325 mg per tablet Take 1 tablet by mouth every 6 (six) hours as needed for Pain. (Patient not taking: Reported on 1/10/2025) 10 tablet 0    [DISCONTINUED] EPINEPHrine  (EPIPEN) 0.3 mg/0.3 mL AtIn Inject 0.3 mLs (0.3 mg total) into the muscle once. for 1 dose 0.3 mL 0    [DISCONTINUED] lisdexamfetamine (VYVANSE) 40 MG Cap Take 1 capsule (40 mg total) by mouth every morning. (Patient not taking: Reported on 1/10/2025) 30 capsule 0    [DISCONTINUED] lisdexamfetamine (VYVANSE) 40 MG Cap Take 1 capsule (40 mg total) by mouth every morning. (Patient not taking: Reported on 1/10/2025) 30 capsule 0     No current facility-administered medications on file prior to visit.       OB History          2    Para   2    Term   2            AB        Living   2         SAB        IAB        Ectopic        Multiple        Live Births                     Social History     Socioeconomic History    Marital status:    Tobacco Use    Smoking status: Never    Smokeless tobacco: Never   Substance and Sexual Activity    Alcohol use: Yes     Comment: once in a while    Drug use: No    Sexual activity: Yes     Partners: Male     Birth control/protection: I.U.D.   Social History Narrative    Lives with  and 2 children.  Works in [email or web address].       Social Drivers of Health     Financial Resource Strain: Low Risk  (2024)    Overall Financial Resource Strain (CARDIA)     Difficulty of Paying Living Expenses: Not hard at all   Food Insecurity: No Food Insecurity (2024)    Hunger Vital Sign     Worried About Running Out of Food in the Last Year: Never true     Ran Out of Food in the Last Year: Never true   Transportation Needs: No Transportation Needs (10/5/2023)    PRAPARE - Transportation     Lack of Transportation (Medical): No     Lack of Transportation (Non-Medical): No   Physical Activity: Insufficiently Active (2024)    Exercise Vital Sign     Days of Exercise per Week: 3 days     Minutes of Exercise per Session: 20 min   Stress: Stress Concern Present (2024)    [place] citizen [place] [employer] - Occupational Stress Questionnaire      "Feeling of Stress : To some extent   Housing Stability: Low Risk  (10/5/2023)    Housing Stability Vital Sign     Unable to Pay for Housing in the Last Year: No     Number of Places Lived in the Last Year: 1     Unstable Housing in the Last Year: No       Family History   Problem Relation Name Age of Onset    Breast cancer Maternal Grandmother  60    Arthritis Maternal Grandmother      Cancer Maternal Grandmother          breast    Diabetes Maternal Grandmother      Early death Maternal Grandmother      Heart disease Maternal Grandmother      Retinal detachment Maternal Grandmother      Cancer Maternal Grandfather          colon cancer    Diabetes Maternal Grandfather      Early death Maternal Grandfather      Heart disease Maternal Grandfather      Alcohol abuse Father      Cancer Father          BCC    Drug abuse Father      Heart disease Father  55        MI with 3 stents    Asthma Brother      ADD / ADHD Brother      ADD / ADHD Son      Macular degeneration Maternal Uncle      Ovarian cancer Neg Hx      Uterine cancer Neg Hx           Past medical and surgical history reviewed.   I have reviewed the patient's medical history in detail and updated the computerized patient record.        Review of System:   General: no chills, fever, night sweats, weight gain or weight loss  Psychological: no depression or suicidal ideation  Breasts: no new or changing breast lumps, nipple discharge or masses.  Respiratory: no cough, shortness of breath, or wheezing  Cardiovascular: no chest pain or dyspnea on exertion  Gastrointestinal: no abdominal pain, change in bowel habits, or black or bloody stools  Genito-Urinary: no incontinence, urinary frequency/urgency or vulvar/vaginal symptoms, pelvic pain or abnormal vaginal bleeding.  Musculoskeletal: no gait disturbance or muscular weakness      Physical Exam:   /64   Ht 5' 4" (1.626 m)   Wt 85.6 kg (188 lb 11.4 oz)   BMI 32.39 kg/m²   Constitutional: She appears alert " and responsive. She appears well-developed, well-groomed, and well-nourished. No distress.   HENT:   Head: Normocephalic and atraumatic.   Eyes: Conjunctivae and EOM are normal. No scleral icterus.   Neck: Symmetrical. Normal range of motion. Neck supple. No tracheal deviation present. THYROID:  without masses or tenderness.  Cardiovascular: Normal rate, no rhythm abnormality noted. Extremities without swelling or edema, warm.    Pulmonary/Chest: Normal respiratory Effort. No distress or retractions. She exhibits no tenderness.  Breasts: are symmetrical.no masses    Right breast exhibits no inverted nipple, no mass, no nipple discharge, no skin change and no tenderness.   Left breast exhibits no inverted nipple, no mass, no nipple discharge, no skin change and no tenderness.  Abdominal: Soft. She exhibits no distension, hernias or masses. There is no tenderness. No enlargement of liver edge or spleen.  There is no rebound and no guarding.   Genitourinary:    External rectal exam shows no thrombosed external hemorrhoids, no lesions.     Pelvic exam was performed with patient supine.   No labial fusion, and symmetrical.    There is no rash, lesion or injury on the right labia.   There is no rash, lesion or injury on the left labia.   No bleeding and no signs of injury around the vaginal introitus, urethral meatus is normal size and without prolapse or lesions, urethra well supported. The cervix is visualized with no discharge, lesions or friability.   No vaginal discharge found.    No significant Cystocele, Enterocele or rectocele, and cervix and uterus well supported.   Bimanual exam:   The urethra is normal to palpation and there are no palpable vaginal wall masses.   Uterus is not deviated, not enlarged, not fixed, normal shape and not tender.   Cervix exhibits no motion tenderness.    Right adnexum displays no mass or nodularity and no tenderness.   Left adnexum displays no mass or nodularity and no  tenderness.  Musculoskeletal: Normal range of motion.   Lymphadenopathy: No inguinal adenopathy present.   Neurological: She is alert and oriented to person, place, and time. Coordination normal.   Skin: Skin is warm and dry. She is not diaphoretic. No rashes, lesions or ulcers.   Psychiatric: She has a normal mood and affect, oriented to person, place, and time.      Assessment:   Normal annual GYN exam  1. Encounter for annual routine gynecological examination  Vaginosis Screen by DNA Probe      2. IUD contraception  US Pelvis Comp with Transvag NON-OB (xpd    CANCER ANTIGEN 125    CEA      3. Cysts of both ovaries  US Pelvis Comp with Transvag NON-OB (xpd    CANCER ANTIGEN 125    CEA      4. Screening for cervical cancer  Liquid-Based Pap Smear, Screening            Plan:   PAP  Mammogram  CA-125 and CEA  Sono 4-6 weeks  Consider remove IUD  Follow up in 6 weeks sono and IUd.  Patient informed will be contacted with results within 2 weeks. Encouraged to please call back or email if she has not heard from us by then.

## 2025-01-11 LAB
C TRACH DNA SPEC QL NAA+PROBE: NOT DETECTED
N GONORRHOEA DNA SPEC QL NAA+PROBE: NOT DETECTED

## 2025-01-14 ENCOUNTER — OFFICE VISIT (OUTPATIENT)
Dept: OBSTETRICS AND GYNECOLOGY | Facility: CLINIC | Age: 40
End: 2025-01-14
Payer: COMMERCIAL

## 2025-01-14 VITALS — SYSTOLIC BLOOD PRESSURE: 116 MMHG | DIASTOLIC BLOOD PRESSURE: 82 MMHG

## 2025-01-14 DIAGNOSIS — Z30.432 ENCOUNTER FOR IUD REMOVAL: ICD-10-CM

## 2025-01-14 DIAGNOSIS — N83.201 CYSTS OF BOTH OVARIES: ICD-10-CM

## 2025-01-14 DIAGNOSIS — N83.202 CYSTS OF BOTH OVARIES: ICD-10-CM

## 2025-01-14 DIAGNOSIS — Z97.5 IUD CONTRACEPTION: Primary | ICD-10-CM

## 2025-01-14 PROCEDURE — 99499 UNLISTED E&M SERVICE: CPT | Mod: S$GLB,,, | Performed by: OBSTETRICS & GYNECOLOGY

## 2025-01-14 PROCEDURE — 99999 PR PBB SHADOW E&M-EST. PATIENT-LVL III: CPT | Mod: PBBFAC,,, | Performed by: OBSTETRICS & GYNECOLOGY

## 2025-01-14 PROCEDURE — 58301 REMOVE INTRAUTERINE DEVICE: CPT | Mod: S$GLB,,, | Performed by: OBSTETRICS & GYNECOLOGY

## 2025-01-14 RX ORDER — NORETHINDRONE 0.35 MG/1
1 TABLET ORAL DAILY
Qty: 30 TABLET | Refills: 11 | Status: SHIPPED | OUTPATIENT
Start: 2025-01-14 | End: 2026-01-14

## 2025-01-14 NOTE — PROCEDURES
Removal of IUD    Date/Time: 1/14/2025 4:00 PM    Performed by: Aaron Larkin MD  Authorized by: Aaron Larkin MD    Consent obtained:  Prior to procedure the appropriate consent was completed and verified  Consent given by:  Patient  Procedure risks and benefits discussed: yes    Patient questions answered: yes    Patient agrees, verbalizes understanding, and wants to proceed: yes    Educational handouts given: no    Instructions and paperwork completed: yes    Implant grasped by: forceps  Removal due to infection and inflammatory reaction: yes    Other reason for removal:  Pelvic pain  Removal due to mechanical complications: no    Removed with no complications: yes  no

## 2025-01-14 NOTE — PROGRESS NOTES
Chief Complaint   Patient presents with    IUD REMOVAL       History of Present Illness   39 y.o. WF   patient presents today for IUD removal    Past medical and surgical history reviewed.   I have reviewed the patient's medical history in detail and updated the computerized patient record.    Review of patient's allergies indicates:   Allergen Reactions    Paxil [paroxetine hcl] Nausea Only             Physical Examination:  /82 (BP Location: Right arm, Patient Position: Sitting)          Assessment:    1. IUD contraception        2. Cysts of both ovaries  Removal of IUD      3. Encounter for IUD removal  Removal of IUD          Plan:  IUD removal  Micronor Rx until  has vasectomy  Patient informed will be contacted with results within 2 weeks. Encouraged to please call back or email if she has not heard from us by then.
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
## 2025-02-07 ENCOUNTER — HOSPITAL ENCOUNTER (OUTPATIENT)
Dept: RADIOLOGY | Facility: HOSPITAL | Age: 40
Discharge: HOME | End: 2025-02-07
Attending: OBSTETRICS & GYNECOLOGY
Payer: COMMERCIAL

## 2025-02-07 ENCOUNTER — TELEPHONE (OUTPATIENT)
Dept: OBSTETRICS AND GYNECOLOGY | Facility: CLINIC | Age: 40
End: 2025-02-07
Payer: COMMERCIAL

## 2025-02-07 DIAGNOSIS — N83.201 CYSTS OF BOTH OVARIES: ICD-10-CM

## 2025-02-07 DIAGNOSIS — Z97.5 IUD CONTRACEPTION: ICD-10-CM

## 2025-02-07 DIAGNOSIS — N83.202 CYSTS OF BOTH OVARIES: ICD-10-CM

## 2025-02-07 PROCEDURE — 76830 TRANSVAGINAL US NON-OB: CPT | Mod: 26,,, | Performed by: RADIOLOGY

## 2025-02-07 PROCEDURE — 76856 US EXAM PELVIC COMPLETE: CPT | Mod: 26,,, | Performed by: RADIOLOGY

## 2025-02-07 PROCEDURE — 76830 TRANSVAGINAL US NON-OB: CPT | Mod: TC,PN

## 2025-02-07 NOTE — TELEPHONE ENCOUNTER
----- Message from Ernestina sent at 2/7/2025  7:59 AM CST -----  Regarding: running late  Contact: pt [phone]  Type: Needs Medical Advice  Who Called:  Pt     Best Call Back Number: [phone]    Additional Information: Pt running late ETA 8:10am

## 2025-02-11 ENCOUNTER — OFFICE VISIT (OUTPATIENT)
Dept: OBSTETRICS AND GYNECOLOGY | Facility: CLINIC | Age: 40
End: 2025-02-11
Payer: COMMERCIAL

## 2025-02-11 DIAGNOSIS — N83.202 CYSTS OF BOTH OVARIES: Primary | ICD-10-CM

## 2025-02-11 DIAGNOSIS — N83.201 CYSTS OF BOTH OVARIES: Primary | ICD-10-CM

## 2025-02-11 PROCEDURE — 1160F RVW MEDS BY RX/DR IN RCRD: CPT | Mod: CPTII,95,, | Performed by: OBSTETRICS & GYNECOLOGY

## 2025-02-11 PROCEDURE — 1159F MED LIST DOCD IN RCRD: CPT | Mod: CPTII,95,, | Performed by: OBSTETRICS & GYNECOLOGY

## 2025-02-11 PROCEDURE — 98005 SYNCH AUDIO-VIDEO EST LOW 20: CPT | Mod: 95,,, | Performed by: OBSTETRICS & GYNECOLOGY

## 2025-02-11 NOTE — PROGRESS NOTES
The chief complaint   The patient location is: [place]    Visit type: audiovisual    Face to Face time with patient:   20 minutes of total time spent on the encounter, which includes face to face time and non-face to face time preparing to see the patient (eg, review of tests), Obtaining and/or reviewing separately obtained history, Documenting clinical information in the electronic or other health record, Independently interpreting results (not separately reported) and communicating results to the patient/family/caregiver, or Care coordination (not separately reported).         Each patient to whom he or she provides medical services by telemedicine is:  (1) informed of the relationship between the physician and patient and the respective role of any other health care provider with respect to management of the patient; and (2) notified that he or she may decline to receive medical services by telemedicine and may withdraw from such care at any time.    Notes:      History of Present Illness   39 y.o. WF   patient presents today for follow up pelvic pain, bilat ovarian cyst.  Last visit IUD was removed and pt started on Micronor.  Pt had a menses, pain has resolved.  Follow up ultrasound was done    Past medical and surgical history reviewed.   I have reviewed the patient's medical history in detail and updated the computerized patient record.    Review of patient's allergies indicates:   Allergen Reactions    Paxil [paroxetine hcl] Nausea Only         Review of Systems -   GEN ROS: negative  Breast ROS: negative for breast lumps  Genito-Urinary ROS: negative      Physical Examination:    Sono: ovarian cyst resolved      Assessment:  Resolution of cyst  1. Cysts of both ovaries        Pain has resolved since IUD removed    Plan:  Micronor   may get vasectomy  F/u prn or annual pap  Patient informed will be contacted with results within 2 weeks. Encouraged to please call back or email if she has not  heard from us by then.      T

## 2025-02-12 ENCOUNTER — OFFICE VISIT (OUTPATIENT)
Dept: PSYCHIATRY | Facility: CLINIC | Age: 40
End: 2025-02-12
Payer: COMMERCIAL

## 2025-02-12 DIAGNOSIS — F90.2 ATTENTION DEFICIT HYPERACTIVITY DISORDER (ADHD), COMBINED TYPE: ICD-10-CM

## 2025-02-12 DIAGNOSIS — F33.40 RECURRENT MAJOR DEPRESSIVE DISORDER, IN REMISSION: ICD-10-CM

## 2025-02-12 DIAGNOSIS — F41.1 GAD (GENERALIZED ANXIETY DISORDER): Primary | ICD-10-CM

## 2025-02-12 PROCEDURE — 90791 PSYCH DIAGNOSTIC EVALUATION: CPT | Mod: 95,,,

## 2025-02-12 RX ORDER — LISDEXAMFETAMINE DIMESYLATE 40 MG/1
40 CAPSULE ORAL EVERY MORNING
Qty: 30 CAPSULE | Refills: 0 | Status: SHIPPED | OUTPATIENT
Start: 2025-02-12 | End: 2025-03-14

## 2025-02-12 NOTE — PROGRESS NOTES
"Individual Psychotherapy (PhD/LCSW)    02/26/2025    The patient location is:  home in [place], [place]  The patient phone number is: [phone]   Visit type: Virtual visit with synchronous audio and video  Each patient to whom he or she provides medical services by telemedicine is:  (1) informed of the relationship between the provider and patient and the respective role of any other health care provider with respect to management of the patient; and (2) notified that he or she may decline to receive medical services by telemedicine and may withdraw from such care at any time.  Crisis Disclaimer: Patient was informed that due to the virtual nature of the visit, that if a crisis develops, protocols will be implemented to ensure patient safety, including but not limited to: 1) Initiating a welfare check with local Law Enforcement, 2) Calling [employer]/National Crisis Hotline, and/or 3) Initiating PEC/CEC procedures.    Interim Events/Subjective Report/Content of Current Session:  follow-up appointment.    Pt is a 39 y.o. female with past psychiatric hx of  anxiety, depression, ADHD who presents for follow-up treatment.    Pt discussed hectic time at work.   Pt discussed trying to establish work boundaries. Pt discussed working with her manager to find a balance.   Pt expressed awareness she pushes herself "way too hard."     Pt stated she has not gone to work-out yet this week due to work; pt stated she plans to go tonight with .     Pt discussed having an emotional week due to 17y/o son Vel who left for [place] on 2/16/25.  Pt stated he is not able to call home for the first 2 weeks.   Pt stated she has been writing him letters.   Pt discussed grieving the life she thought her son Vel would have.     Pt stated she changed birth control and "feeling super hormonal."     Pt discussed having a "spat" with  but had a long talk last night.     Pt discussed how 15y/o son Justice picked his classes for 9th " "grade; pt stated he does not want to go to college.   Pt discussed how Justice is a "go getter;" had a grass cutting business over the summer and made thousands of dollars.     Pt encouraged to engage in self-care. Discussed small attainable goals.     Current symptoms:  Depression: fatigue and difficulty concentrating, crying spells  Anxiety: excessive worrying and restlessness.  Sleep: frequent night time awakening and non-restful sleep.  Angeles:  denies.  Psychosis: denies .    Therapeutic Intervention/Techniques: behavior modification, insight oriented, interactive, and supportive; relevant to diagnosis, patient responds to this modality    Will continue to follow.   Pt aware to contact sw for any additional needs that may occur prior to next session.    Risk Parameters:  Patient reports no suicidal ideation  Patient reports no homicidal ideation  Patient reports no self-injurious behavior  Patient reports no violent behavior    Diagnosis:   1. SAHARA (generalized anxiety disorder)        2. Attention deficit hyperactivity disorder (ADHD), combined type        3. Recurrent major depressive disorder, in remission            Return to Clinic: as scheduled  Counseling time: 45  -Call to report any worsening of symptoms or problems associated with medication.  - Pt instructed to go to ER if thoughts of harming self or others arise.   -Supportive therapy and psychoeducation provided  -Pt instructed to call clinic, 911 or go to nearest emergency room if sxs worsen or pt is in crisis. The pt expresses understanding.   Each patient to whom he or she provides medical services by telemedicine is:  (1) informed of the relationship between the physician and patient and the respective role of any other health care provider with respect to management of the patient; and (2) notified that he or she may decline to receive medical services by telemedicine and may withdraw from such care at any time.      "

## 2025-02-12 NOTE — TELEPHONE ENCOUNTER
Last ordered: 1 month ago (1/6/2025) by Susannah Jordan NP     Patient comment: Please refill at [facility] - [place], [place] - [address] Thank you.       Nov 4/7/25

## 2025-02-26 ENCOUNTER — OFFICE VISIT (OUTPATIENT)
Dept: PSYCHIATRY | Facility: CLINIC | Age: 40
End: 2025-02-26
Payer: COMMERCIAL

## 2025-02-26 DIAGNOSIS — F90.2 ATTENTION DEFICIT HYPERACTIVITY DISORDER (ADHD), COMBINED TYPE: ICD-10-CM

## 2025-02-26 DIAGNOSIS — F41.1 GAD (GENERALIZED ANXIETY DISORDER): Primary | ICD-10-CM

## 2025-02-26 DIAGNOSIS — F33.40 RECURRENT MAJOR DEPRESSIVE DISORDER, IN REMISSION: ICD-10-CM

## 2025-02-26 PROCEDURE — 90834 PSYTX W PT 45 MINUTES: CPT | Mod: 95,,,

## 2025-02-27 DIAGNOSIS — E78.5 HYPERLIPIDEMIA, UNSPECIFIED HYPERLIPIDEMIA TYPE: ICD-10-CM

## 2025-02-27 RX ORDER — ATORVASTATIN CALCIUM 10 MG/1
10 TABLET, FILM COATED ORAL
Qty: 90 TABLET | Refills: 3 | Status: SHIPPED | OUTPATIENT
Start: 2025-02-27

## 2025-02-27 RX ORDER — TRAZODONE HYDROCHLORIDE 50 MG/1
50 TABLET ORAL NIGHTLY
Qty: 90 TABLET | Refills: 0 | Status: SHIPPED | OUTPATIENT
Start: 2025-02-27

## 2025-02-27 NOTE — TELEPHONE ENCOUNTER
Care Due:                  Date            Visit Type   Department     Provider  --------------------------------------------------------------------------------                                EP -                              PRIMARY      [facility]  Last Visit: 10-      CARE ([facility])   MEDICINE       Mei Monteiro  Next Visit: None Scheduled  None         None Found                                                            Last  Test          Frequency    Reason                     Performed    Due Date  --------------------------------------------------------------------------------    Office Visit  15 months..  atorvastatin.............  10-   12-    Lipid Panel.  12 months..  atorvastatin.............  10-   10-    [employer] Embedded Care Due Messages. Reference number: [id number].   2/27/2025 6:53:52 AM CST

## 2025-02-27 NOTE — PROGRESS NOTES
"Individual Psychotherapy (PhD/LCSW)    03/12/2025    The patient location is:  home in [place], [place]  The patient phone number is: [phone]   Visit type: Virtual visit with synchronous audio and video  Each patient to whom he or she provides medical services by telemedicine is:  (1) informed of the relationship between the provider and patient and the respective role of any other health care provider with respect to management of the patient; and (2) notified that he or she may decline to receive medical services by telemedicine and may withdraw from such care at any time.  Crisis Disclaimer: Patient was informed that due to the virtual nature of the visit, that if a crisis develops, protocols will be implemented to ensure patient safety, including but not limited to: 1) Initiating a welfare check with local Law Enforcement, 2) Calling [employer]/National Crisis Hotline, and/or 3) Initiating PEC/CEC procedures.     Interim Events/Subjective Report/Content of Current Session:  follow-up appointment.     Pt is a 39 y.o. female with past psychiatric hx of  anxiety, depression, ADHD who presents for follow-up treatment.     Pt stated she feels she is handling son Vel is doing well at [facility]; talked on phone this past Saturday.     Pt stated she has been going to the gym daily after work.     Pt expressed concerns about her new birth control and psych meds. Pt encouraged to discussed with Susananh Jordan, NP - psych med management provider.     Pt discussed how son Justice is doing track and going on an 8th grade trip to [employer] [facility].     Pt processed work stressors; very busy.     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     Pt processed difficulties cleaning/organizing. Discussed tips.     Current symptoms:  Depression: fatigue and difficulty concentrating, low energy, low motivation   Anxiety: excessive worrying, restlessness, and muscle tension.  Sleep:  denies issue but "I don't feel rested during the " "day."  Angeles:  denies.  Psychosis: denies .    Therapeutic Intervention/Techniques: behavior modification, insight oriented, interactive, and supportive; relevant to diagnosis, patient responds to this modality    Will continue to follow.   Pt aware to contact sw for any additional needs that may occur prior to next session.    Risk Parameters:  Patient reports no suicidal ideation  Patient reports no homicidal ideation  Patient reports no self-injurious behavior  Patient reports no violent behavior    Diagnosis:   1. SAHARA (generalized anxiety disorder)        2. Attention deficit hyperactivity disorder (ADHD), combined type        3. Recurrent major depressive disorder, in remission            Return to Clinic: as scheduled  Counseling time: 45  -Call to report any worsening of symptoms or problems associated with medication.  - Pt instructed to go to ER if thoughts of harming self or others arise.   -Supportive therapy and psychoeducation provided  -Pt instructed to call clinic, 911 or go to nearest emergency room if sxs worsen or pt is in crisis. The pt expresses understanding.   Each patient to whom he or she provides medical services by telemedicine is:  (1) informed of the relationship between the physician and patient and the respective role of any other health care provider with respect to management of the patient; and (2) notified that he or she may decline to receive medical services by telemedicine and may withdraw from such care at any time.      "

## 2025-02-27 NOTE — TELEPHONE ENCOUNTER
Refill Routing Note   Medication(s) are not appropriate for processing by [facility] for the following reason(s):        Required labs outdated  Patient not seen by provider within 15 months    [facility] action(s):  Defer   Requires appointment : Yes               Appointments  past 12m or future 3m with PCP    Date Provider   Last Visit   10/6/2023 Mei Monteiro, DO   Next Visit   Visit date not found Mei Monteiro,    ED visits in past 90 days: 1        Note composed:11:02 AM 02/27/2025

## 2025-03-12 ENCOUNTER — OFFICE VISIT (OUTPATIENT)
Dept: PSYCHIATRY | Facility: CLINIC | Age: 40
End: 2025-03-12
Payer: COMMERCIAL

## 2025-03-12 DIAGNOSIS — F90.2 ATTENTION DEFICIT HYPERACTIVITY DISORDER (ADHD), COMBINED TYPE: ICD-10-CM

## 2025-03-12 DIAGNOSIS — F33.40 RECURRENT MAJOR DEPRESSIVE DISORDER, IN REMISSION: ICD-10-CM

## 2025-03-12 DIAGNOSIS — F41.1 GAD (GENERALIZED ANXIETY DISORDER): Primary | ICD-10-CM

## 2025-03-12 PROCEDURE — 90834 PSYTX W PT 45 MINUTES: CPT | Mod: 95,,,

## 2025-04-07 ENCOUNTER — OFFICE VISIT (OUTPATIENT)
Dept: PSYCHIATRY | Facility: CLINIC | Age: 40
End: 2025-04-07
Payer: COMMERCIAL

## 2025-04-07 DIAGNOSIS — F41.1 GAD (GENERALIZED ANXIETY DISORDER): Primary | ICD-10-CM

## 2025-04-07 DIAGNOSIS — F90.2 ATTENTION DEFICIT HYPERACTIVITY DISORDER (ADHD), COMBINED TYPE: ICD-10-CM

## 2025-04-07 DIAGNOSIS — F40.10 SOCIAL ANXIETY DISORDER: ICD-10-CM

## 2025-04-07 DIAGNOSIS — F33.40 RECURRENT MAJOR DEPRESSIVE DISORDER, IN REMISSION: ICD-10-CM

## 2025-04-07 DIAGNOSIS — F51.01 PRIMARY INSOMNIA: ICD-10-CM

## 2025-04-07 RX ORDER — ATOMOXETINE 40 MG/1
CAPSULE ORAL
Qty: 53 CAPSULE | Refills: 0 | Status: SHIPPED | OUTPATIENT
Start: 2025-04-07 | End: 2025-05-07

## 2025-04-07 NOTE — PROGRESS NOTES
"OUTPATIENT PSYCHIATRY FOLLOW UP VISIT    Encounter Date: 4/7/2025    Clinical Status of Patient:  Outpatient (Virtual)  The patient location is: [address] [place] [address]  The patient phone number is: [phone]   Visit type: Virtual visit with synchronous audio and video  Each patient to whom he or she provides medical services by telemedicine is:  (1) informed of the relationship between the practitioner and patient and the respective role of any other health care provider with respect to management of the patient; and (2) notified that he or she may decline to receive medical services by telemedicine and may withdraw from such care at any time.    Chief Complaint:  Alba Trivedi is a 39 y.o. female who presents today for follow-up.  Met with patient.      HISTORY OF PRESENTING ILLNESS:  Alba Trivedi is a 39 y.o. female with history of MDD, SAHARA, SAD, ADHD, insomnia who presents for follow up appointment.      Plan at last appointment:  Continue venlafaxine  mg daily for mood and anxiety  Continue lisdexamfetamine 40 mg q.a.m. for breakthrough symptoms of ADHD  Continue trazodone 12.5 mg q.h.s. p.r.n. insomnia (patient is breaking a 50 mg tablet into 4 pieces.  25 mg causes a.m. drowsiness)   Referral placed for individual psychotherapy    Psychotropic medication history:   Zoloft 50 mg daily, Paxil (nausea), viibryd 40 mg daily  bupropion  mg q.a.m. (ineffective for concentration)  xanax 0.25 mg p.r.n. anxiety (effective, hasn't needed)  Trazodone (effective)  Not escitalopram, prozac, effexor, cymbalta, fluoxetine, bupropion       INTERVAL HISTORY:    Pt reports she continues to experience "physical tiredness" which is her main complaint. She states she has been exercising regularly and making healthy food choices.     Has changed birth control, Mirena was recently removed. Is now taking oral contraceptives. Wonders if this could be affecting fatigue.    Mood has been " "stable and generalized anxiety has been well controlled. "Overall I'm OK, it's just this tiredness."     She states Vyvanse is causing hyperfocus and notes she will spend too much time on one task at work and is unable to attend to other priorities or shift focus.    She also reports feeling mildly anxious and impatient when taking Vyvanse. However, Vyvanse does improve energy.    She recently learned her half-sister has Crohn's and is concerned fatigue may be caused by an autoimmune disease. Encouraged Pt to see her PCP.    Has started individual psychotherapy with Juan Lima LCSW.    Is patient experiencing or having changes in:  Trouble with sleep:  sleeping well with trazodone, is sleeping through the night, no longer having mid-night awakening  Appetite changes:  no  Weight changes:  no  Lack of energy: improving  Anhedonia: no  Somatic symptoms:  no  Anxiety/panic:  Well controlled  Irritability: no  Guilty/hopeless:  no  Concentration: no  Racing thoughts: no  Impulsive behaviors: no  Paranoia/AVH: no  Self-injurious behavior/risky behavior:  no  Any drugs:  no  Alcohol:  no    No interval episodes with symptoms consistent with jatinder or hypomania.  Denied interval or current suicidal/homicidal thoughts, intent, or plan or NSSI.  Denied other questions and concerns.  Patient reports feeling stable.    Medication side effects: None Pt denies cardiovascular events including increased heart rate or increased blood pressure, palpitations, chest pain, dizziness, lightheadedness, or syncopal episodes. Pt denies A/V hallucinations or behavioral effects. Pt denies anorexia, decreased appetite, xerostomia, headache, insomnia, or digital changes.  Medication adherence: yes    MEDICAL REVIEW OF SYSTEMS:   Pain: Denies any significant chronic or acute pain.  Constitutional: Denies fever or change in appetite.  Cardiovascular: Denies chest pain or exertional dyspnea.  Respiratory: Denies cough or orthopnea.   GI: " Denies abdominal pain, N/V  Neurological: Denies tremor, seizure, or focal weakness.  Psychiatric: See HPI above.    PAST PSYCHIATRIC, MEDICAL, AND SOCIAL HISTORY REVIEWED  The patient's past medical, family and social history have been reviewed and updated as appropriate within the electronic medical record - see encounter notes.    PAST MEDICAL HISTORY:   Past Medical History:   Diagnosis Date    Allergy     Anxiety     Depression     History of cervical dysplasia     s/p conization followers with Dr Suazo    S/P LASIK surgery of both eyes     Head trauma/Loss of consciousness: denies  Seizures: denies     PAST PSYCHIATRIC HISTORY:  Psychiatric Care (current & past):  Dr Self for treatment of ADHD, patient's OBGYN prescribed antidepressants in the past  Previous Psychiatric Diagnoses:  ADHD, SAHARA, MDD  Previous Psychiatric Hospitalizations: denies  Previous SI/HI:  denies  Previous Suicide Attempts or NSSI: denies  History of Psychotherapy: denies  History of Violence: denies    FAMILY HISTORY:   Paternal: abusive alcoholic father  Maternal: no psychiatric history or history of substance abuse or suicide  Children:  ADHD     SOCIAL HISTORY:   Marital Status/Relationship Status:  x 19 years  Children: 2 children, boys, 14 and 13   Resides/Housing Status: [place]   Occupation/Employment: "[employer]  Hobbies/Recreational Activities: crafting, shopping    Spirituality/Oriental orthodox: raised Christianity, not practicing  Education level: HS grad   History: denies  Legal History: denies  Access to firearms: yes, locked in a safe,  is a jaime     SUBSTANCE USE HISTORY:  Caffeine: coffee 1 large cup in AM  Tobacco: denies  Alcohol: occasionally  Other Substances: denies  Rehab: denies  Detoxes:  denies    EXAM:  Constitutional  Vitals:  Most recent vital signs were reviewed.   Last 3 sets of VS:      1/7/2025    11:32 AM 1/10/2025    10:41 AM 1/14/2025     4:06 PM   Vitals - 1 value per visit  "  SYSTOLIC 144 100 116   DIASTOLIC 87 64 82   Pulse 71     Temp 98.9 °F (37.2 °C)     Resp 18     SPO2 99 %     Weight (lb) 185 188.71 --   Weight (kg) 83.915 85.6 --   Height 5' 4" (1.626 m) 5' 4" (1.626 m)    BMI (Calculated) 31.7 32.4    Pain Score  One One      General:  unremarkable, age appropriate     Musculoskeletal  Muscle Strength/Tone:  No tremors appreciated   Gait & Station:  Pt seated during virtual visit     Psychiatric  Speech:  no latency; no press   Mood & Affect:  euthymic  congruent and appropriate   Thought Process:  normal and logical   Associations:  intact   Thought Content:  normal, no suicidality, no homicidality, delusions, or paranoia   Insight:  intact   Judgement: behavior is adequate to circumstances   Orientation:  grossly intact   Memory: intact for content of interview   Language: grossly intact   Attention Span & Concentration:  Intact to interview   Fund of Knowledge:  Not formally tested     SUICIDE RISK ASSESSMENT:  Protective factors: age, gender, no prior attempts, no prior hospitalizations, no family h/o attempts, no ongoing substance abuse, no psychosis, , has children, denies SI/intent/plan, seeking treatment, access to treatment, future oriented, good primary support  Risks:  Ongoing depression anxiety, history of ADHD, access to firearms  Patient is a low immediate and long-term risk considering risk factors.      RELEVANT LABS/STUDIES:    Lab Results   Component Value Date    WBC 6.72 01/07/2025    HGB 15.7 01/07/2025    HCT 48.5 01/07/2025    MCV 90 01/07/2025     01/07/2025     BMP  Lab Results   Component Value Date     01/07/2025    K 3.9 01/07/2025     01/07/2025    CO2 23 01/07/2025    BUN 9 01/07/2025    CREATININE 0.66 01/07/2025    CALCIUM 9.7 01/07/2025    ANIONGAP 8 01/07/2025    ESTGFRAFRICA >60.0 03/05/2021    EGFRNONAA >60.0 03/05/2021     Lab Results   Component Value Date    ALT 16 01/07/2025    AST 22 01/07/2025    ALKPHOS 76 " 01/07/2025    BILITOT 1.0 01/07/2025     Lab Results   Component Value Date    TSH 1.507 10/06/2023     Lab Results   Component Value Date    HGBA1C 5.2 10/06/2023     Lab Results   Component Value Date    CHOL 277 (H) 10/06/2023    TRIG 193 (H) 10/06/2023    HDL 60 10/06/2023    LDLCALC 178.4 (H) 10/06/2023    CHOLHDL 21.7 10/06/2023    TOTALCHOLEST 4.6 10/06/2023       IMPRESSION:    Alba Trivedi is a 39 y.o. female with history of MDD, SAHARA, SAD, ADHD, insomnia, who presents for follow up appointment.    Status/Progress: Based on the examination today, the patient's problem(s) is/are improved and well controlled.  New problems have not been presented today.   Co-morbidities are complicating management of the primary condition.  There are no active rule-out diagnoses for this patient at this time.     Patient reports continued fatigue however, she denies other residual symptoms of depression.  Encouraged patient to follow up with her PCP to rule out common etiologies.  She also reports over focus and inability to shift attention with Vyvanse 40 mg daily.  Discussed treatment options with patient.  Through shared decision making, atomoxetine will be started and titrated to 80 mg daily for ADHD.      Risk Parameters:  Patient reports no suicidal ideation  Patient reports no homicidal ideation  Patient reports no self-injurious behavior  Patient reports no violent behavior    DIAGNOSES:    ICD-10-CM ICD-9-CM   1. SAHARA (generalized anxiety disorder)  F41.1 300.02   2. Attention deficit hyperactivity disorder (ADHD), combined type  F90.2 314.01   3. Recurrent major depressive disorder, in remission  F33.40 296.35   4. Social anxiety disorder  F40.10 300.23   5. Primary insomnia  F51.01 307.42       PLAN:  Continue venlafaxine  mg daily for mood and anxiety  Discontinue lisdexamfetamine 40 mg q.a.m. for breakthrough symptoms of ADHD  Start atomoxetine 40 mg for 7 days then increase to 80 mg daily for  symptoms of ADHD  Continue trazodone 12.5 mg q.h.s. p.r.n. insomnia (patient is breaking a 50 mg tablet into 4 pieces.  25 mg causes a.m. drowsiness)   Continue individual psychotherapy with Juan Lima LCSW    RETURN TO CLINIC:    4-6 weeks in person      BRICE Yang, PMHNP-BC    30 minutes of total time spent on the encounter, which includes face to face time and non-face to face time preparing to see the patient (eg. review of tests), obtaining and/or reviewing separately obtained history, documenting clinical information in the electronic health record, independently interpreting results (not separately reported), and communicating results to the patient/family/caregiver, or care coordination (not separately reported).     Visit today included managing the longitudinal care of the patient due to the serious and/or complex managed problem(s) MDD, SAHARA, ADHD, social anxiety disorder, insomnia.    At this time there are no indications the patient represents an imminent danger to either themselves or others; will continue to manage treatment in the outpatient setting.    I discussed the patient's care with the patient including benefits, alternatives, possible adverse effects of the treatment plan; including the potential for metabolic complications, major organ dysfunction, black box warnings, and contraindications. The opportunity was given for questions/clarification, and after this discussion the above treatment plan was devised through shared decision making. The patient voiced their understanding of the diagnoses and treatments listed above and agreed to the treatment plan. Follow up plan was reviewed with the patient. The patient was advised to call to report any worsening of symptoms or problems with medication.    Patient has been given crisis information including Suicide and Crisis Lifeline (call or text: 548). Patient also given instructions to go to the nearest ER or call 911 if unable to  "remain safe or if the Pt develops thoughts of harming self or others.    [facility]: Reviewed today to detect potential controlled substance misuse, diversion, excessive prescribing, or multiple providers prescribing controlled substances. The patients report was deemed appropriate without new medications of concern prescribed by other providers.    Documentation entered by me for this encounter may have been done in part using [employer] voice recognition transcription software. Garbled syntax, mangled pronouns, and other bizarre constructions may be attributed to that software system. "Sound like" errors may exist and should be interpreted in context.      "

## 2025-04-22 NOTE — PROGRESS NOTES
Subjective:       Patient ID: Christel Alejo is a 36 y.o. female.    Chief Complaint: Follow-up and Sinus Problem    HPI     The patient is a 36-year-old who was supposed to be here for follow-up but has been experiencing COVID symptoms.      Today we discussed the followin) sinus symptoms.  She has been experiencing sinus symptoms.  She wanted to be tested for COVID just to be certain.  Her sinus symptoms include sinus stuffiness, sore throat, red throat and a new cough.  She has not had any fevers or chills.  She has not had any change in her taste or smell.  She denies any nausea vomiting or diarrhea.  Of note, she had COVID in July and was hospitalized for that infection.  After her July COVID infection, she was sick for quite some time and had been taking Advair consistently.  Over the past 3 weeks, she has been able to stop the Advair     2)  depression and anxiety.  She has been feeling overly emotional and tells me she has been an emotional wreck.  She is having a lot of anxiety issues and has felt depressed.  She home wonders if her current illnesses contributing to her current emotional issues.  She denies any SI or HI.  She has not been taking her vibryd consistently.  She does request refill of her Xanax having just completed her 30 pill count from October    Review of Systems   Constitutional: Negative for appetite change, chills, diaphoresis, fatigue, fever and unexpected weight change.   HENT: Positive for congestion, postnasal drip, rhinorrhea, sore throat and voice change. Negative for ear pain, sinus pressure, sneezing and trouble swallowing.    Eyes: Negative for pain, discharge and visual disturbance.   Respiratory: Positive for cough. Negative for chest tightness, shortness of breath and wheezing.    Cardiovascular: Negative for chest pain, palpitations and leg swelling.   Gastrointestinal: Negative for abdominal distention, abdominal pain, blood in stool, constipation, diarrhea,  nausea and vomiting.   Skin: Negative for rash.   Psychiatric/Behavioral: Positive for dysphoric mood. Negative for self-injury, sleep disturbance and suicidal ideas. The patient is nervous/anxious.        Objective:      Physical Exam  Constitutional:       General: She is not in acute distress.     Appearance: Normal appearance. She is well-developed.   HENT:      Head: Normocephalic and atraumatic.      Right Ear: Hearing, tympanic membrane, ear canal and external ear normal.      Left Ear: Hearing, tympanic membrane, ear canal and external ear normal.      Nose: Nose normal.      Mouth/Throat:      Mouth: No oral lesions.      Pharynx: No oropharyngeal exudate or posterior oropharyngeal erythema.   Eyes:      General: Lids are normal. No scleral icterus.     Extraocular Movements: Extraocular movements intact.      Conjunctiva/sclera: Conjunctivae normal.      Pupils: Pupils are equal, round, and reactive to light.   Neck:      Thyroid: No thyroid mass or thyromegaly.      Vascular: No carotid bruit.   Cardiovascular:      Rate and Rhythm: Normal rate and regular rhythm.  No extrasystoles are present.     Chest Wall: PMI is not displaced.      Heart sounds: Normal heart sounds. No murmur heard.  No gallop.    Pulmonary:      Effort: Pulmonary effort is normal. No accessory muscle usage or respiratory distress.      Breath sounds: Normal breath sounds.   Chest:   Breasts:      Right: No supraclavicular adenopathy.      Left: No supraclavicular adenopathy.       Abdominal:      General: Bowel sounds are normal. There is no abdominal bruit.      Palpations: Abdomen is soft.      Tenderness: There is no abdominal tenderness. There is no rebound.   Musculoskeletal:      Cervical back: Normal range of motion and neck supple.   Lymphadenopathy:      Head:      Right side of head: No submental or submandibular adenopathy.      Left side of head: No submental or submandibular adenopathy.      Cervical:      Right  "cervical: No superficial, deep or posterior cervical adenopathy.     Left cervical: No superficial, deep or posterior cervical adenopathy.      Upper Body:      Right upper body: No supraclavicular adenopathy.      Left upper body: No supraclavicular adenopathy.   Skin:     General: Skin is warm and dry.   Neurological:      Mental Status: She is alert and oriented to person, place, and time.   Psychiatric:         Attention and Perception: Attention and perception normal.         Mood and Affect: Affect normal. Mood is anxious.         Speech: Speech is rapid and pressured.         Behavior: Behavior normal. Behavior is cooperative.         Thought Content: Thought content normal.         Cognition and Memory: Cognition and memory normal.         Judgment: Judgment normal.       Blood pressure 116/78, pulse 93, temperature 98.6 °F (37 °C), height 5' 5" (1.651 m), weight 76.4 kg (168 lb 5.1 oz), SpO2 98 %.Body mass index is 28.01 kg/m².        Rapid COVID test is positive    A/P:  1) COVID.  Acute.  For now, she will continue symptomatic/supportive care.  She will resume the Advair and take this for the next 6 weeks after which she could stop and see how she does without it.  She can also use her albuterol as needed..  If she does not note improvement or if she develops any new worsening symptoms, she will let me know  2) depression and anxiety.  Uncontrolled.  We are going to resume consistent use of her vibrio add.  I did refill her Xanax for limited use.  She understands that the Xanax is a benzodiazepine with the potential for addiction and tolerance.  The patient also understands that all benzos impair reflexes and cognition and so the patient should not drive, operate heavy machinery or make significant decisions while taking the benzodiazepine.  The patient should also not take the benzodiazepines with alcohol  " abnormal

## 2025-05-07 ENCOUNTER — OFFICE VISIT (OUTPATIENT)
Dept: FAMILY MEDICINE | Facility: CLINIC | Age: 40
End: 2025-05-07
Payer: COMMERCIAL

## 2025-05-07 VITALS
RESPIRATION RATE: 18 BRPM | DIASTOLIC BLOOD PRESSURE: 82 MMHG | WEIGHT: 192.13 LBS | SYSTOLIC BLOOD PRESSURE: 126 MMHG | HEART RATE: 87 BPM | HEIGHT: 64 IN | TEMPERATURE: 98 F | OXYGEN SATURATION: 99 % | BODY MASS INDEX: 32.8 KG/M2

## 2025-05-07 DIAGNOSIS — N92.6 LATE MENSES: ICD-10-CM

## 2025-05-07 DIAGNOSIS — Z00.00 LABORATORY EXAM ORDERED AS PART OF ROUTINE GENERAL MEDICAL EXAMINATION: ICD-10-CM

## 2025-05-07 DIAGNOSIS — F90.0 ATTENTION DEFICIT HYPERACTIVITY DISORDER (ADHD), PREDOMINANTLY INATTENTIVE TYPE: ICD-10-CM

## 2025-05-07 DIAGNOSIS — R92.8 ABNORMAL MAMMOGRAM: ICD-10-CM

## 2025-05-07 DIAGNOSIS — E78.2 MIXED HYPERLIPIDEMIA: ICD-10-CM

## 2025-05-07 DIAGNOSIS — R92.8 ABNORMAL MAMMOGRAM OF LEFT BREAST: ICD-10-CM

## 2025-05-07 DIAGNOSIS — F51.01 PRIMARY INSOMNIA: ICD-10-CM

## 2025-05-07 DIAGNOSIS — F33.1 MAJOR DEPRESSIVE DISORDER, RECURRENT EPISODE, MODERATE: ICD-10-CM

## 2025-05-07 DIAGNOSIS — B76.9 HOOKWORM INFECTION: ICD-10-CM

## 2025-05-07 DIAGNOSIS — Z00.00 ANNUAL PHYSICAL EXAM: Primary | ICD-10-CM

## 2025-05-07 DIAGNOSIS — G47.33 OBSTRUCTIVE SLEEP APNEA SYNDROME: ICD-10-CM

## 2025-05-07 DIAGNOSIS — R53.83 FATIGUE, UNSPECIFIED TYPE: ICD-10-CM

## 2025-05-07 DIAGNOSIS — F41.1 GAD (GENERALIZED ANXIETY DISORDER): ICD-10-CM

## 2025-05-07 PROBLEM — J32.9 SINUSITIS: Status: RESOLVED | Noted: 2019-12-25 | Resolved: 2025-05-07

## 2025-05-07 LAB
ALBUMIN SERPL BCP-MCNC: 3.9 G/DL (ref 3.5–5.2)
ALP SERPL-CCNC: 76 UNIT/L (ref 40–150)
ALT SERPL W/O P-5'-P-CCNC: 22 UNIT/L (ref 10–44)
ANION GAP (OHS): 11 MMOL/L (ref 8–16)
AST SERPL-CCNC: 17 UNIT/L (ref 11–45)
B-HCG UR QL: NEGATIVE
BILIRUB SERPL-MCNC: 1.1 MG/DL (ref 0.1–1)
BUN SERPL-MCNC: 10 MG/DL (ref 6–20)
CALCIUM SERPL-MCNC: 8.8 MG/DL (ref 8.7–10.5)
CHLORIDE SERPL-SCNC: 103 MMOL/L (ref 95–110)
CHOLEST SERPL-MCNC: 258 MG/DL (ref 120–199)
CHOLEST/HDLC SERPL: 4.5 {RATIO} (ref 2–5)
CO2 SERPL-SCNC: 25 MMOL/L (ref 23–29)
CREAT SERPL-MCNC: 0.7 MG/DL (ref 0.5–1.4)
CRP SERPL-MCNC: 1.8 MG/L
CTP QC/QA: YES
EAG (OHS): 108 MG/DL (ref 68–131)
ERYTHROCYTE [DISTWIDTH] IN BLOOD BY AUTOMATED COUNT: 12.4 % (ref 11.5–14.5)
ERYTHROCYTE [SEDIMENTATION RATE] IN BLOOD BY PHOTOMETRIC METHOD: 12 MM/HR
FERRITIN SERPL-MCNC: 107 NG/ML (ref 20–300)
GFR SERPLBLD CREATININE-BSD FMLA CKD-EPI: >60 ML/MIN/1.73/M2
GLUCOSE SERPL-MCNC: 87 MG/DL (ref 70–110)
HBA1C MFR BLD: 5.4 % (ref 4–5.6)
HCT VFR BLD AUTO: 40.4 % (ref 37–48.5)
HDLC SERPL-MCNC: 57 MG/DL (ref 40–75)
HDLC SERPL: 22.1 % (ref 20–50)
HGB BLD-MCNC: 12.9 GM/DL (ref 12–16)
IRON SATN MFR SERPL: 39 % (ref 20–50)
IRON SERPL-MCNC: 146 UG/DL (ref 30–160)
LDLC SERPL CALC-MCNC: 167 MG/DL (ref 63–159)
MCH RBC QN AUTO: 29.5 PG (ref 27–31)
MCHC RBC AUTO-ENTMCNC: 31.9 G/DL (ref 32–36)
MCV RBC AUTO: 92 FL (ref 82–98)
NONHDLC SERPL-MCNC: 201 MG/DL
PLATELET # BLD AUTO: 176 K/UL (ref 150–450)
PMV BLD AUTO: 12.3 FL (ref 9.2–12.9)
POTASSIUM SERPL-SCNC: 4.1 MMOL/L (ref 3.5–5.1)
PROT SERPL-MCNC: 7.3 GM/DL (ref 6–8.4)
RBC # BLD AUTO: 4.37 M/UL (ref 4–5.4)
SODIUM SERPL-SCNC: 139 MMOL/L (ref 136–145)
TIBC SERPL-MCNC: 379 UG/DL (ref 250–450)
TRANSFERRIN SERPL-MCNC: 256 MG/DL (ref 200–375)
TRIGL SERPL-MCNC: 170 MG/DL (ref 30–150)
TSH SERPL-ACNC: 1.48 UIU/ML (ref 0.4–4)
VIT B12 SERPL-MCNC: 506 PG/ML (ref 210–950)
WBC # BLD AUTO: 3.99 K/UL (ref 3.9–12.7)

## 2025-05-07 PROCEDURE — 99396 PREV VISIT EST AGE 40-64: CPT | Mod: S$GLB,,, | Performed by: NURSE PRACTITIONER

## 2025-05-07 PROCEDURE — 83036 HEMOGLOBIN GLYCOSYLATED A1C: CPT | Performed by: NURSE PRACTITIONER

## 2025-05-07 PROCEDURE — 82465 ASSAY BLD/SERUM CHOLESTEROL: CPT | Performed by: NURSE PRACTITIONER

## 2025-05-07 PROCEDURE — 82607 VITAMIN B-12: CPT | Performed by: NURSE PRACTITIONER

## 2025-05-07 PROCEDURE — 84466 ASSAY OF TRANSFERRIN: CPT | Performed by: NURSE PRACTITIONER

## 2025-05-07 PROCEDURE — 3008F BODY MASS INDEX DOCD: CPT | Mod: CPTII,S$GLB,, | Performed by: NURSE PRACTITIONER

## 2025-05-07 PROCEDURE — 86038 ANTINUCLEAR ANTIBODIES: CPT | Performed by: NURSE PRACTITIONER

## 2025-05-07 PROCEDURE — 85652 RBC SED RATE AUTOMATED: CPT | Performed by: NURSE PRACTITIONER

## 2025-05-07 PROCEDURE — 85027 COMPLETE CBC AUTOMATED: CPT | Performed by: NURSE PRACTITIONER

## 2025-05-07 PROCEDURE — 82728 ASSAY OF FERRITIN: CPT | Performed by: NURSE PRACTITIONER

## 2025-05-07 PROCEDURE — 1159F MED LIST DOCD IN RCRD: CPT | Mod: CPTII,S$GLB,, | Performed by: NURSE PRACTITIONER

## 2025-05-07 PROCEDURE — 3074F SYST BP LT 130 MM HG: CPT | Mod: CPTII,S$GLB,, | Performed by: NURSE PRACTITIONER

## 2025-05-07 PROCEDURE — 82247 BILIRUBIN TOTAL: CPT | Performed by: NURSE PRACTITIONER

## 2025-05-07 PROCEDURE — 3044F HG A1C LEVEL LT 7.0%: CPT | Mod: CPTII,S$GLB,, | Performed by: NURSE PRACTITIONER

## 2025-05-07 PROCEDURE — 3079F DIAST BP 80-89 MM HG: CPT | Mod: CPTII,S$GLB,, | Performed by: NURSE PRACTITIONER

## 2025-05-07 PROCEDURE — 84443 ASSAY THYROID STIM HORMONE: CPT | Performed by: NURSE PRACTITIONER

## 2025-05-07 PROCEDURE — 81025 URINE PREGNANCY TEST: CPT | Mod: S$GLB,,, | Performed by: NURSE PRACTITIONER

## 2025-05-07 PROCEDURE — 86140 C-REACTIVE PROTEIN: CPT | Performed by: NURSE PRACTITIONER

## 2025-05-07 PROCEDURE — 1160F RVW MEDS BY RX/DR IN RCRD: CPT | Mod: CPTII,S$GLB,, | Performed by: NURSE PRACTITIONER

## 2025-05-07 RX ORDER — ALBENDAZOLE 200 MG/1
400 TABLET, FILM COATED ORAL DAILY
Qty: 3 TABLET | Refills: 1 | Status: SHIPPED | OUTPATIENT
Start: 2025-05-07 | End: 2025-05-07

## 2025-05-07 RX ORDER — HYDROXYZINE PAMOATE 50 MG/1
CAPSULE ORAL
COMMUNITY
End: 2025-05-07

## 2025-05-07 RX ORDER — ALBENDAZOLE 200 MG/1
400 TABLET, FILM COATED ORAL DAILY
Qty: 6 TABLET | Refills: 1 | Status: SHIPPED | OUTPATIENT
Start: 2025-05-07 | End: 2025-05-13

## 2025-05-07 NOTE — PROGRESS NOTES
Subjective:       Patient ID: Alba Trivedi is a 39 y.o. female.    Chief Complaint: Annual Exam    HPI new patient to me, here for annual exam. Due for labs.     Menses 4 days late. Concerned she might be pregnant.     Seeing Psychiatry. [place] Vyvanse made her too hyper focused. Trying Strattera. Just started it recently. She feels therapy is going well.     Having excessive fatigue. States has been ongoing for a long time. She has discussed with her PCP. She would like to have labs done to check for source of her fatigue.     Does not sleep if she does not take the Trazodone. States she does not feel rested when she wakes in the morning. States has trouble staying awake during the day.     She has been on Lipitor for the past year. . Last LDL was 178.  Wants to go without if possible. Has been eating healthier. She wants to see what her LDL will do with healthier eating.  Family history of DM.     Has new litter of puppies. In kennel with feces, etc. Dogs diagnosed with hook worms. She now has lesion, itchy to inner arch left foot. Has appearance of hookworm.  SEE PICTURE. Will treat.     See ROS    The following portion of the patients history was reviewed and updated as appropriate: allergies, current medications, past medical and surgical history. Past social history and problem list reviewed. Family PMH and Past social history reviewed. Tobacco, Illicit drug use reviewed.      Review of patient's allergies indicates:   Allergen Reactions    Paxil [paroxetine hcl] Nausea Only       Current Medications[1]    Past Medical History:   Diagnosis Date    Allergy     Anxiety     Depression     History of cervical dysplasia     s/p conization followers with Dr Suazo    S/P LASIK surgery of both eyes        Past Surgical History:   Procedure Laterality Date    lasix Bilateral 05/06/2022    LYMPH NODE DISSECTION      one behind left ear as teenager       Social History[2]    Review of Systems   Constitutional:   "Positive for fatigue. Negative for fever.   HENT:  Negative for congestion, postnasal drip, rhinorrhea and voice change.    Eyes:  Negative for visual disturbance.   Respiratory:  Negative for cough, chest tightness, shortness of breath and wheezing.    Cardiovascular:  Negative for chest pain, palpitations and leg swelling.   Gastrointestinal:  Negative for abdominal pain, blood in stool, constipation, diarrhea, nausea and vomiting.   Genitourinary:  Positive for menstrual problem (late). Negative for difficulty urinating and dysuria.   Musculoskeletal:  Negative for arthralgias, back pain and gait problem.   Skin:  Negative for rash and wound.        Lesion to inner left foot   Neurological:  Negative for dizziness, weakness and headaches.   Hematological:  Negative for adenopathy. Does not bruise/bleed easily.   Psychiatric/Behavioral:  Positive for decreased concentration and sleep disturbance. Negative for dysphoric mood. The patient is not nervous/anxious.        Objective:      /82 (BP Location: Left arm, Patient Position: Sitting)   Pulse 87   Temp 98.4 °F (36.9 °C) (Temporal)   Resp 18   Ht 5' 4" (1.626 m)   Wt 87.1 kg (192 lb 2.1 oz)   LMP 04/05/2025 (Exact Date)   SpO2 99%   BMI 32.98 kg/m²      Physical Exam  Constitutional:       Appearance: Normal appearance. She is obese.   HENT:      Head: Normocephalic.   Eyes:      Pupils: Pupils are equal, round, and reactive to light.   Neck:      Thyroid: No thyromegaly.      Vascular: No carotid bruit.   Cardiovascular:      Rate and Rhythm: Normal rate and regular rhythm.      Pulses: Normal pulses.      Heart sounds: Normal heart sounds. No murmur heard.  Pulmonary:      Effort: Pulmonary effort is normal.      Breath sounds: Normal breath sounds. No wheezing.   Abdominal:      General: Bowel sounds are normal.      Tenderness: There is no abdominal tenderness.   Musculoskeletal:         General: Normal range of motion.      Cervical back: " Normal range of motion.      Right lower leg: No edema.      Left lower leg: No edema.      Comments: Gait normal.  strong, equal   Skin:     General: Skin is warm and dry.      Capillary Refill: Capillary refill takes less than 2 seconds.      Comments: See picture. Has what appears to be hookworm to inner left foot arch   Neurological:      General: No focal deficit present.      Mental Status: She is alert.   Psychiatric:         Attention and Perception: Attention and perception normal.         Mood and Affect: Mood and affect normal.         Speech: Speech normal.         Behavior: Behavior normal.         Assessment:       1. Annual physical exam    2. Major depressive disorder, recurrent episode, moderate    3. Primary insomnia    4. Attention deficit hyperactivity disorder (ADHD), predominantly inattentive type    5. Mixed hyperlipidemia    6. SAHARA (generalized anxiety disorder)    7. Late menses    8. Laboratory exam ordered as part of routine general medical examination    9. Fatigue, unspecified type    10. Hookworm infection    11. Obstructive sleep apnea syndrome    12. Abnormal mammogram    13. Abnormal mammogram of left breast        Plan:       Annual physical exam    Major depressive disorder, recurrent episode, moderate: continue to follow with Psychiatry.     Primary insomnia: continue trazodone    Attention deficit hyperactivity disorder (ADHD), predominantly inattentive type: continue to follow with Psychiatry    Mixed hyperlipidemia: tolerating statin. Due to repeat labs.  -     Lipid Panel    SAHARA (generalized anxiety disorder): stable on Effexor    Late menses: urine pregnancy negative  -     POCT Urine Pregnancy    Laboratory exam ordered as part of routine general medical examination  -     Comprehensive Metabolic Panel  -     Hemoglobin A1C  -     TSH  -     CBC Without Differential  -     JAYSON Screen w/Reflex  -     Sedimentation rate  -     C-Reactive Protein    Fatigue, unspecified  type: check labs.  -     Iron and TIBC  -     Ferritin  -     Vitamin B12    Hookworm infection: treat. Discussed treating animals. 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   -       albendazole (ALBENZA) 200 mg Tab; Take 2 tablets (400 mg total) by mouth once daily. for 6 days  Dispense: 6 tablet; Refill: 1    Obstructive sleep apnea syndrome: schedule sleep study  -     Home Sleep Study; Future    Abnormal mammogram: due for repeat mammogram    Abnormal mammogram of left breast  -        Mammo Digital Diagnostic Left with [facility] (XPD); Future; Expected date: 05/07/2025     Continue current medication  Take medications only as prescribed  Healthy diet, exercise  Adequate rest  Adequate hydration  Avoid allergens  Avoid excessive caffeine     Follow up with PCP after testing completed.          [1]   Current Outpatient Medications:     atomoxetine (STRATTERA) 40 MG capsule, Take 1 capsule (40 mg total) by mouth once daily for 7 days, THEN 2 capsules (80 mg total) once daily for 23 days., Disp: 53 capsule, Rfl: 0    atorvastatin (LIPITOR) 10 MG tablet, Take 1 tablet by mouth once daily, Disp: 90 tablet, Rfl: 3    cetirizine (ZYRTEC) 10 MG tablet, Take 10 mg by mouth once daily., Disp: , Rfl:     lisdexamfetamine (VYVANSE) 40 MG Cap, Take 1 capsule (40 mg total) by mouth every morning., Disp: 30 capsule, Rfl: 0    norethindrone (MICRONOR) 0.35 mg tablet, Take 1 tablet (0.35 mg total) by mouth once daily., Disp: 30 tablet, Rfl: 11    traZODone (DESYREL) 50 MG tablet, Take 1 tablet by mouth in the evening, Disp: 90 tablet, Rfl: 0    venlafaxine (EFFEXOR-XR) 150 MG Cp24, Take 1 capsule (150 mg total) by mouth once daily., Disp: 90 capsule, Rfl: 1  [2]   Social History  Socioeconomic History    Marital status:    Tobacco Use    Smoking status: Never    Smokeless tobacco: Never   Substance and Sexual Activity    Alcohol use: Yes     Comment: once in a while    Drug use: No    Sexual activity: Yes      Partners: Male   Social History Narrative    ** Merged History Encounter **         Lives with  and 2 children.  Works in [employer] on [employer].       Social Drivers of Health     Financial Resource Strain: Low Risk  (6/13/2024)    Overall Financial Resource Strain (CARDIA)     Difficulty of Paying Living Expenses: Not hard at all   Food Insecurity: No Food Insecurity (6/13/2024)    Hunger Vital Sign     Worried About Running Out of Food in the Last Year: Never true     Ran Out of Food in the Last Year: Never true   Physical Activity: Insufficiently Active (6/13/2024)    Exercise Vital Sign     Days of Exercise per Week: 3 days     Minutes of Exercise per Session: 20 min   Stress: Stress Concern Present (6/13/2024)    [employer] - Occupational Stress Questionnaire     Feeling of Stress : To some extent   Housing Stability: Unknown (6/13/2024)    Housing Stability Vital Sign     Unable to Pay for Housing in the Last Year: No

## 2025-05-08 ENCOUNTER — PATIENT MESSAGE (OUTPATIENT)
Dept: FAMILY MEDICINE | Facility: CLINIC | Age: 40
End: 2025-05-08
Payer: COMMERCIAL

## 2025-05-08 LAB — ANA (OHS): NORMAL

## 2025-05-19 ENCOUNTER — HOSPITAL ENCOUNTER (OUTPATIENT)
Dept: RADIOLOGY | Facility: HOSPITAL | Age: 40
Discharge: HOME | End: 2025-05-19
Attending: NURSE PRACTITIONER
Payer: COMMERCIAL

## 2025-05-19 DIAGNOSIS — R92.8 ABNORMAL MAMMOGRAM OF LEFT BREAST: ICD-10-CM

## 2025-05-19 PROCEDURE — 77061 BREAST TOMOSYNTHESIS UNI: CPT | Mod: TC,PO,LT

## 2025-05-19 PROCEDURE — 77065 DX MAMMO INCL CAD UNI: CPT | Mod: 26,LT,, | Performed by: RADIOLOGY

## 2025-05-19 PROCEDURE — 76642 ULTRASOUND BREAST LIMITED: CPT | Mod: TC,PO,LT

## 2025-05-19 PROCEDURE — 77061 BREAST TOMOSYNTHESIS UNI: CPT | Mod: 26,LT,, | Performed by: RADIOLOGY

## 2025-05-19 PROCEDURE — 76642 ULTRASOUND BREAST LIMITED: CPT | Mod: 26,LT,, | Performed by: RADIOLOGY

## 2025-05-21 RX ORDER — ATOMOXETINE 80 MG/1
80 CAPSULE ORAL DAILY
Qty: 90 CAPSULE | Refills: 0 | Status: SHIPPED | OUTPATIENT
Start: 2025-05-21

## 2025-06-05 ENCOUNTER — OFFICE VISIT (OUTPATIENT)
Dept: PSYCHIATRY | Facility: CLINIC | Age: 40
End: 2025-06-05
Payer: COMMERCIAL

## 2025-06-05 DIAGNOSIS — F40.10 SOCIAL ANXIETY DISORDER: ICD-10-CM

## 2025-06-05 DIAGNOSIS — F33.40 RECURRENT MAJOR DEPRESSIVE DISORDER, IN REMISSION: Primary | ICD-10-CM

## 2025-06-05 DIAGNOSIS — F41.1 GAD (GENERALIZED ANXIETY DISORDER): ICD-10-CM

## 2025-06-05 DIAGNOSIS — F90.2 ATTENTION DEFICIT HYPERACTIVITY DISORDER (ADHD), COMBINED TYPE: ICD-10-CM

## 2025-06-05 PROCEDURE — 1159F MED LIST DOCD IN RCRD: CPT | Mod: CPTII,95,,

## 2025-06-05 PROCEDURE — 1160F RVW MEDS BY RX/DR IN RCRD: CPT | Mod: CPTII,95,,

## 2025-06-05 PROCEDURE — 3044F HG A1C LEVEL LT 7.0%: CPT | Mod: CPTII,95,,

## 2025-06-05 PROCEDURE — 98007 SYNCH AUDIO-VIDEO EST HI 40: CPT | Mod: 95,,,

## 2025-07-01 ENCOUNTER — PATIENT MESSAGE (OUTPATIENT)
Dept: FAMILY MEDICINE | Facility: CLINIC | Age: 40
End: 2025-07-01
Payer: COMMERCIAL

## 2025-07-28 ENCOUNTER — ON-DEMAND VIRTUAL (OUTPATIENT)
Dept: URGENT CARE | Facility: CLINIC | Age: 40
End: 2025-07-28
Payer: COMMERCIAL

## 2025-07-28 DIAGNOSIS — L03.811 CELLULITIS OF HEAD EXCEPT FACE: Primary | ICD-10-CM

## 2025-07-28 PROCEDURE — 98005 SYNCH AUDIO-VIDEO EST LOW 20: CPT | Mod: 95,,, | Performed by: NURSE PRACTITIONER

## 2025-07-28 RX ORDER — DOXYCYCLINE 100 MG/1
100 CAPSULE ORAL 2 TIMES DAILY
Qty: 20 CAPSULE | Refills: 0 | Status: SHIPPED | OUTPATIENT
Start: 2025-07-28 | End: 2025-08-07

## 2025-07-28 RX ORDER — MUPIROCIN 20 MG/G
OINTMENT TOPICAL 3 TIMES DAILY
Qty: 22 G | Refills: 0 | Status: SHIPPED | OUTPATIENT
Start: 2025-07-28

## 2025-07-28 NOTE — PROGRESS NOTES
Subjective:      Patient ID: Alba Trivedi is a 39 y.o. female.    Vitals:  vitals were not taken for this visit.     Chief Complaint: Cellulitis      Visit Type: TELE AUDIOVISUAL - This visit was conducted virtually based on  subjective information and limited objective exam    Present with the patient at the time of consultation: TELEMED PRESENT WITH PATIENT: None  LOCATION OF PATIENT [place]  Two patient identifiers used to verify patient- saying out date of birth and full name.       Past Medical History:   Diagnosis Date    Allergy     Anxiety     Depression     History of cervical dysplasia     s/p conization followers with Dr Suazo    S/P LASIK surgery of both eyes      Past Surgical History:   Procedure Laterality Date    lasix Bilateral 05/06/2022    LYMPH NODE DISSECTION      one behind left ear as teenager     Review of patient's allergies indicates:   Allergen Reactions    Paxil [paroxetine hcl] Nausea Only     Medications Ordered Prior to Encounter[1]  Family History   Problem Relation Name Age of Onset    Breast cancer Maternal Grandmother  60    Arthritis Maternal Grandmother      Cancer Maternal Grandmother          breast    Diabetes Maternal Grandmother      Early death Maternal Grandmother      Heart disease Maternal Grandmother      Retinal detachment Maternal Grandmother      Cancer Maternal Grandfather          colon cancer    Diabetes Maternal Grandfather      Early death Maternal Grandfather      Heart disease Maternal Grandfather      Alcohol abuse Father      Cancer Father          BCC    Drug abuse Father      Heart disease Father  55        MI with 3 stents    Asthma Brother      ADD / ADHD Brother      ADD / ADHD Son      Macular degeneration Maternal Uncle      Ovarian cancer Neg Hx      Uterine cancer Neg Hx         Medications Ordered                [id number] - [place], [place] - [address] [facility] [address]    Telephone: [phone]    Fax: [phone]   Hours: Not open 24 hours                         E-Prescribed (2 of 2)              doxycycline (VIBRAMYCIN) 100 MG Cap    Sig: Take 1 capsule (100 mg total) by mouth 2 (two) times daily. for 10 days       Start: 7/28/25     Quantity: 20 capsule Refills: 0                         mupirocin (BACTROBAN) 2 % ointment    Sig: Apply topically 3 (three) times daily.       Start: 7/28/25     Quantity: 22 g Refills: 0                           Ohs Peq Odvv Intake    7/28/2025 11:55 AM CDT - Filed by Patient   What is your current physical address in the event of a medical emergency? [address] [place] [address]   Are you able to take your vital signs? No   Please attach any relevant images or files    Is your employer contracted with [facility]? No         HPI  ROS     Objective:   The physical exam was conducted virtually.    AAO x 3 ; no acute distress noted; appearance normal; mood and behavior normal; thought process normal  Head- normocephalic  Nose- appears normal, no discharge or erythema  Eyes- pupils appear normal in size, no drainage, no erythema  Ears- bilateral ears with redness and edema   Oropharynx- no erythema, lesions  Lungs- breathing at a normal rate, no acute distress noted  Heart- no reports of tachycardia, palpitations, chest pain  Abdomen- non distended, non tender reported by patient  Skin- warm and dry, no erythema or edema noted by patient or visualized  Psych- as above; no si/hi      Assessment:     1. Cellulitis of head except face        Plan:     Will send in doxycyline 100 mg po bid x 10 days  Bactroban to area  Monitor closely  Concern for cauliflower ear development       Thank you for choosing [facility]!    Our goal in the [facility] is to always provide outstanding medical care. You may receive a survey by mail or e-mail in the next week regarding your experience today. We would greatly appreciate you  completing and returning the survey. Your feedback provides us with a way to recognize our staff who provide very good care, and it helps us learn how to improve when your experience was below our aspiration of excellence.         We appreciate you trusting us with your medical care. We hope you feel better soon. We will be happy to take care of you for all of your future medical needs.    You must understand that you've received an Urgent Care treatment only and that you may be released before all your medical problems are known or treated. You, the patient, will arrange for follow up care as instructed.    Follow up with your PCP or specialty clinic as directed in the next 1-2 weeks if not improved or as needed.  You can call [phone] to schedule an appointment with the appropriate provider.    If your condition worsens we recommend that you receive another evaluation in person, with your primary care provider, urgent care or at the emergency room immediately or contact your primary medical clinics after hours call service to discuss your concerns.         Cellulitis of head except face  -     doxycycline (VIBRAMYCIN) 100 MG Cap; Take 1 capsule (100 mg total) by mouth 2 (two) times daily. for 10 days  Dispense: 20 capsule; Refill: 0  -     mupirocin (BACTROBAN) 2 % ointment; Apply topically 3 (three) times daily.  Dispense: 22 g; Refill: 0                         [1]   Current Outpatient Medications on File Prior to Visit   Medication Sig Dispense Refill    atomoxetine (STRATTERA) 80 MG capsule Take 1 capsule (80 mg total) by mouth once daily. 90 capsule 0    atorvastatin (LIPITOR) 10 MG tablet Take 1 tablet by mouth once daily 90 tablet 3    cetirizine (ZYRTEC) 10 MG tablet Take 10 mg by mouth once daily.      norethindrone (MICRONOR) 0.35 mg tablet Take 1 tablet (0.35 mg total) by mouth once daily. 30 tablet 11    traZODone (DESYREL) 50 MG tablet Take 1 tablet by mouth in the evening 90 tablet 0     venlafaxine (EFFEXOR-XR) 150 MG Cp24 Take 1 capsule (150 mg total) by mouth once daily. 90 capsule 1     No current facility-administered medications on file prior to visit.

## 2025-08-18 RX ORDER — VENLAFAXINE HYDROCHLORIDE 150 MG/1
150 CAPSULE, EXTENDED RELEASE ORAL
Qty: 90 CAPSULE | Refills: 0 | Status: SHIPPED | OUTPATIENT
Start: 2025-08-18